# Patient Record
Sex: FEMALE | Race: WHITE | NOT HISPANIC OR LATINO | Employment: OTHER | URBAN - METROPOLITAN AREA
[De-identification: names, ages, dates, MRNs, and addresses within clinical notes are randomized per-mention and may not be internally consistent; named-entity substitution may affect disease eponyms.]

---

## 2017-07-05 ENCOUNTER — OFFICE VISIT (OUTPATIENT)
Dept: INTERNAL MEDICINE | Facility: CLINIC | Age: 82
End: 2017-07-05

## 2017-07-05 VITALS
WEIGHT: 139 LBS | HEIGHT: 63 IN | DIASTOLIC BLOOD PRESSURE: 90 MMHG | SYSTOLIC BLOOD PRESSURE: 134 MMHG | BODY MASS INDEX: 24.63 KG/M2
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
## 2017-07-05 DIAGNOSIS — E78.00 HYPERCHOLESTEROLEMIA: ICD-10-CM

## 2017-07-05 DIAGNOSIS — I10 ESSENTIAL HYPERTENSION: ICD-10-CM

## 2017-07-05 DIAGNOSIS — Z13.820 SCREENING FOR OSTEOPOROSIS: ICD-10-CM

## 2017-07-05 DIAGNOSIS — Z00.00 MEDICARE ANNUAL WELLNESS VISIT, SUBSEQUENT: Primary | ICD-10-CM

## 2017-07-05 LAB
ALBUMIN SERPL-MCNC: 4.3 G/DL (ref 3.5–5.2)
ALBUMIN/GLOB SERPL: 1.6 G/DL
ALP SERPL-CCNC: 67 U/L (ref 39–117)
ALT SERPL-CCNC: 16 U/L (ref 1–33)
AST SERPL-CCNC: 21 U/L (ref 1–32)
BILIRUB SERPL-MCNC: 0.8 MG/DL (ref 0.1–1.2)
BUN SERPL-MCNC: 25 MG/DL (ref 8–23)
BUN/CREAT SERPL: 24.8 (ref 7–25)
CALCIUM SERPL-MCNC: 9.9 MG/DL (ref 8.6–10.5)
CHLORIDE SERPL-SCNC: 102 MMOL/L (ref 98–107)
CHOLEST SERPL-MCNC: 181 MG/DL (ref 0–200)
CO2 SERPL-SCNC: 25.3 MMOL/L (ref 22–29)
CREAT SERPL-MCNC: 1.01 MG/DL (ref 0.57–1)
GLOBULIN SER CALC-MCNC: 2.7 GM/DL
GLUCOSE SERPL-MCNC: 99 MG/DL (ref 65–99)
HDLC SERPL-MCNC: 69 MG/DL (ref 40–60)
LDLC SERPL CALC-MCNC: 93 MG/DL (ref 0–100)
POTASSIUM SERPL-SCNC: 4.5 MMOL/L (ref 3.5–5.2)
PROT SERPL-MCNC: 7 G/DL (ref 6–8.5)
SODIUM SERPL-SCNC: 141 MMOL/L (ref 136–145)
TRIGL SERPL-MCNC: 95 MG/DL (ref 0–150)
VLDLC SERPL-MCNC: 19 MG/DL (ref 5–40)

## 2017-07-05 PROCEDURE — G0439 PPPS, SUBSEQ VISIT: HCPCS | Performed by: INTERNAL MEDICINE

## 2017-07-05 PROCEDURE — 36415 COLL VENOUS BLD VENIPUNCTURE: CPT | Performed by: INTERNAL MEDICINE

## 2017-07-05 PROCEDURE — 99213 OFFICE O/P EST LOW 20 MIN: CPT | Performed by: INTERNAL MEDICINE

## 2017-07-05 RX ORDER — HYDROCHLOROTHIAZIDE 25 MG/1
25 TABLET ORAL DAILY
Qty: 90 TABLET | Refills: 1 | Status: SHIPPED | OUTPATIENT
Start: 2017-07-05 | End: 2019-02-08 | Stop reason: SDUPTHER

## 2017-07-05 RX ORDER — DOXYCYCLINE HYCLATE 100 MG/1
100 CAPSULE ORAL 2 TIMES DAILY
Qty: 14 CAPSULE | Refills: 0 | Status: SHIPPED | OUTPATIENT
Start: 2017-07-05 | End: 2017-07-12

## 2017-07-05 NOTE — PROGRESS NOTES
Chief Complaint   Patient presents with   • Annual Exam     wellness exam   • Insect Bite     left forearm red swollen in the area, itches and stings   • Hypertension   • Hyperlipidemia        Subjective   Eliane Rao is a 81 y.o. female     HPI: Hypertension: This is a chronic problem.   No management changes were made at her last appointment.       She has not had problems with headaches, visual disturbance, dizziness.  Recent blood pressures have been controlled.    She has not had associated symptoms of chest pain, syncope, edema, orthopnea, SALAS, PND.     Current treatment: Diuretic, beta blocker.  Prudent diet and regular exercise have also been recommended. By report, there is good compliance and good tolerance of medication.     Hyperlipidemia:  This is a chronic problem.   No management changes were made at her last appointment.   Her most recent lipid panel was done on 12/8/2016.  Total cholesterol was 236, Triglycerides 104, HDL 55, .   Current treatment: Statin.   Prudent diet and regular exercise have also been recommended. By report, there is good compliance with treatment and good tolerance.    She has not experienced statin associated myalgias, dyspepsia.  She has not had liver enzyme elevation.     She was bitten by an insect on Sunday or Monday.  She is experiencing a stinging itch in the area.  Cortisone cream helps temporarily.              The following portions of the patient's history were reviewed and updated as appropriate: allergies, current medications, past social history, problem list, past surgical history    Review of Systems   Constitutional: Negative for activity change and appetite change.   HENT: Negative for nosebleeds.    Eyes: Negative for visual disturbance.   Respiratory: Negative for cough and shortness of breath.    Cardiovascular: Negative for chest pain, palpitations and leg swelling.   Neurological: Negative for headaches.   Psychiatric/Behavioral: Negative  "for sleep disturbance.           Objective     /90  Ht 63\" (160 cm)  Wt 139 lb (63 kg)  BMI 24.62 kg/m2     Physical Exam   Constitutional: She is oriented to person, place, and time. She appears well-developed and well-nourished. No distress.   Neck: Normal carotid pulses present. Carotid bruit is not present.   Cardiovascular: Normal rate, regular rhythm, S1 normal, S2 normal and intact distal pulses.  Exam reveals no gallop and no friction rub.    No murmur heard.  Pulses:       Carotid pulses are 2+ on the right side, and 2+ on the left side.  Pulmonary/Chest: Effort normal and breath sounds normal. No respiratory distress. She has no wheezes. She has no rhonchi. She has no rales. Chest wall is not dull to percussion.   Musculoskeletal: She exhibits no edema.   Neurological: She is alert and oriented to person, place, and time.   Skin: Skin is warm and dry.   Left forearm: Area of erythema present.   Nursing note and vitals reviewed.      Assessment/Plan   Problem List Items Addressed This Visit        Cardiovascular and Mediastinum    Hypercholesterolemia    Relevant Orders    Lipid Panel    Hypertension    Relevant Medications    hydrochlorothiazide (HYDRODIURIL) 25 MG tablet    Other Relevant Orders    Comprehensive Metabolic Panel    Lipid Panel    Comprehensive Metabolic Panel      Other Visit Diagnoses     Medicare annual wellness visit, subsequent        Screening for osteoporosis        Relevant Orders    DEXA Bone Density Axial    Insect bite, initial encounter        Relevant Medications    doxycycline (VIBRAMYCIN) 100 MG capsule            "

## 2017-07-05 NOTE — PROGRESS NOTES
QUICK REFERENCE INFORMATION:  The ABCs of the Annual Wellness Visit    Subsequent Medicare Wellness Visit    HEALTH RISK ASSESSMENT    1935    Recent Hospitalizations:  No hospitalization(s) within the last year..        Current Medical Providers:  Patient Care Team:  Ara Mcgrath MD as PCP - General  Ara Mcgrath MD as PCP - Family Medicine  Ara Mcgrath MD as PCP - Claims Attributed        Smoking Status:  History   Smoking Status   • Former Smoker   • Quit date: 1990   Smokeless Tobacco   • Not on file       Alcohol Consumption:  History   Alcohol Use   • Yes     Comment: Occasional       Depression Screen:   PHQ-9 Depression Screening 7/5/2017   Little interest or pleasure in doing things 0   Feeling down, depressed, or hopeless 1   Trouble falling or staying asleep, or sleeping too much 0   Feeling tired or having little energy 0   Poor appetite or overeating 0   Feeling bad about yourself - or that you are a failure or have let yourself or your family down 0   Trouble concentrating on things, such as reading the newspaper or watching television 0   Moving or speaking so slowly that other people could have noticed. Or the opposite - being so fidgety or restless that you have been moving around a lot more than usual 0   Thoughts that you would be better off dead, or of hurting yourself in some way 0   PHQ-9 Total Score 1       Health Habits and Functional and Cognitive Screening:  Functional & Cognitive Status 7/5/2017   Do you have difficulty preparing food and eating? No   Do you have difficulty bathing yourself? No   Do you have difficulty getting dressed? No   Do you have difficulty using the toilet? No   Do you have difficulty moving around from place to place? No   In the past year have you fallen or experienced a near fall? No   Do you need help using the phone?  No   Are you deaf or do you have serious difficulty hearing?  No   Do you need help with transportation? No   Do you need help shopping?  No   Do you need help preparing meals?  No   Do you need help with housework?  No   Do you need help with laundry? No   Do you need help taking your medications? No   Do you need help managing money? No   Do you have difficulty concentrating, remembering or making decisions? No       Health Habits  Current Diet: Well Balanced Diet  Dental Exam: Not up to date  Eye Exam: Up to date  Exercise (times per week): 1 times per week  Current Exercise Activities Include: Walking      Does the patient have evidence of cognitive impairment? No    Aspirin use counseling: Taking ASA appropriately as indicated      Recent Lab Results:  CMP:  Lab Results   Component Value Date    BUN 21 12/08/2016    CREATININE 1.11 (H) 12/08/2016    EGFRIFNONA 47 (L) 12/08/2016    BCR 18.9 12/08/2016     12/08/2016    K 4.5 12/08/2016    CO2 32.0 12/08/2016    CALCIUM 8.7 12/08/2016    ALBUMIN 3.40 12/08/2016    LABIL2 1.0 12/08/2016    BILITOT 0.6 12/08/2016    ALKPHOS 84 12/08/2016    AST 18 12/08/2016    ALT 31 12/08/2016     Lipid Panel:  Lab Results   Component Value Date    CHOL 236 (H) 12/08/2016    TRIG 104 12/08/2016    HDL 55 12/08/2016    VLDL 20.8 12/08/2016    LDLCALC 160 (H) 12/08/2016    LDLHDL 2.91 12/08/2016     HbA1c:       Visual Acuity:  No exam data present    Age-appropriate Screening Schedule:  Refer to the list below for future screening recommendations based on patient's age, sex and/or medical conditions. Orders for these recommended tests are listed in the plan section. The patient has been provided with a written plan.    Health Maintenance   Topic Date Due   • TDAP/TD VACCINES (1 - Tdap) 08[birth year]10/1954   • MAMMOGRAM  07/06/2016   • DXA SCAN  11/30/2016   • INFLUENZA VACCINE  08/01/2017   • LIPID PANEL  12/08/2017   • PNEUMOCOCCAL VACCINES (65+ LOW/MEDIUM RISK)  Completed   • ZOSTER VACCINE  Completed        Subjective   History of Present Illness    Eliane Rao is a 81 y.o. female who presents for an  "Subsequent Wellness Visit.    The following portions of the patient's history were reviewed and updated as appropriate: allergies, current medications, past family history, past medical history, past social history, past surgical history and problem list.    Outpatient Medications Prior to Visit   Medication Sig Dispense Refill   • aspirin 81 MG tablet Take 81 mg by mouth daily.     • budesonide-formoterol (SYMBICORT) 160-4.5 MCG/ACT inhaler Inhale 2 puffs 2 (two) times a day as needed.     • Calcium Carb-Cholecalciferol (CALTRATE 600+D) 600-800 MG-UNIT tablet Take  by mouth.     • hydrochlorothiazide (HYDRODIURIL) 25 MG tablet Take 25 mg by mouth daily.     • lovastatin (MEVACOR) 40 MG tablet take 1 tablet by mouth once daily 90 tablet 4   • metoprolol tartrate (LOPRESSOR) 100 MG tablet take 1 tablet by mouth twice a day 180 tablet 3   • montelukast (SINGULAIR) 10 MG tablet Take 10 mg by mouth every night.     • Probiotic Product (PROBIOTIC DAILY PO) Take 1 capsule by mouth daily.       No facility-administered medications prior to visit.        Patient Active Problem List   Diagnosis   • Hypercholesterolemia   • Hypertension   • Chronic renal insufficiency   • IBS (irritable bowel syndrome)   • Patent foramen ovale       Advance Care Planning:  has an advance directive - a copy HAS NOT been provided    Identification of Risk Factors:  Risk factors include: weight  and She is overweight.  Otherwise no risk factors..    Review of Systems    Compared to one year ago, the patient feels her physical health is the same.  Compared to one year ago, the patient feels her mental health is the same.    Objective     Physical Exam    Vitals:    07/05/17 1015   BP: 134/90   Weight: 139 lb (63 kg)   Height: 63\" (160 cm)       Body mass index is 24.62 kg/(m^2).  Discussed the patient's BMI with her. The BMI is in the acceptable range.    Assessment/Plan   Patient Self-Management and Personalized Health Advice  The patient has " been provided with information about: diet, exercise and weight management and preventive services including:   · Bone densitometry screening, Exercise counseling provided, Fall Risk assessment done, Glaucoma screening recommended, She sees her gynecologist for Pap smears and mammograms.  She will check to make sure she is up-to-date on those.  She will be scheduled for bone density test.  She had colonoscopy in 2013.  She will be due for her next one in 2018.  She is up-to-date on immunizations..    Visit Diagnoses:  No diagnosis found.    No orders of the defined types were placed in this encounter.      Outpatient Encounter Prescriptions as of 7/5/2017   Medication Sig Dispense Refill   • aspirin 81 MG tablet Take 81 mg by mouth daily.     • budesonide-formoterol (SYMBICORT) 160-4.5 MCG/ACT inhaler Inhale 2 puffs 2 (two) times a day as needed.     • Calcium Carb-Cholecalciferol (CALTRATE 600+D) 600-800 MG-UNIT tablet Take  by mouth.     • hydrochlorothiazide (HYDRODIURIL) 25 MG tablet Take 25 mg by mouth daily.     • lovastatin (MEVACOR) 40 MG tablet take 1 tablet by mouth once daily 90 tablet 4   • metoprolol tartrate (LOPRESSOR) 100 MG tablet take 1 tablet by mouth twice a day 180 tablet 3   • montelukast (SINGULAIR) 10 MG tablet Take 10 mg by mouth every night.     • Probiotic Product (PROBIOTIC DAILY PO) Take 1 capsule by mouth daily.       No facility-administered encounter medications on file as of 7/5/2017.        Reviewed use of high risk medication in the elderly: not applicable  Reviewed for potential of harmful drug interactions in the elderly: not applicable    Follow Up:  No Follow-up on file.     An After Visit Summary and PPPS with all of these plans were given to the patient.

## 2017-07-05 NOTE — PATIENT INSTRUCTIONS
Medicare Wellness  Personal Prevention Plan of Service     Date of Office Visit:  2017  Encounter Provider:  Ara Mcgrath MD  Place of Service:  [facility]  Patient Name: Eliane Rao  :  1935    As part of the Medicare Wellness portion of your visit today, we are providing you with this personalized preventive plan of services (PPPS). This plan is based upon recommendations of the [place] Preventive Services Task Force (USPSTF) and the Advisory Committee on Immunization Practices (ACIP).    This lists the preventive care services that should be considered, and provides dates of when you are due. Items listed as completed are up-to-date and do not require any further intervention.    Health Maintenance   Topic Date Due   • TDAP/TD VACCINES (1 - Tdap) 08/10/1955   • MEDICARE ANNUAL WELLNESS  2016   • MAMMOGRAM  2016   • DXA SCAN  2016   • INFLUENZA VACCINE  2017   • LIPID PANEL  2017   • PNEUMOCOCCAL VACCINES (65+ LOW/MEDIUM RISK)  Completed   • ZOSTER VACCINE  Completed       Orders Placed This Encounter   Procedures   • DEXA Bone Density Axial     Standing Status:   Future     Order Specific Question:   Reason for Exam:     Answer:   screening for osteoporosis   • Comprehensive Metabolic Panel     Standing Status:   Future     Number of Occurrences:   1   • Lipid Panel     Standing Status:   Future     Number of Occurrences:   1     Standing Expiration Date:   2018   • Comprehensive Metabolic Panel     Standing Status:   Future     Number of Occurrences:   1     Standing Expiration Date:   2018   • Lipid Panel     Standing Status:   Future     Number of Occurrences:   1     Standing Expiration Date:   2018       Return in about 6 months (around 2018) for Recheck, Fasting.

## 2017-08-16 ENCOUNTER — CLINICAL SUPPORT (OUTPATIENT)
Dept: INTERNAL MEDICINE | Facility: CLINIC | Age: 82
End: 2017-08-16

## 2017-08-16 DIAGNOSIS — Z13.820 SCREENING FOR OSTEOPOROSIS: ICD-10-CM

## 2017-08-16 PROCEDURE — 77080 DXA BONE DENSITY AXIAL: CPT | Performed by: INTERNAL MEDICINE

## 2017-08-21 ENCOUNTER — OFFICE VISIT (OUTPATIENT)
Dept: INTERNAL MEDICINE | Facility: CLINIC | Age: 82
End: 2017-08-21

## 2017-08-21 VITALS
BODY MASS INDEX: 24.45 KG/M2 | DIASTOLIC BLOOD PRESSURE: 82 MMHG | SYSTOLIC BLOOD PRESSURE: 134 MMHG | HEIGHT: 63 IN | WEIGHT: 138 LBS | TEMPERATURE: 98.2 F

## 2017-08-21 DIAGNOSIS — R82.90 ABNORMAL URINE FINDINGS: ICD-10-CM

## 2017-08-21 DIAGNOSIS — R10.30 LOWER ABDOMINAL PAIN: Primary | ICD-10-CM

## 2017-08-21 LAB
ALBUMIN SERPL-MCNC: 3.49 G/DL (ref 3.4–4.6)
ALBUMIN/GLOB SERPL: 0.8 G/DL
ALP SERPL-CCNC: 65 U/L (ref 46–116)
ALT SERPL W P-5'-P-CCNC: 19 U/L (ref 14–59)
ANION GAP SERPL CALCULATED.3IONS-SCNC: 13 MMOL/L
AST SERPL-CCNC: 20 U/L (ref 7–37)
BACTERIA UR QL AUTO: ABNORMAL /HPF
BASOPHILS # BLD AUTO: 0.02 10*3/MM3 (ref 0–0.2)
BASOPHILS NFR BLD AUTO: 0.2 % (ref 0–1.5)
BILIRUB SERPL-MCNC: 2 MG/DL (ref 0.2–1)
BILIRUB UR QL STRIP: NEGATIVE
BUN BLD-MCNC: 23 MG/DL (ref 6–22)
BUN/CREAT SERPL: 20 (ref 7–25)
CALCIUM SPEC-SCNC: 9.6 MG/DL (ref 8.6–10.5)
CHLORIDE SERPL-SCNC: 101 MMOL/L (ref 95–107)
CLARITY UR: CLEAR
CO2 SERPL-SCNC: 26 MMOL/L (ref 23–32)
COLOR UR: YELLOW
CREAT BLD-MCNC: 1.15 MG/DL (ref 0.55–1.02)
EOSINOPHIL # BLD AUTO: 0.26 10*3/MM3 (ref 0–0.7)
EOSINOPHIL # BLD AUTO: 2.7 % (ref 0.3–6.2)
ERYTHROCYTE [DISTWIDTH] IN BLOOD BY AUTOMATED COUNT: 13.5 % (ref 11.7–13)
GFR SERPL CREATININE-BSD FRML MDRD: 45 ML/MIN/1.73
GLOBULIN UR ELPH-MCNC: 4.1 GM/DL
GLUCOSE BLD-MCNC: 106 MG/DL (ref 70–100)
GLUCOSE UR STRIP-MCNC: NEGATIVE MG/DL
HCT VFR BLD AUTO: 42 % (ref 35.6–45.5)
HGB BLD-MCNC: 13.3 G/DL (ref 11.9–15.5)
HGB UR QL STRIP.AUTO: NEGATIVE
HYALINE CASTS UR QL AUTO: ABNORMAL /LPF
IMM GRANULOCYTES # BLD: 0.04 10*3/MM3 (ref 0–0.03)
IMM GRANULOCYTES NFR BLD: 0.4 % (ref 0–0.5)
KETONES UR QL STRIP: ABNORMAL
LEUKOCYTE ESTERASE UR QL STRIP.AUTO: ABNORMAL
LYMPHOCYTES # BLD AUTO: 1.62 10*3/MM3 (ref 0.9–4.8)
LYMPHOCYTES NFR BLD AUTO: 16.8 % (ref 19.6–45.3)
MCH RBC QN AUTO: 31.4 PG (ref 26.9–32)
MCHC RBC AUTO-ENTMCNC: 31.7 G/DL (ref 32.4–36.3)
MCV RBC AUTO: 99.3 FL (ref 80.5–98.2)
MONOCYTES # BLD AUTO: 0.89 10*3/MM3 (ref 0.2–1.2)
MONOCYTES NFR BLD AUTO: 9.2 % (ref 5–12)
NEUTROPHILS # BLD AUTO: 6.81 10*3/MM3 (ref 1.9–8.1)
NEUTROPHILS NFR BLD AUTO: 70.7 % (ref 42.7–76)
NITRITE UR QL STRIP: NEGATIVE
PH UR STRIP.AUTO: 6 [PH] (ref 5–8)
PLATELET # BLD AUTO: 236 10*3/MM3 (ref 140–500)
POTASSIUM BLD-SCNC: 3.9 MMOL/L (ref 3.3–5.3)
PROT SERPL-MCNC: 7.6 G/DL (ref 6.3–8.4)
PROT UR QL STRIP: ABNORMAL
RBC # BLD AUTO: 4.23 10*6/MM3 (ref 3.9–5.2)
RBC # UR: ABNORMAL /HPF
REF LAB TEST METHOD: ABNORMAL
SODIUM BLD-SCNC: 140 MMOL/L (ref 136–145)
SP GR UR STRIP: 1.01 (ref 1–1.03)
SQUAMOUS #/AREA URNS HPF: ABNORMAL /HPF
UROBILINOGEN UR QL STRIP: ABNORMAL
WBC # BLD AUTO: 9.64 10*3/MM3 (ref 4.5–10.7)
WBC UR QL AUTO: ABNORMAL /HPF

## 2017-08-21 PROCEDURE — 80053 COMPREHEN METABOLIC PANEL: CPT | Performed by: NURSE PRACTITIONER

## 2017-08-21 PROCEDURE — 99213 OFFICE O/P EST LOW 20 MIN: CPT | Performed by: NURSE PRACTITIONER

## 2017-08-21 PROCEDURE — 81001 URINALYSIS AUTO W/SCOPE: CPT | Performed by: NURSE PRACTITIONER

## 2017-08-21 NOTE — PROGRESS NOTES
Subjective   Eliane Rao is a 82 y.o. female.     HPI Comments: Her colonoscopy is up to date. She has a history of diverticulosis, no diverticulitis. No recent pushing, pulling or straining.     Abdominal Pain   This is a new problem. The current episode started in the past 7 days. The onset quality is sudden. The problem occurs intermittently. The problem has been gradually worsening. The pain is located in the suprapubic region. The pain is at a severity of 7/10. The pain is moderate. The quality of the pain is aching and dull. Associated symptoms include flatus. Pertinent negatives include no constipation, diarrhea, dysuria, fever, frequency, hematuria, nausea or vomiting. Associated symptoms comments: nocturia (chronic )   Last bowel movement on Saturday. . Nothing aggravates the pain. The pain is relieved by nothing. Treatments tried: pepto bismol  The treatment provided mild relief.        The following portions of the patient's history were reviewed and updated as appropriate: allergies, current medications and problem list.    Review of Systems   Constitutional: Positive for chills (?) and fatigue. Negative for activity change, appetite change and fever.   Respiratory: Negative for cough, shortness of breath and wheezing.    Cardiovascular: Negative for chest pain, palpitations and leg swelling.   Gastrointestinal: Positive for abdominal pain and flatus. Negative for blood in stool, constipation, diarrhea, nausea and vomiting.   Genitourinary: Positive for urgency. Negative for dysuria, frequency and hematuria.   Musculoskeletal: Negative for back pain.       Objective   Physical Exam   Constitutional: She is oriented to person, place, and time. She appears well-developed and well-nourished.   HENT:   Head: Normocephalic.   Nose: Nose normal.   Cardiovascular: Regular rhythm and normal heart sounds.  Exam reveals no S3 and no S4.    No murmur heard.  Pulmonary/Chest: Effort normal and breath sounds  normal. She has no decreased breath sounds. She has no wheezes. She has no rhonchi. She has no rales.   Abdominal: Bowel sounds are normal. There is tenderness (lower abdominal area ).   Musculoskeletal: She exhibits no edema.   Neurological: She is alert and oriented to person, place, and time. Gait normal.   Skin: Skin is warm and dry.   Psychiatric: She has a normal mood and affect.       Assessment/Plan   Eliane was seen today for abdominal pain.    Diagnoses and all orders for this visit:    Lower abdominal pain  Comments:  bland diet and advance as tolerated   Orders:  -     Urinalysis With / Microscopic If Indicated; Future  -     Urinalysis With / Microscopic If Indicated  -     Urinalysis, Microscopic Only; Future  -     Urinalysis, Microscopic Only  -     Comprehensive Metabolic Panel; Future  -     CBC & Differential; Future  -     Comprehensive Metabolic Panel  -     CBC & Differential  -     CBC Auto Differential    Abnormal urine findings  -     Urine Culture; Future  -     Cancel: Urine Culture  -     Urine Culture; Future  -     Cancel: Urine Culture  -     Urine Culture; Future  -     Urine Culture      Will check labs and obtain imaging if abnormal. Urine with trace bacteria. Will obtain urine culture. If worsening of symptoms and after hours patient to go to ER.

## 2017-08-21 NOTE — PATIENT INSTRUCTIONS
Abdominal Pain, Adult  Many things can cause abdominal pain. Usually, abdominal pain is not caused by a disease and will improve without treatment. It can often be observed and treated at home. Your health care provider will do a physical exam and possibly order blood tests and X-rays to help determine the seriousness of your pain. However, in many cases, more time must pass before a clear cause of the pain can be found. Before that point, your health care provider may not know if you need more testing or further treatment.  HOME CARE INSTRUCTIONS  Monitor your abdominal pain for any changes. The following actions may help to alleviate any discomfort you are experiencing:  · Only take over-the-counter or prescription medicines as directed by your health care provider.  · Do not take laxatives unless directed to do so by your health care provider.  · Try a clear liquid diet (broth, tea, or water) as directed by your health care provider. Slowly move to a bland diet as tolerated.  SEEK MEDICAL CARE IF:  · You have unexplained abdominal pain.  · You have abdominal pain associated with nausea or diarrhea.  · You have pain when you urinate or have a bowel movement.  · You experience abdominal pain that wakes you in the night.  · You have abdominal pain that is worsened or improved by eating food.  · You have abdominal pain that is worsened with eating fatty foods.  · You have a fever.  SEEK IMMEDIATE MEDICAL CARE IF:  · Your pain does not go away in the time recommended by your healthcare provider  · You keep throwing up (vomiting).  · Your pain is felt only in portions of the abdomen, such as the right side or the left lower portion of the abdomen.  · You pass bloody or black tarry stools.  MAKE SURE YOU:  · Understand these instructions.  · Will watch your condition.  · Will get help right away if you are not doing well or get worse.     This information is not intended to replace advice given to you by your health  care provider. Make sure you discuss any questions you have with your health care provider.     Document Released: 09/27/2006 Document Revised: 09/07/2016 Document Reviewed: 08/27/2014  [employer] Interactive Patient Education ©2017 [employer]

## 2017-08-23 ENCOUNTER — TELEPHONE (OUTPATIENT)
Dept: INTERNAL MEDICINE | Facility: CLINIC | Age: 82
End: 2017-08-23

## 2017-08-23 LAB
BACTERIA UR CULT: NORMAL
Lab: NO GROWTH

## 2017-08-23 NOTE — TELEPHONE ENCOUNTER
She reports she is feeling much better. She denies any fever/chills,nausea and vomiting. She is following a bland diet. She will follow up as scheduled.

## 2017-08-28 ENCOUNTER — OFFICE VISIT (OUTPATIENT)
Dept: INTERNAL MEDICINE | Facility: CLINIC | Age: 82
End: 2017-08-28

## 2017-08-28 VITALS
BODY MASS INDEX: 24.62 KG/M2 | SYSTOLIC BLOOD PRESSURE: 112 MMHG | DIASTOLIC BLOOD PRESSURE: 80 MMHG | HEART RATE: 46 BPM | OXYGEN SATURATION: 97 % | WEIGHT: 139 LBS

## 2017-08-28 DIAGNOSIS — R10.30 LOWER ABDOMINAL PAIN: Primary | ICD-10-CM

## 2017-08-28 PROCEDURE — 99212 OFFICE O/P EST SF 10 MIN: CPT | Performed by: NURSE PRACTITIONER

## 2017-08-28 NOTE — PATIENT INSTRUCTIONS
High-Fiber Diet  Fiber, also called dietary fiber, is a type of carbohydrate found in fruits, vegetables, whole grains, and beans. A high-fiber diet can have many health benefits. Your health care provider may recommend a high-fiber diet to help:  · Prevent constipation. Fiber can make your bowel movements more regular.  · Lower your cholesterol.  · Relieve hemorrhoids, uncomplicated diverticulosis, or irritable bowel syndrome.  · Prevent overeating as part of a weight-loss plan.  · Prevent heart disease, type 2 diabetes, and certain cancers.  WHAT IS MY PLAN?  The recommended daily intake of fiber includes:  · 38 grams for men under age 50.  · 30 grams for men over age 50.  · 25 grams for women under age 50.  · 21 grams for women over age 50.  You can get the recommended daily intake of dietary fiber by eating a variety of fruits, vegetables, grains, and beans. Your health care provider may also recommend a fiber supplement if it is not possible to get enough fiber through your diet.  WHAT DO I NEED TO KNOW ABOUT A HIGH-FIBER DIET?  · Fiber supplements have not been widely studied for their effectiveness, so it is better to get fiber through food sources.  · Always check the fiber content on the nutrition facts label of any prepackaged food. Look for foods that contain at least 5 grams of fiber per serving.  · Ask your dietitian if you have questions about specific foods that are related to your condition, especially if those foods are not listed in the following section.  · Increase your daily fiber consumption gradually. Increasing your intake of dietary fiber too quickly may cause bloating, cramping, or gas.  · Drink plenty of water. Water helps you to digest fiber.  WHAT FOODS CAN I EAT?  Grains  Whole-grain breads. Multigrain cereal. Oats and oatmeal. Brown rice. Barley. Bulgur wheat. Millet. Bran muffins. Popcorn. Rye wafer crackers.  Vegetables  Sweet potatoes. Spinach. Kale. Artichokes. Cabbage. Broccoli.  Green peas. Carrots. Squash.  Fruits  Berries. Pears. Apples. Oranges. Avocados. Prunes and raisins. Dried figs.  Meats and Other Protein Sources  Navy, kidney, garcía, and soy beans. Split peas. Lentils. Nuts and seeds.  Dairy  Fiber-fortified yogurt.  Beverages  Fiber-fortified soy milk. Fiber-fortified orange juice.  Other  Fiber bars.  The items listed above may not be a complete list of recommended foods or beverages. Contact your dietitian for more options.  WHAT FOODS ARE NOT RECOMMENDED?  Grains  White bread. Pasta made with refined flour. White rice.  Vegetables  Fried potatoes. Canned vegetables. Well-cooked vegetables.   Fruits  Fruit juice. Cooked, strained fruit.  Meats and Other Protein Sources  Fatty cuts of meat. Fried poultry or fried fish.  Dairy  Milk. Yogurt. Cream cheese. Sour cream.  Beverages  Soft drinks.  Other  Cakes and pastries. Butter and oils.  The items listed above may not be a complete list of foods and beverages to avoid. Contact your dietitian for more information.  WHAT ARE SOME TIPS FOR INCLUDING HIGH-FIBER FOODS IN MY DIET?  · Eat a wide variety of high-fiber foods.  · Make sure that half of all grains consumed each day are whole grains.  · Replace breads and cereals made from refined flour or white flour with whole-grain breads and cereals.  · Replace white rice with brown rice, bulgur wheat, or millet.  · Start the day with a breakfast that is high in fiber, such as a cereal that contains at least 5 grams of fiber per serving.  · Use beans in place of meat in soups, salads, or pasta.  · Eat high-fiber snacks, such as berries, raw vegetables, nuts, or popcorn.     This information is not intended to replace advice given to you by your health care provider. Make sure you discuss any questions you have with your health care provider.     Document Released: 12/18/2006 Document Revised: 04/10/2017 Document Reviewed: 06/02/2015  [employer] Interactive Patient Education ©2017 [employer]

## 2017-08-28 NOTE — PROGRESS NOTES
Subjective   Eliane Rao is a 82 y.o. female.     HPI Comments: Her colonoscopy is up to date. She has a history of diverticulosis, no diverticulitis. No recent pushing, pulling or straining.     Abdominal Pain   This is a new problem. The current episode started in the past 7 days. The onset quality is sudden. The problem occurs intermittently. The problem has been resolved. The pain is located in the suprapubic region. The pain is at a severity of 0/10. The patient is experiencing no pain. The quality of the pain is aching and dull. The abdominal pain does not radiate. Associated symptoms include flatus (slighlty better ). Pertinent negatives include no constipation, diarrhea, dysuria, fever, frequency, hematuria, melena, nausea or vomiting. Associated symptoms comments: nocturia (chronic )   Last bowel movement on Saturday. . Nothing aggravates the pain. The pain is relieved by nothing. Treatments tried: bland diet and few pepto bismol         The following portions of the patient's history were reviewed and updated as appropriate: allergies, current medications, past family history, past medical history, past social history, past surgical history and problem list.    Review of Systems   Constitutional: Positive for fatigue (doing better ). Negative for activity change, appetite change, chills (resolved ) and fever.   Respiratory: Negative for cough, shortness of breath and wheezing.    Cardiovascular: Negative for chest pain, palpitations and leg swelling.   Gastrointestinal: Positive for abdominal pain (doing much better ) and flatus (slighlty better ). Negative for blood in stool, constipation, diarrhea, melena, nausea and vomiting.   Genitourinary: Negative for dysuria, frequency, hematuria and urgency.   Musculoskeletal: Negative for back pain.       Objective   Physical Exam   Constitutional: She is oriented to person, place, and time. She appears well-developed and well-nourished.   HENT:   Head:  Normocephalic.   Nose: Nose normal.   Cardiovascular: Regular rhythm and normal heart sounds.  Exam reveals no S3 and no S4.    No murmur heard.  Pulmonary/Chest: Effort normal and breath sounds normal. She has no decreased breath sounds. She has no wheezes. She has no rhonchi. She has no rales.   Abdominal: Normal appearance and bowel sounds are normal. There is no tenderness.   Musculoskeletal: She exhibits no edema.   Neurological: She is alert and oriented to person, place, and time. Gait normal.   Skin: Skin is warm and dry.   Psychiatric: She has a normal mood and affect.       Assessment/Plan   Eliane was seen today for abdominal pain.    Diagnoses and all orders for this visit:    Lower abdominal pain  Comments:  doing much better; continue with bland diet     She will return if any worsening of symptoms. I reviewed recent lab work with patient.

## 2017-11-14 ENCOUNTER — OFFICE VISIT (OUTPATIENT)
Dept: INTERNAL MEDICINE | Facility: CLINIC | Age: 82
End: 2017-11-14

## 2017-11-14 VITALS
WEIGHT: 138 LBS | SYSTOLIC BLOOD PRESSURE: 134 MMHG | HEIGHT: 63 IN | DIASTOLIC BLOOD PRESSURE: 84 MMHG | BODY MASS INDEX: 24.45 KG/M2 | RESPIRATION RATE: 16 BRPM | TEMPERATURE: 98.7 F

## 2017-11-14 DIAGNOSIS — R19.7 DIARRHEA, UNSPECIFIED TYPE: Primary | ICD-10-CM

## 2017-11-14 DIAGNOSIS — R10.9 ABDOMINAL CRAMPING: ICD-10-CM

## 2017-11-14 LAB
ALBUMIN SERPL-MCNC: 4.1 G/DL (ref 3.5–5.2)
ALBUMIN/GLOB SERPL: 1.3 G/DL
ALP SERPL-CCNC: 64 U/L (ref 39–117)
ALT SERPL W P-5'-P-CCNC: 14 U/L (ref 1–33)
ANION GAP SERPL CALCULATED.3IONS-SCNC: 12.4 MMOL/L
AST SERPL-CCNC: 22 U/L (ref 1–32)
BACTERIA UR QL AUTO: ABNORMAL /HPF
BILIRUB SERPL-MCNC: 0.5 MG/DL (ref 0.1–1.2)
BILIRUB UR QL CFM: NEGATIVE
BILIRUB UR QL STRIP: ABNORMAL
BUN BLD-MCNC: 20 MG/DL (ref 8–23)
BUN/CREAT SERPL: 16.5 (ref 7–25)
CALCIUM SPEC-SCNC: 10 MG/DL (ref 8.6–10.5)
CHLORIDE SERPL-SCNC: 103 MMOL/L (ref 98–107)
CLARITY UR: CLEAR
CO2 SERPL-SCNC: 26.6 MMOL/L (ref 22–29)
COD CRY URNS QL: ABNORMAL /HPF
COLOR UR: YELLOW
CREAT BLD-MCNC: 1.21 MG/DL (ref 0.57–1)
ERYTHROCYTE [DISTWIDTH] IN BLOOD BY AUTOMATED COUNT: 12.9 % (ref 11.7–13)
GFR SERPL CREATININE-BSD FRML MDRD: 43 ML/MIN/1.73
GLOBULIN UR ELPH-MCNC: 3.2 GM/DL
GLUCOSE BLD-MCNC: 146 MG/DL (ref 65–99)
GLUCOSE UR STRIP-MCNC: NEGATIVE MG/DL
HCT VFR BLD AUTO: 40.4 % (ref 35.6–45.5)
HGB BLD-MCNC: 12.7 G/DL (ref 11.9–15.5)
HGB UR QL STRIP.AUTO: NEGATIVE
HYALINE CASTS UR QL AUTO: ABNORMAL /LPF
KETONES UR QL STRIP: ABNORMAL
LEUKOCYTE ESTERASE UR QL STRIP.AUTO: NEGATIVE
MCH RBC QN AUTO: 31.1 PG (ref 26.9–32)
MCHC RBC AUTO-ENTMCNC: 31.4 G/DL (ref 32.4–36.3)
MCV RBC AUTO: 99 FL (ref 80.5–98.2)
MUCOUS THREADS URNS QL MICRO: ABNORMAL /HPF
NITRITE UR QL STRIP: NEGATIVE
PH UR STRIP.AUTO: 5.5 [PH] (ref 5–8)
PLATELET # BLD AUTO: 312 10*3/MM3 (ref 140–500)
POTASSIUM BLD-SCNC: 3.7 MMOL/L (ref 3.5–5.2)
PROT SERPL-MCNC: 7.3 G/DL (ref 6–8.5)
PROT UR QL STRIP: ABNORMAL
RBC # BLD AUTO: 4.08 10*6/MM3 (ref 3.9–5.2)
RBC # UR: ABNORMAL /HPF
REF LAB TEST METHOD: ABNORMAL
SODIUM BLD-SCNC: 142 MMOL/L (ref 136–145)
SP GR UR STRIP: 1.02 (ref 1–1.03)
SQUAMOUS #/AREA URNS HPF: ABNORMAL /HPF
UROBILINOGEN UR QL STRIP: ABNORMAL
WBC # BLD AUTO: 6.51 10*3/MM3 (ref 4.5–10.7)
WBC UR QL AUTO: ABNORMAL /HPF

## 2017-11-14 PROCEDURE — 99213 OFFICE O/P EST LOW 20 MIN: CPT | Performed by: NURSE PRACTITIONER

## 2017-11-14 PROCEDURE — 81001 URINALYSIS AUTO W/SCOPE: CPT | Performed by: NURSE PRACTITIONER

## 2017-11-14 PROCEDURE — 80053 COMPREHEN METABOLIC PANEL: CPT | Performed by: NURSE PRACTITIONER

## 2017-11-14 NOTE — PROGRESS NOTES
Subjective   Eliane Rao is a 82 y.o. female.     HPI Comments: She reports oo recent travel. She has recently visited someone in the hospital (about 2.5 weeks ago-lung cancer). She denies any suspicious food intake. Her last colonoscopy 11/14/2013 . She has history of diverticulosis, never diverticulitis. She report no new medications or foods.     Diarrhea    This is a new problem. The current episode started 1 to 4 weeks ago. The problem occurs 2 to 4 times per day. The stool consistency is described as watery. The patient states that diarrhea awakens her from sleep. Associated symptoms include abdominal pain (lower abdominal cramping, intermittent, relieved with bowel movements ) and increased flatus. Pertinent negatives include no arthralgias, bloating, chills, coughing, fever, headaches, myalgias, sweats, URI, vomiting or weight loss. Exacerbated by: eating  Treatments tried: immodium  The treatment provided moderate relief.        The following portions of the patient's history were reviewed and updated as appropriate: allergies, current medications, past surgical history and problem list.    Review of Systems   Constitutional: Negative for activity change, appetite change, chills, fatigue, fever, unexpected weight change and weight loss.   Respiratory: Negative for cough, shortness of breath and wheezing.    Cardiovascular: Negative for chest pain, palpitations and leg swelling.   Gastrointestinal: Positive for abdominal distention (chronic ), abdominal pain (lower abdominal cramping, intermittent, relieved with bowel movements ), diarrhea and flatus. Negative for bloating, blood in stool, constipation, nausea and vomiting.        Fecal urgency    Genitourinary: Positive for dysuria. Negative for difficulty urinating, frequency, hematuria and urgency.   Musculoskeletal: Negative for arthralgias and myalgias.   Neurological: Negative for headaches.       Objective   Physical Exam   Constitutional: She  is oriented to person, place, and time. She appears well-developed and well-nourished.   HENT:   Head: Normocephalic.   Nose: Nose normal.   Cardiovascular: Regular rhythm and normal heart sounds.  Exam reveals no S3 and no S4.    No murmur heard.  Pulmonary/Chest: Effort normal and breath sounds normal. She has no decreased breath sounds. She has no wheezes. She has no rhonchi. She has no rales.   Abdominal: Normal appearance and bowel sounds are normal. There is no hepatosplenomegaly. There is tenderness.   Musculoskeletal: She exhibits no edema.   Neurological: She is alert and oriented to person, place, and time. Gait normal.   Skin: Skin is warm and dry.   Psychiatric: She has a normal mood and affect.       Assessment/Plan   Eliane was seen today for diarrhea.    Diagnoses and all orders for this visit:    Diarrhea, unspecified type  Comments:  bland diet; restart probiotic   Orders:  -     Clostridium Difficile Toxin - Stool, Per Rectum; Future  -     Fecal Leukocytes - Stool, Per Rectum; Future  -     Ova & Parasite Examination - Stool, Per Rectum; Future  -     Stool Culture - Stool, Per Rectum; Future    Abdominal cramping  -     Comprehensive Metabolic Panel; Future  -     CBC (No Diff); Future  -     Urinalysis With / Microscopic If Indicated - Urine, Clean Catch; Future  -     Comprehensive Metabolic Panel  -     CBC (No Diff)  -     Urinalysis With / Microscopic If Indicated - Urine, Clean Catch    Will check stools, may need to obtain imaging of abdomen and/or refer to Dr Goldberg.

## 2017-11-14 NOTE — PATIENT INSTRUCTIONS
Diarrhea, Adult  Diarrhea is frequent loose and watery bowel movements. Diarrhea can make you feel weak and cause you to become dehydrated. Dehydration can make you tired and thirsty, cause you to have a dry mouth, and decrease how often you urinate. Diarrhea typically lasts 2-3 days. However, it can last longer if it is a sign of something more serious. It is important to treat your diarrhea as told by your health care provider.  HOME CARE INSTRUCTIONS  Eating and Drinking  Follow these recommendations as told by your health care provider:  · Take an oral rehydration solution (ORS). This is a drink that is sold at pharmacies and retail stores.  · Drink clear fluids, such as water, ice chips, diluted fruit juice, and low-calorie sports drinks.  · Eat bland, easy-to-digest foods in small amounts as you are able. These foods include bananas, applesauce, rice, lean meats, toast, and crackers.  · Avoid drinking fluids that contain a lot of sugar or caffeine, such as energy drinks, sports drinks, and soda.  · Avoid alcohol.  · Avoid spicy or fatty foods.  General Instructions  · Drink enough fluid to keep your urine clear or pale yellow.  · Wash your hands often. If soap and water are not available, use hand .  · Make sure that all people in your household wash their hands well and often.  · Take over-the-counter and prescription medicines only as told by your health care provider.  · Rest at home while you recover.  · Watch your condition for any changes.  · Take a warm bath to relieve any burning or pain from frequent diarrhea episodes.  · Keep all follow-up visits as told by your health care provider. This is important.  SEEK MEDICAL CARE IF:  · You have a fever.  · Your diarrhea gets worse.  · You have new symptoms.  · You cannot keep fluids down.  · You feel light-headed or dizzy.  · You have a headache  · You have muscle cramps.  SEEK IMMEDIATE MEDICAL CARE IF:  · You have chest pain.  · You feel extremely  weak or you faint.  · You have bloody or black stools or stools that look like tar.  · You have severe pain, cramping, or bloating in your abdomen.  · You have trouble breathing or you are breathing very quickly.  · Your heart is beating very quickly.  · Your skin feels cold and clammy.  · You feel confused.  · You have signs of dehydration, such as:    Dark urine, very little urine, or no urine.    Cracked lips.    Dry mouth.    Sunken eyes.    Sleepiness.    Weakness.     This information is not intended to replace advice given to you by your health care provider. Make sure you discuss any questions you have with your health care provider.     Document Released: 12/08/2003 Document Revised: 04/10/2017 Document Reviewed: 08/23/2016  [employer] Patient Education ©2017 [employer]

## 2017-11-15 ENCOUNTER — LAB (OUTPATIENT)
Dept: INTERNAL MEDICINE | Facility: CLINIC | Age: 82
End: 2017-11-15

## 2017-11-15 DIAGNOSIS — R19.7 DIARRHEA, UNSPECIFIED TYPE: ICD-10-CM

## 2017-11-15 LAB — C DIFF TOX GENS STL QL NAA+PROBE: NEGATIVE

## 2017-11-15 PROCEDURE — 87899 AGENT NOS ASSAY W/OPTIC: CPT | Performed by: NURSE PRACTITIONER

## 2017-11-15 PROCEDURE — 87046 STOOL CULTR AEROBIC BACT EA: CPT | Performed by: NURSE PRACTITIONER

## 2017-11-15 PROCEDURE — 87493 C DIFF AMPLIFIED PROBE: CPT | Performed by: NURSE PRACTITIONER

## 2017-11-15 PROCEDURE — 87045 FECES CULTURE AEROBIC BACT: CPT | Performed by: NURSE PRACTITIONER

## 2017-11-17 LAB
BACTERIA SPEC AEROBE CULT: NORMAL
BACTERIA SPEC AEROBE CULT: NORMAL
Lab: NORMAL
O+P SPEC MICRO: NORMAL
OVA + PARASITE RESULT 1: NORMAL
WBC STL QL MICRO: NORMAL

## 2017-11-28 ENCOUNTER — OFFICE VISIT (OUTPATIENT)
Dept: INTERNAL MEDICINE | Facility: CLINIC | Age: 82
End: 2017-11-28

## 2017-11-28 VITALS
DIASTOLIC BLOOD PRESSURE: 78 MMHG | WEIGHT: 139.2 LBS | HEIGHT: 63 IN | BODY MASS INDEX: 24.66 KG/M2 | SYSTOLIC BLOOD PRESSURE: 120 MMHG

## 2017-11-28 DIAGNOSIS — R19.7 DIARRHEA, UNSPECIFIED TYPE: Primary | ICD-10-CM

## 2017-11-28 DIAGNOSIS — M25.552 LEFT HIP PAIN: ICD-10-CM

## 2017-11-28 PROCEDURE — 99213 OFFICE O/P EST LOW 20 MIN: CPT | Performed by: NURSE PRACTITIONER

## 2017-11-28 NOTE — PROGRESS NOTES
Subjective   Eliane Rao is a 82 y.o. female.     HPI Comments: She is here for follow up of diarrhea and abdominal pain. Her stool studies were normal and labs essentially at baseline. She reports so decline in diarrhea, stools are now more formed. In addition, abdominal pain has resolved.     Diarrhea    This is a recurrent problem. The current episode started 1 to 4 weeks ago. The problem occurs less than 2 times per day. The problem has been gradually improving. The patient states that diarrhea does not awaken her from sleep. Associated symptoms include increased flatus. Pertinent negatives include no abdominal pain, arthralgias, bloating, chills, coughing, fever, vomiting or weight loss. She has tried change of diet (probiotic ) for the symptoms. The treatment provided moderate relief.   Hip Pain    The incident occurred more than 1 week ago. There was no injury mechanism. The pain is present in the left hip. The quality of the pain is described as aching. The pain is at a severity of 6/10. The pain is moderate. The pain has been intermittent since onset. Pertinent negatives include no inability to bear weight, loss of motion, loss of sensation, muscle weakness, numbness or tingling. The symptoms are aggravated by movement (walking and going up stairs). She has tried NSAIDs for the symptoms. The treatment provided moderate relief.        The following portions of the patient's history were reviewed and updated as appropriate: allergies, current medications and problem list.    Review of Systems   Constitutional: Negative for activity change, appetite change, chills, fatigue, fever, unexpected weight change and weight loss.   Respiratory: Negative for cough, shortness of breath and wheezing.    Cardiovascular: Negative for chest pain, palpitations and leg swelling.   Gastrointestinal: Positive for diarrhea and flatus. Negative for abdominal pain, bloating, blood in stool, nausea and vomiting.    Musculoskeletal: Negative for arthralgias.   Neurological: Negative for tingling and numbness.       Objective   Physical Exam   Constitutional: She is oriented to person, place, and time. She appears well-developed and well-nourished.   HENT:   Head: Normocephalic.   Nose: Nose normal.   Cardiovascular: Regular rhythm and normal heart sounds.  Exam reveals no S3 and no S4.    No murmur heard.  Pulmonary/Chest: Effort normal and breath sounds normal. She has no decreased breath sounds. She has no wheezes. She has no rhonchi. She has no rales.   Musculoskeletal: She exhibits no edema.        Right hip: She exhibits normal range of motion and normal strength.        Left hip: She exhibits tenderness. She exhibits normal range of motion, normal strength and no swelling.   (-) SLR    Neurological: She is alert and oriented to person, place, and time. Gait normal.   Skin: Skin is warm and dry.   Psychiatric: She has a normal mood and affect.       Assessment/Plan   Eliane was seen today for diarrhea.    Diagnoses and all orders for this visit:    Diarrhea, unspecified type  Comments:  doing better     Left hip pain  Comments:  use tylenol; would hold on PT     She will continue with probiotic. If symptoms persist she will schedule appt with Dr Goldberg. I did review most recent lab work and also bone density performed earlier this year per patient request.   She would like to hold on workup of left hip.

## 2017-11-29 ENCOUNTER — TELEPHONE (OUTPATIENT)
Dept: INTERNAL MEDICINE | Facility: CLINIC | Age: 82
End: 2017-11-29

## 2017-11-29 NOTE — TELEPHONE ENCOUNTER
Discussed with her. She is having some left hip discomfort.  Advised her this was more likely to be OA.  She will monitor for now.

## 2017-11-29 NOTE — TELEPHONE ENCOUNTER
Patient was in yesterday 11/29/2017 to see Lauren for a 2 week follow up and is requesting you contact her to discuss her Dexa Scan results.  I gave her a copy of her results to take with her.  Please advise.    Patient #  [phone]

## 2018-01-05 ENCOUNTER — OFFICE VISIT (OUTPATIENT)
Dept: INTERNAL MEDICINE | Facility: CLINIC | Age: 83
End: 2018-01-05

## 2018-01-05 VITALS
HEART RATE: 58 BPM | HEIGHT: 63 IN | SYSTOLIC BLOOD PRESSURE: 140 MMHG | DIASTOLIC BLOOD PRESSURE: 90 MMHG | WEIGHT: 138 LBS | BODY MASS INDEX: 24.45 KG/M2 | OXYGEN SATURATION: 97 %

## 2018-01-05 DIAGNOSIS — J20.9 ACUTE BRONCHITIS, UNSPECIFIED ORGANISM: ICD-10-CM

## 2018-01-05 DIAGNOSIS — E78.5 HYPERLIPIDEMIA, UNSPECIFIED HYPERLIPIDEMIA TYPE: Primary | ICD-10-CM

## 2018-01-05 PROCEDURE — 99214 OFFICE O/P EST MOD 30 MIN: CPT | Performed by: INTERNAL MEDICINE

## 2018-01-05 RX ORDER — BENZONATATE 200 MG/1
200 CAPSULE ORAL 3 TIMES DAILY PRN
Qty: 30 CAPSULE | Refills: 0 | Status: SHIPPED | OUTPATIENT
Start: 2018-01-05 | End: 2018-09-13

## 2018-01-05 RX ORDER — LOVASTATIN 40 MG/1
40 TABLET ORAL NIGHTLY
Qty: 90 TABLET | Refills: 3 | Status: SHIPPED | OUTPATIENT
Start: 2018-01-05 | End: 2018-12-04

## 2018-01-05 RX ORDER — AZITHROMYCIN 250 MG/1
TABLET, FILM COATED ORAL
Qty: 6 TABLET | Refills: 0 | Status: SHIPPED | OUTPATIENT
Start: 2018-01-05 | End: 2018-09-13

## 2018-01-05 NOTE — PROGRESS NOTES
Subjective   Eliane Rao is a 82 y.o. female.     URI    This is a new problem. The current episode started in the past 7 days. Associated symptoms include coughing (Yellow sputum) and rhinorrhea.        The following portions of the patient's history were reviewed and updated as appropriate: allergies, current medications, past family history, past medical history, past social history, past surgical history and problem list.    Review of Systems   Constitutional: Positive for chills and fatigue (No energy at all). Negative for fever.   HENT: Positive for postnasal drip and rhinorrhea.    Respiratory: Positive for cough (Yellow sputum) and chest tightness (Chest hurts from coughing).    Gastrointestinal: Negative.    Genitourinary: Negative.        Objective   Physical Exam   Constitutional: She is oriented to person, place, and time. She appears well-developed.   HENT:   Head: Normocephalic.   Right Ear: External ear normal.   Left Ear: External ear normal.   Mouth/Throat: Oropharynx is clear and moist.   Eyes: EOM are normal.   Neck: Neck supple.   Cardiovascular: Normal rate, regular rhythm and normal heart sounds.    Pulmonary/Chest: Effort normal. She has wheezes ( On coughing). She has rales ( Scattered rhonchi).   Musculoskeletal: Normal range of motion.   Neurological: She is alert and oriented to person, place, and time.   Vitals reviewed.      Assessment/Plan   Eliane was seen today for uri.    Diagnoses and all orders for this visit:    Hyperlipidemia, unspecified hyperlipidemia type  -     lovastatin (MEVACOR) 40 MG tablet; Take 1 tablet by mouth Every Night.    Acute bronchitis, unspecified organism  -     azithromycin (ZITHROMAX) 250 MG tablet; Take 2 tablets the first day, then 1 tablet daily for 4 days.  -     benzonatate (TESSALON) 200 MG capsule; Take 1 capsule by mouth 3 (Three) Times a Day As Needed for Cough.

## 2018-01-22 ENCOUNTER — TELEPHONE (OUTPATIENT)
Dept: INTERNAL MEDICINE | Facility: CLINIC | Age: 83
End: 2018-01-22

## 2018-01-22 RX ORDER — SCOLOPAMINE TRANSDERMAL SYSTEM 1 MG/1
1 PATCH, EXTENDED RELEASE TRANSDERMAL
Qty: 4 PATCH | Refills: 0 | Status: SHIPPED | OUTPATIENT
Start: 2018-01-22 | End: 2018-09-13

## 2018-01-22 NOTE — TELEPHONE ENCOUNTER
----- Message from Mayte Rodriguez sent at 1/22/2018  3:42 PM EST -----  Contact: pt - Dr Mcgrath' pt - RE: new Rx  Pt calling and would like a Rx for seasickness to be called to pt's pharmacy. Pt would like the patch.  Could you please call Rx in. Please advise. Thanks      HEIDI GILL[address] [place] [address] RD. - [phone] [facility] [phone] FX    Pt #[phone]

## 2018-02-02 RX ORDER — METOPROLOL TARTRATE 100 MG/1
100 TABLET ORAL 2 TIMES DAILY
Qty: 180 TABLET | Refills: 1 | Status: SHIPPED | OUTPATIENT
Start: 2018-02-02 | End: 2018-08-11 | Stop reason: SDUPTHER

## 2018-03-05 ENCOUNTER — OFFICE VISIT (OUTPATIENT)
Dept: INTERNAL MEDICINE | Facility: CLINIC | Age: 83
End: 2018-03-05

## 2018-03-05 VITALS
DIASTOLIC BLOOD PRESSURE: 80 MMHG | BODY MASS INDEX: 25.4 KG/M2 | SYSTOLIC BLOOD PRESSURE: 140 MMHG | WEIGHT: 138 LBS | HEIGHT: 62 IN

## 2018-03-05 DIAGNOSIS — E78.00 HYPERCHOLESTEROLEMIA: ICD-10-CM

## 2018-03-05 DIAGNOSIS — I10 ESSENTIAL HYPERTENSION: Primary | ICD-10-CM

## 2018-03-05 LAB
ALBUMIN SERPL-MCNC: 4.3 G/DL (ref 3.5–5.2)
ALBUMIN/GLOB SERPL: 1.4 G/DL
ALP SERPL-CCNC: 62 U/L (ref 39–117)
ALT SERPL W P-5'-P-CCNC: 14 U/L (ref 1–33)
ANION GAP SERPL CALCULATED.3IONS-SCNC: 8.5 MMOL/L
AST SERPL-CCNC: 20 U/L (ref 1–32)
BILIRUB SERPL-MCNC: 1 MG/DL (ref 0.1–1.2)
BUN BLD-MCNC: 26 MG/DL (ref 8–23)
BUN/CREAT SERPL: 28.3 (ref 7–25)
CALCIUM SPEC-SCNC: 9.5 MG/DL (ref 8.6–10.5)
CHLORIDE SERPL-SCNC: 103 MMOL/L (ref 98–107)
CHOLEST SERPL-MCNC: 195 MG/DL (ref 0–200)
CO2 SERPL-SCNC: 30.5 MMOL/L (ref 22–29)
CREAT BLD-MCNC: 0.92 MG/DL (ref 0.57–1)
GFR SERPL CREATININE-BSD FRML MDRD: 59 ML/MIN/1.73
GLOBULIN UR ELPH-MCNC: 3 GM/DL
GLUCOSE BLD-MCNC: 79 MG/DL (ref 65–99)
HDLC SERPL-MCNC: 71 MG/DL (ref 40–60)
LDLC SERPL CALC-MCNC: 104 MG/DL (ref 0–100)
LDLC/HDLC SERPL: 1.46 {RATIO}
POTASSIUM BLD-SCNC: 4.3 MMOL/L (ref 3.5–5.2)
PROT SERPL-MCNC: 7.3 G/DL (ref 6–8.5)
SODIUM BLD-SCNC: 142 MMOL/L (ref 136–145)
TRIGL SERPL-MCNC: 101 MG/DL (ref 0–150)
TSH SERPL-ACNC: 1.87 MIU/ML (ref 0.27–4.2)
VLDLC SERPL-MCNC: 20.2 MG/DL (ref 5–40)

## 2018-03-05 PROCEDURE — 80061 LIPID PANEL: CPT | Performed by: INTERNAL MEDICINE

## 2018-03-05 PROCEDURE — 99213 OFFICE O/P EST LOW 20 MIN: CPT | Performed by: INTERNAL MEDICINE

## 2018-03-05 PROCEDURE — 80053 COMPREHEN METABOLIC PANEL: CPT | Performed by: INTERNAL MEDICINE

## 2018-03-05 NOTE — PROGRESS NOTES
"Chief Complaint   Patient presents with   • Hypertension   • Hyperlipidemia        Subjective   Eliane Rao is a 82 y.o. female     Hypertension   This is a chronic problem. The problem is controlled. Pertinent negatives include no chest pain, headaches, palpitations or shortness of breath. There are no associated agents to hypertension. Past treatments include beta blockers and diuretics (This is current treatment). The current treatment provides moderate improvement. There are no compliance problems.  There is no history of CAD/MI or CVA.   Hyperlipidemia   This is a chronic problem. The problem is controlled. Recent lipid tests were reviewed and are normal. Exacerbating diseases include obesity. She has no history of diabetes or hypothyroidism. Factors aggravating her hyperlipidemia include beta blockers and thiazides. Pertinent negatives include no chest pain, leg pain, myalgias or shortness of breath. Current antihyperlipidemic treatment includes statins and diet change. The current treatment provides significant improvement of lipids.   She has noticed some forgetfulness.  She has been forgetting names.    The following portions of the patient's history were reviewed and updated as appropriate: allergies, current medications, past social history, problem list, past surgical history    Review of Systems   Constitutional: Negative for activity change and appetite change.   HENT: Negative for nosebleeds.    Eyes: Negative for visual disturbance.   Respiratory: Negative for cough and shortness of breath.    Cardiovascular: Negative for chest pain, palpitations and leg swelling.   Musculoskeletal: Negative for myalgias.   Neurological: Negative for headaches.   Psychiatric/Behavioral: Negative for sleep disturbance.           Objective     /80  Ht 157.5 cm (62\")  Wt 62.6 kg (138 lb)  BMI 25.24 kg/m2     Physical Exam   Constitutional: She is oriented to person, place, and time. She appears " well-developed and well-nourished. No distress.   Neck: Normal carotid pulses present. Carotid bruit is not present.   Cardiovascular: Normal rate, regular rhythm, S1 normal, S2 normal and intact distal pulses.  Exam reveals no gallop and no friction rub.    No murmur heard.  Pulses:       Carotid pulses are 2+ on the right side, and 2+ on the left side.  Pulmonary/Chest: Effort normal and breath sounds normal. No respiratory distress. She has no wheezes. She has no rhonchi. She has no rales. Chest wall is not dull to percussion.   Musculoskeletal: She exhibits no edema.   Neurological: She is alert and oriented to person, place, and time.   Skin: Skin is warm and dry.   Nursing note and vitals reviewed.    Mini-Mental Status exam score 27.    Assessment/Plan   Problem List Items Addressed This Visit        Cardiovascular and Mediastinum    Hypercholesterolemia    Hypertension - Primary    Relevant Orders    Comprehensive Metabolic Panel    Lipid Panel    TSH Rfx On Abnormal To Free T4      Discussed for forgetfulness.  We'll check thyroid function tests.

## 2018-08-13 RX ORDER — METOPROLOL TARTRATE 100 MG/1
TABLET ORAL
Qty: 180 TABLET | Refills: 0 | Status: SHIPPED | OUTPATIENT
Start: 2018-08-13 | End: 2018-09-28 | Stop reason: SDUPTHER

## 2018-09-13 ENCOUNTER — OFFICE VISIT (OUTPATIENT)
Dept: INTERNAL MEDICINE | Facility: CLINIC | Age: 83
End: 2018-09-13

## 2018-09-13 ENCOUNTER — APPOINTMENT (OUTPATIENT)
Dept: WOMENS IMAGING | Facility: HOSPITAL | Age: 83
End: 2018-09-13

## 2018-09-13 VITALS
BODY MASS INDEX: 24.48 KG/M2 | WEIGHT: 133 LBS | HEIGHT: 62 IN | DIASTOLIC BLOOD PRESSURE: 76 MMHG | SYSTOLIC BLOOD PRESSURE: 134 MMHG

## 2018-09-13 DIAGNOSIS — I10 ESSENTIAL HYPERTENSION: Primary | ICD-10-CM

## 2018-09-13 DIAGNOSIS — M79.605 PAIN IN LEFT LEG: ICD-10-CM

## 2018-09-13 DIAGNOSIS — E78.00 HYPERCHOLESTEROLEMIA: ICD-10-CM

## 2018-09-13 LAB
ALBUMIN SERPL-MCNC: 4 G/DL (ref 3.5–5.2)
ALBUMIN/GLOB SERPL: 1.4 G/DL
ALP SERPL-CCNC: 58 U/L (ref 39–117)
ALT SERPL W P-5'-P-CCNC: 19 U/L (ref 1–33)
ANION GAP SERPL CALCULATED.3IONS-SCNC: 7.9 MMOL/L
AST SERPL-CCNC: 21 U/L (ref 1–32)
BILIRUB SERPL-MCNC: 1.3 MG/DL (ref 0.1–1.2)
BUN BLD-MCNC: 20 MG/DL (ref 8–23)
BUN/CREAT SERPL: 20.8 (ref 7–25)
CALCIUM SPEC-SCNC: 9.5 MG/DL (ref 8.6–10.5)
CHLORIDE SERPL-SCNC: 104 MMOL/L (ref 98–107)
CHOLEST SERPL-MCNC: 186 MG/DL (ref 0–200)
CO2 SERPL-SCNC: 28.1 MMOL/L (ref 22–29)
CREAT BLD-MCNC: 0.96 MG/DL (ref 0.57–1)
GFR SERPL CREATININE-BSD FRML MDRD: 56 ML/MIN/1.73
GLOBULIN UR ELPH-MCNC: 2.8 GM/DL
GLUCOSE BLD-MCNC: 86 MG/DL (ref 65–99)
HDLC SERPL-MCNC: 76 MG/DL (ref 40–60)
LDLC SERPL CALC-MCNC: 98 MG/DL (ref 0–100)
LDLC/HDLC SERPL: 1.29 {RATIO}
POTASSIUM BLD-SCNC: 4.3 MMOL/L (ref 3.5–5.2)
PROT SERPL-MCNC: 6.8 G/DL (ref 6–8.5)
SODIUM BLD-SCNC: 140 MMOL/L (ref 136–145)
TRIGL SERPL-MCNC: 59 MG/DL (ref 0–150)
VLDLC SERPL-MCNC: 11.8 MG/DL (ref 5–40)

## 2018-09-13 PROCEDURE — 36415 COLL VENOUS BLD VENIPUNCTURE: CPT | Performed by: INTERNAL MEDICINE

## 2018-09-13 PROCEDURE — 73590 X-RAY EXAM OF LOWER LEG: CPT | Performed by: INTERNAL MEDICINE

## 2018-09-13 PROCEDURE — 73590 X-RAY EXAM OF LOWER LEG: CPT | Performed by: RADIOLOGY

## 2018-09-13 PROCEDURE — 99213 OFFICE O/P EST LOW 20 MIN: CPT | Performed by: INTERNAL MEDICINE

## 2018-09-13 PROCEDURE — 80061 LIPID PANEL: CPT | Performed by: INTERNAL MEDICINE

## 2018-09-13 PROCEDURE — 80053 COMPREHEN METABOLIC PANEL: CPT | Performed by: INTERNAL MEDICINE

## 2018-09-13 NOTE — PROGRESS NOTES
Chief Complaint   Patient presents with   • Hypertension     6 month follow up   • Hyperlipidemia       Subjective   Eliane Rao is a 83 y.o. female.     Hypertension   This is a chronic problem. The problem is controlled. Pertinent negatives include no blurred vision, chest pain, headaches, orthopnea, palpitations, peripheral edema, PND or shortness of breath. Risk factors for coronary artery disease include dyslipidemia. Current antihypertension treatment includes diuretics, beta blockers and lifestyle changes. The current treatment provides moderate improvement. There are no compliance problems.  There is no history of CAD/MI or CVA.   Hyperlipidemia   This is a chronic problem. Recent lipid tests were reviewed and are variable. She has no history of diabetes. Factors aggravating her hyperlipidemia include beta blockers and thiazides. Pertinent negatives include no chest pain, leg pain, myalgias or shortness of breath. Current antihyperlipidemic treatment includes statins, diet change and exercise. The current treatment provides significant improvement of lipids. There are no compliance problems.       She has been noticing pain on the posterior aspect of her left lower leg.  She doesn't recall any specific precipitating factors.  It does not hurt when she walks.  It just hurts when she touches it.  No leg swelling.  No erythema.  No injury recalled.      The following portions of the patient's history were reviewed and updated as appropriate: allergies, current medications, past family history, past medical history, past social history, past surgical history and problem list.    Review of Systems   Constitutional: Negative for appetite change.   HENT: Negative for nosebleeds.    Eyes: Negative for blurred vision and double vision.   Respiratory: Negative for cough and shortness of breath.    Cardiovascular: Negative for chest pain, palpitations, orthopnea, leg swelling and PND.   Genitourinary: Positive  "for urinary incontinence (sometimes just a little dribbling on her way to the bathroom.).   Musculoskeletal: Negative for myalgias.   Neurological: Negative for dizziness and headache.         Current Outpatient Prescriptions:   •  aspirin 81 MG tablet, Take 81 mg by mouth daily., Disp: , Rfl:   •  Calcium Carb-Cholecalciferol (CALTRATE 600+D) 600-800 MG-UNIT tablet, Take  by mouth., Disp: , Rfl:   •  hydrochlorothiazide (HYDRODIURIL) 25 MG tablet, Take 1 tablet by mouth Daily., Disp: 90 tablet, Rfl: 1  •  lovastatin (MEVACOR) 40 MG tablet, Take 1 tablet by mouth Every Night., Disp: 90 tablet, Rfl: 3  •  metoprolol tartrate (LOPRESSOR) 100 MG tablet, TAKE ONE TABLET BY MOUTH TWICE A DAY, Disp: 180 tablet, Rfl: 0  •  montelukast (SINGULAIR) 10 MG tablet, Take 10 mg by mouth every night., Disp: , Rfl:         Objective     /76   Ht 157.5 cm (62\")   Wt 60.3 kg (133 lb)   BMI 24.33 kg/m²     Physical Exam   Constitutional: She is oriented to person, place, and time. She appears well-developed and well-nourished. No distress.   Neck: Normal carotid pulses present. Carotid bruit is not present.   Cardiovascular: Regular rhythm, S1 normal and S2 normal.  Exam reveals no gallop and no friction rub.    No murmur heard.  Pulses:       Carotid pulses are 2+ on the right side, and 2+ on the left side.  Pulmonary/Chest: Effort normal and breath sounds normal. She has no wheezes. She has no rhonchi. She has no rales. Chest wall is not dull to percussion.   Musculoskeletal: She exhibits no edema.   There is no tenderness of her left lower leg with flexion and extension of her ankle.  There is tenderness to palpation of the posterior musculature just proximal to the Achilles tendon.  The Achilles tendon is nontender to palpation.  No swelling.   Neurological: She is alert and oriented to person, place, and time.   Skin: Skin is warm and dry.   Nursing note and vitals reviewed.        Assessment/Plan   Eliane was seen today " for hypertension and hyperlipidemia.    Diagnoses and all orders for this visit:    Essential hypertension  -     Comprehensive Metabolic Panel; Future  -     Lipid Panel; Future  -     Comprehensive Metabolic Panel  -     Lipid Panel    Hypercholesterolemia    Pain in left leg  -     XR Tibia Fibula 2 View Left (In Office)      If leg x-ray is negative, advised her we could try physical therapy.

## 2018-09-28 RX ORDER — METOPROLOL TARTRATE 100 MG/1
TABLET ORAL
Qty: 180 TABLET | Refills: 1 | Status: SHIPPED | OUTPATIENT
Start: 2018-09-28 | End: 2019-06-04 | Stop reason: SDUPTHER

## 2018-12-04 ENCOUNTER — OFFICE VISIT (OUTPATIENT)
Dept: INTERNAL MEDICINE | Facility: CLINIC | Age: 83
End: 2018-12-04

## 2018-12-04 VITALS
HEIGHT: 62 IN | OXYGEN SATURATION: 97 % | SYSTOLIC BLOOD PRESSURE: 150 MMHG | BODY MASS INDEX: 24.66 KG/M2 | HEART RATE: 65 BPM | WEIGHT: 134 LBS | DIASTOLIC BLOOD PRESSURE: 82 MMHG

## 2018-12-04 DIAGNOSIS — M79.10 MYALGIA: Primary | ICD-10-CM

## 2018-12-04 PROCEDURE — 99213 OFFICE O/P EST LOW 20 MIN: CPT | Performed by: INTERNAL MEDICINE

## 2018-12-04 NOTE — PROGRESS NOTES
"Chief Complaint   Patient presents with   • Arm Pain   • Leg Pain       Subjective   Eliane Rao is a 83 y.o. female.     History of Present Illness     She started noticing arm and leg pain about 3 weeks ago.  She feels stiff in the mornings.  During the day, pain comes and goes. She is sore when she sits down and when she stands back up she feels sore. Outside of her arms feel sore.  Her legs feel sore kind of all over. No rashes, no fevers. 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     No problems with chewing swallowing.  No vision changes.    The following portions of the patient's history were reviewed and updated as appropriate: allergies, current medications, past family history, past medical history, past social history, past surgical history and problem list.    Review of Systems   HENT: Negative for trouble swallowing.    Eyes: Negative for blurred vision and double vision.   Musculoskeletal: Positive for arthralgias and myalgias. Negative for back pain and gait problem.   Neurological: Negative for headache.         Current Outpatient Medications:   •  aspirin 81 MG tablet, Take 81 mg by mouth daily., Disp: , Rfl:   •  Calcium Carb-Cholecalciferol (CALTRATE 600+D) 600-800 MG-UNIT tablet, Take  by mouth., Disp: , Rfl:   •  hydrochlorothiazide (HYDRODIURIL) 25 MG tablet, Take 1 tablet by mouth Daily., Disp: 90 tablet, Rfl: 1  •  Lactobacillus Rhamnosus, GG, (CULTURELLE PO), Take  by mouth Daily., Disp: , Rfl:   •  metoprolol tartrate (LOPRESSOR) 100 MG tablet, TAKE ONE TABLET BY MOUTH TWICE A DAY, Disp: 180 tablet, Rfl: 1  •  montelukast (SINGULAIR) 10 MG tablet, Take 10 mg by mouth every night., Disp: , Rfl:         Objective     /82 (BP Location: Right arm, Patient Position: Sitting, Cuff Size: Adult)   Pulse 65   Ht 157.5 cm (62\")   Wt 60.8 kg (134 lb)   SpO2 97%   BMI 24.51 kg/m²     Physical Exam   Constitutional: She appears well-developed and " well-nourished. No distress.   Cardiovascular: Normal rate, regular rhythm and normal heart sounds. Exam reveals no gallop and no friction rub.   No murmur heard.  Pulmonary/Chest: Effort normal and breath sounds normal. No respiratory distress.   Musculoskeletal: Normal range of motion. She exhibits no edema or tenderness.   Nursing note and vitals reviewed.        Assessment/Plan   Eliane was seen today for arm pain and leg pain.    Diagnoses and all orders for this visit:    Myalgia  Comments:  will stop lovastatin      Discussed myalgias.  At this time, we are going to stop lovastatin on the chance that it is related to that.  If her symptoms do not improve after about 2 weeks, evaluate for rheumatologic condition, possible PMR.

## 2018-12-20 ENCOUNTER — TELEPHONE (OUTPATIENT)
Dept: INTERNAL MEDICINE | Facility: CLINIC | Age: 83
End: 2018-12-20

## 2018-12-20 NOTE — TELEPHONE ENCOUNTER
Pt calling to inform you of symptoms when she stopped her lovastatin, still having muscle aches and pain all over . Pt stated can not see a whole a lot different since she stooped it.    Pt also complaining of diarrhea , been taking imodium OTC and not helping at all. Advised acute clinic. Will come if her sx not improving.

## 2018-12-21 NOTE — TELEPHONE ENCOUNTER
Called, left detailed voice mail to pt and to call us if her diarrhea did not improve and we can refer her to see GI.

## 2018-12-21 NOTE — TELEPHONE ENCOUNTER
She could also try pepto bismol for the diarrhea.  She could also see a gastroenterologist for further evaluation.  (stool cultures were negative)    For the muscle aches, I recommend some additional blood work.

## 2019-01-08 ENCOUNTER — OFFICE (OUTPATIENT)
Dept: URBAN - METROPOLITAN AREA CLINIC 66 | Facility: CLINIC | Age: 84
End: 2019-01-08

## 2019-01-08 VITALS
HEIGHT: 63 IN | SYSTOLIC BLOOD PRESSURE: 143 MMHG | HEART RATE: 56 BPM | WEIGHT: 132 LBS | DIASTOLIC BLOOD PRESSURE: 89 MMHG

## 2019-01-08 DIAGNOSIS — Z86.010 PERSONAL HISTORY OF COLONIC POLYPS: ICD-10-CM

## 2019-01-08 DIAGNOSIS — K52.9 NONINFECTIVE GASTROENTERITIS AND COLITIS, UNSPECIFIED: ICD-10-CM

## 2019-01-08 PROCEDURE — 99203 OFFICE O/P NEW LOW 30 MIN: CPT | Performed by: NURSE PRACTITIONER

## 2019-02-04 ENCOUNTER — OFFICE (OUTPATIENT)
Dept: URBAN - METROPOLITAN AREA PATHOLOGY 4 | Facility: PATHOLOGY | Age: 84
End: 2019-02-04

## 2019-02-04 ENCOUNTER — AMBULATORY SURGICAL CENTER (OUTPATIENT)
Dept: URBAN - METROPOLITAN AREA SURGERY 20 | Facility: SURGERY | Age: 84
End: 2019-02-04

## 2019-02-04 VITALS — HEIGHT: 63 IN

## 2019-02-04 DIAGNOSIS — Z86.010 PERSONAL HISTORY OF COLONIC POLYPS: ICD-10-CM

## 2019-02-04 DIAGNOSIS — K52.9 NONINFECTIVE GASTROENTERITIS AND COLITIS, UNSPECIFIED: ICD-10-CM

## 2019-02-04 DIAGNOSIS — K57.30 DIVERTICULOSIS OF LARGE INTESTINE WITHOUT PERFORATION OR ABS: ICD-10-CM

## 2019-02-04 LAB
GI HISTOLOGY: A. SELECT: (no result)
GI HISTOLOGY: PDF REPORT: (no result)

## 2019-02-04 PROCEDURE — 88305 TISSUE EXAM BY PATHOLOGIST: CPT | Performed by: INTERNAL MEDICINE

## 2019-02-04 PROCEDURE — 45380 COLONOSCOPY AND BIOPSY: CPT | Mod: PT | Performed by: INTERNAL MEDICINE

## 2019-02-08 ENCOUNTER — OFFICE VISIT (OUTPATIENT)
Dept: INTERNAL MEDICINE | Facility: CLINIC | Age: 84
End: 2019-02-08

## 2019-02-08 VITALS
DIASTOLIC BLOOD PRESSURE: 90 MMHG | SYSTOLIC BLOOD PRESSURE: 146 MMHG | HEIGHT: 61 IN | WEIGHT: 128 LBS | BODY MASS INDEX: 24.17 KG/M2

## 2019-02-08 DIAGNOSIS — Z12.31 VISIT FOR SCREENING MAMMOGRAM: ICD-10-CM

## 2019-02-08 DIAGNOSIS — E78.00 HYPERCHOLESTEROLEMIA: ICD-10-CM

## 2019-02-08 DIAGNOSIS — I10 ESSENTIAL HYPERTENSION: ICD-10-CM

## 2019-02-08 DIAGNOSIS — M19.90 ARTHRITIS: ICD-10-CM

## 2019-02-08 DIAGNOSIS — Z00.00 MEDICARE ANNUAL WELLNESS VISIT, SUBSEQUENT: Primary | ICD-10-CM

## 2019-02-08 LAB
ALBUMIN SERPL-MCNC: 4.1 G/DL (ref 3.5–5.2)
ALBUMIN/GLOB SERPL: 1.4 G/DL
ALP SERPL-CCNC: 64 U/L (ref 39–117)
ALT SERPL W P-5'-P-CCNC: 12 U/L (ref 1–33)
ANION GAP SERPL CALCULATED.3IONS-SCNC: 11.5 MMOL/L
AST SERPL-CCNC: 13 U/L (ref 1–32)
BILIRUB SERPL-MCNC: 1 MG/DL (ref 0.1–1.2)
BUN BLD-MCNC: 20 MG/DL (ref 8–23)
BUN/CREAT SERPL: 22.2 (ref 7–25)
CALCIUM SPEC-SCNC: 9.7 MG/DL (ref 8.6–10.5)
CHLORIDE SERPL-SCNC: 104 MMOL/L (ref 98–107)
CHOLEST SERPL-MCNC: 235 MG/DL (ref 0–200)
CO2 SERPL-SCNC: 27.5 MMOL/L (ref 22–29)
CREAT BLD-MCNC: 0.9 MG/DL (ref 0.57–1)
DEPRECATED RDW RBC AUTO: 46.4 FL (ref 37–54)
ERYTHROCYTE [DISTWIDTH] IN BLOOD BY AUTOMATED COUNT: 13.8 % (ref 11.5–15)
ERYTHROCYTE [SEDIMENTATION RATE] IN BLOOD: 13 MM/HR (ref 0–30)
GFR SERPL CREATININE-BSD FRML MDRD: 60 ML/MIN/1.73
GLOBULIN UR ELPH-MCNC: 2.9 GM/DL
GLUCOSE BLD-MCNC: 90 MG/DL (ref 65–99)
HCT VFR BLD AUTO: 40.9 % (ref 34.1–44.9)
HDLC SERPL-MCNC: 60 MG/DL (ref 40–60)
HGB BLD-MCNC: 13.2 G/DL (ref 11.2–15.7)
LDLC SERPL CALC-MCNC: 152 MG/DL (ref 0–100)
LDLC/HDLC SERPL: 2.54 {RATIO}
MCH RBC QN AUTO: 30.5 PG (ref 26–34)
MCHC RBC AUTO-ENTMCNC: 32.3 G/DL (ref 31–37)
MCV RBC AUTO: 94.5 FL (ref 80–100)
PLATELET # BLD AUTO: 223 10*3/MM3 (ref 150–450)
PMV BLD AUTO: 11.8 FL (ref 6–12)
POTASSIUM BLD-SCNC: 4.1 MMOL/L (ref 3.5–5.2)
PROT SERPL-MCNC: 7 G/DL (ref 6–8.5)
RBC # BLD AUTO: 4.33 10*6/MM3 (ref 3.93–5.22)
SODIUM BLD-SCNC: 143 MMOL/L (ref 136–145)
TRIGL SERPL-MCNC: 113 MG/DL (ref 0–150)
VLDLC SERPL-MCNC: 22.6 MG/DL (ref 5–40)
WBC NRBC COR # BLD: 5.56 10*3/MM3 (ref 5–10)

## 2019-02-08 PROCEDURE — 99213 OFFICE O/P EST LOW 20 MIN: CPT | Performed by: INTERNAL MEDICINE

## 2019-02-08 PROCEDURE — 80053 COMPREHEN METABOLIC PANEL: CPT | Performed by: INTERNAL MEDICINE

## 2019-02-08 PROCEDURE — 85027 COMPLETE CBC AUTOMATED: CPT | Performed by: INTERNAL MEDICINE

## 2019-02-08 PROCEDURE — G0439 PPPS, SUBSEQ VISIT: HCPCS | Performed by: INTERNAL MEDICINE

## 2019-02-08 PROCEDURE — 80061 LIPID PANEL: CPT | Performed by: INTERNAL MEDICINE

## 2019-02-08 PROCEDURE — 85652 RBC SED RATE AUTOMATED: CPT | Performed by: INTERNAL MEDICINE

## 2019-02-08 PROCEDURE — 36415 COLL VENOUS BLD VENIPUNCTURE: CPT | Performed by: INTERNAL MEDICINE

## 2019-02-08 RX ORDER — HYDROCHLOROTHIAZIDE 25 MG/1
25 TABLET ORAL DAILY
Qty: 90 TABLET | Refills: 1 | Status: SHIPPED | OUTPATIENT
Start: 2019-02-08 | End: 2020-06-08

## 2019-02-08 NOTE — PROGRESS NOTES
Chief Complaint   Patient presents with   • Medicare Wellness-subsequent   • Hypertension   • Hyperlipidemia       Subjective   Eliane Rao is a 83 y.o. female.     Hypertension   This is a chronic problem. The problem is controlled. Pertinent negatives include no blurred vision, chest pain, orthopnea, palpitations, peripheral edema, PND or shortness of breath. There are no associated agents to hypertension. Current antihypertension treatment includes diuretics, beta blockers and lifestyle changes. The current treatment provides moderate improvement. There are no compliance problems.    Hyperlipidemia   This is a chronic problem. The problem is controlled. Associated symptoms include myalgias. Pertinent negatives include no chest pain or shortness of breath. Current antihyperlipidemic treatment includes diet change (Lovastatin has been discontinued due to myalgias.  She has not noticed any improvement since stopping it.). Improvement on treatment: Significant improvement while on lovastatin.  However she is no longer taking it due to myalgias.    Despite stopping lovastatin, she reports continued once with her legs aching.  She also reports problems with arthritis.  Her finger joints feel stiff at times.    The following portions of the patient's history were reviewed and updated as appropriate: allergies, current medications, past family history, past medical history, past social history, past surgical history and problem list.    Review of Systems   Constitutional: Negative for appetite change.   HENT: Negative for nosebleeds.    Eyes: Negative for blurred vision and double vision.   Respiratory: Negative for cough and shortness of breath.    Cardiovascular: Negative for chest pain, palpitations, orthopnea, leg swelling and PND.   Musculoskeletal: Positive for myalgias.   Neurological: Negative for headache.         Current Outpatient Medications:   •  aspirin 81 MG tablet, Take 81 mg by mouth daily., Disp:  ", Rfl:   •  Calcium Carb-Cholecalciferol (CALTRATE 600+D) 600-800 MG-UNIT tablet, Take  by mouth., Disp: , Rfl:   •  hydrochlorothiazide (HYDRODIURIL) 25 MG tablet, Take 1 tablet by mouth Daily., Disp: 90 tablet, Rfl: 1  •  Lactobacillus Rhamnosus, GG, (CULTURELLE PO), Take  by mouth Daily., Disp: , Rfl:   •  metoprolol tartrate (LOPRESSOR) 100 MG tablet, TAKE ONE TABLET BY MOUTH TWICE A DAY, Disp: 180 tablet, Rfl: 1  •  montelukast (SINGULAIR) 10 MG tablet, Take 10 mg by mouth every night., Disp: , Rfl:         Objective     /90   Ht 154.9 cm (61\") Comment: measured today  Wt 58.1 kg (128 lb)   BMI 24.19 kg/m²     Physical Exam   Constitutional: She is oriented to person, place, and time. She appears well-developed and well-nourished. No distress.   Neck: Normal carotid pulses present. Carotid bruit is not present.   Cardiovascular: Regular rhythm, S1 normal and S2 normal. Exam reveals no gallop and no friction rub.   No murmur heard.  Pulses:       Carotid pulses are 2+ on the right side, and 2+ on the left side.  Pulmonary/Chest: Effort normal and breath sounds normal. She has no wheezes. She has no rhonchi. She has no rales. Chest wall is not dull to percussion.   Musculoskeletal: She exhibits no edema.   Neurological: She is alert and oriented to person, place, and time.   Skin: Skin is warm and dry.   Nursing note and vitals reviewed.        Assessment/Plan   Eliane was seen today for medicare wellness-subsequent, hypertension and hyperlipidemia.    Diagnoses and all orders for this visit:    Medicare annual wellness visit, subsequent    Visit for screening mammogram  -     Mammo Screening Bilateral With CAD; Future    Hypercholesterolemia    Essential hypertension  -     Comprehensive Metabolic Panel; Future  -     Lipid Panel; Future  -     CBC (No Diff); Future  -     Comprehensive Metabolic Panel  -     Lipid Panel  -     CBC (No Diff)    Arthritis  -     Sedimentation Rate; Future  -     " Sedimentation Rate    Other orders  -     hydrochlorothiazide (HYDRODIURIL) 25 MG tablet; Take 1 tablet by mouth Daily.

## 2019-02-08 NOTE — PROGRESS NOTES
QUICK REFERENCE INFORMATION:  The ABCs of the Annual Wellness Visit    Subsequent Medicare Wellness Visit    HEALTH RISK ASSESSMENT    1935    Recent Hospitalizations:  No hospitalization(s) within the last year..        Current Medical Providers:  Patient Care Team:  Ara Mcgrath MD as PCP - General  Ara Mcgrath MD as PCP - Family Medicine  Lauren Tobin APRN as PCP - Claims Attributed  Eduardo Goldberg MD as Consulting Physician (Gastroenterology)        Smoking Status:  Social History     Tobacco Use   Smoking Status Former Smoker   • Packs/day: 0.15   • Years: 10.00   • Pack years: 1.50   • Types: Cigarettes   • Last attempt to quit:    • Years since quittin.1   Smokeless Tobacco Never Used       Alcohol Consumption:  Social History     Substance and Sexual Activity   Alcohol Use Yes    Comment: Occasional       Depression Screen:   PHQ-2/PHQ-9 Depression Screening 2019   Little interest or pleasure in doing things 0   Feeling down, depressed, or hopeless 0   Trouble falling or staying asleep, or sleeping too much 0   Feeling tired or having little energy 0   Poor appetite or overeating 0   Feeling bad about yourself - or that you are a failure or have let yourself or your family down 0   Trouble concentrating on things, such as reading the newspaper or watching television 0   Moving or speaking so slowly that other people could have noticed. Or the opposite - being so fidgety or restless that you have been moving around a lot more than usual 0   Thoughts that you would be better off dead, or of hurting yourself in some way 0   Total Score 0       Health Habits and Functional and Cognitive Screening:  Functional & Cognitive Status 2019   Do you have difficulty preparing food and eating? No   Do you have difficulty bathing yourself, getting dressed or grooming yourself? No   Do you have difficulty using the toilet? No   Do you have difficulty moving around from place to place? No    Do you have trouble with steps or getting out of a bed or a chair? Yes   In the past year have you fallen or experienced a near fall? No   Current Diet Well Balanced Diet   Dental Exam Up to date   Eye Exam Up to date   Exercise (times per week) 2 times per week   Current Exercise Activities Include -   Do you need help using the phone?  No   Are you deaf or do you have serious difficulty hearing?  No   Do you need help with transportation? No   Do you need help shopping? No   Do you need help preparing meals?  No   Do you need help with housework?  No   Do you need help with laundry? No   Do you need help taking your medications? No   Do you need help managing money? No   Do you ever drive or ride in a car without wearing a seat belt? No   Have you felt unusual stress, anger or loneliness in the last month? No   Who do you live with? Alone   If you need help, do you have trouble finding someone available to you? No   Have you been bothered in the last four weeks by sexual problems? No   Do you have difficulty concentrating, remembering or making decisions? No           Does the patient have evidence of cognitive impairment? No    Aspirin use counseling: Taking ASA appropriately as indicated  She takes ASA about 3 times a week    Recent Lab Results:  CMP:  Lab Results   Component Value Date    GLU 99 07/05/2017    BUN 20 09/13/2018    CREATININE 0.96 09/13/2018    EGFRIFNONA 56 (L) 09/13/2018    EGFRIFAFRI 64 07/05/2017    BCR 20.8 09/13/2018     09/13/2018    K 4.3 09/13/2018    CO2 28.1 09/13/2018    CALCIUM 9.5 09/13/2018    PROTENTOTREF 7.0 07/05/2017    ALBUMIN 4.00 09/13/2018    LABGLOBREF 2.7 07/05/2017    LABIL2 1.6 07/05/2017    BILITOT 1.3 (H) 09/13/2018    ALKPHOS 58 09/13/2018    AST 21 09/13/2018    ALT 19 09/13/2018     Lipid Panel:  Lab Results   Component Value Date    CHOL 186 09/13/2018    TRIG 59 09/13/2018    HDL 76 (H) 09/13/2018    VLDL 11.8 09/13/2018    LDLHDL 1.29 09/13/2018      HbA1c:       Visual Acuity:  No exam data present    Age-appropriate Screening Schedule:  Refer to the list below for future screening recommendations based on patient's age, sex and/or medical conditions. Orders for these recommended tests are listed in the plan section. The patient has been provided with a written plan.    Health Maintenance   Topic Date Due   • TDAP/TD VACCINES (1 - Tdap) 08/10/1955   • ZOSTER VACCINE (2 of 3) 08/04/2014   • MAMMOGRAM  07/06/2016   • DXA SCAN  08/16/2019   • LIPID PANEL  09/13/2019   • COLONOSCOPY  02/04/2024   • INFLUENZA VACCINE  Completed   • PNEUMOCOCCAL VACCINES (65+ LOW/MEDIUM RISK)  Completed        Subjective   History of Present Illness    Eliane Rao is a 83 y.o. female who presents for an Subsequent Wellness Visit.    The following portions of the patient's history were reviewed and updated as appropriate: allergies, current medications, past family history, past medical history, past social history, past surgical history and problem list.    Outpatient Medications Prior to Visit   Medication Sig Dispense Refill   • aspirin 81 MG tablet Take 81 mg by mouth daily.     • Calcium Carb-Cholecalciferol (CALTRATE 600+D) 600-800 MG-UNIT tablet Take  by mouth.     • hydrochlorothiazide (HYDRODIURIL) 25 MG tablet Take 1 tablet by mouth Daily. 90 tablet 1   • Lactobacillus Rhamnosus, GG, (CULTURELLE PO) Take  by mouth Daily.     • metoprolol tartrate (LOPRESSOR) 100 MG tablet TAKE ONE TABLET BY MOUTH TWICE A  tablet 1   • montelukast (SINGULAIR) 10 MG tablet Take 10 mg by mouth every night.       No facility-administered medications prior to visit.        Patient Active Problem List   Diagnosis   • Hypercholesterolemia   • Hypertension   • Chronic renal insufficiency   • IBS (irritable bowel syndrome)       Advance Care Planning:  has NO advance directive - information provided to the patient today    Identification of Risk Factors:  Risk factors include: no  "risk factors.    Review of Systems    Compared to one year ago, the patient feels her physical health is the same.  Compared to one year ago, the patient feels her mental health is the same.    Objective     Physical Exam    Vitals:    02/08/19 0841   BP: 146/90   Weight: 58.1 kg (128 lb)   Height: 154.9 cm (61\")  Comment: measured today       Patient's Body mass index is 24.19 kg/m². BMI is within normal parameters. No follow-up required..      Assessment/Plan   Patient Self-Management and Personalized Health Advice  The patient has been provided with information about: diet, exercise and weight management and preventive services including:   · Advance directive, Screening mammography, referral placed.    Visit Diagnoses:  No diagnosis found.    No orders of the defined types were placed in this encounter.      Outpatient Encounter Medications as of 2/8/2019   Medication Sig Dispense Refill   • aspirin 81 MG tablet Take 81 mg by mouth daily.     • Calcium Carb-Cholecalciferol (CALTRATE 600+D) 600-800 MG-UNIT tablet Take  by mouth.     • hydrochlorothiazide (HYDRODIURIL) 25 MG tablet Take 1 tablet by mouth Daily. 90 tablet 1   • Lactobacillus Rhamnosus, GG, (CULTURELLE PO) Take  by mouth Daily.     • metoprolol tartrate (LOPRESSOR) 100 MG tablet TAKE ONE TABLET BY MOUTH TWICE A  tablet 1   • montelukast (SINGULAIR) 10 MG tablet Take 10 mg by mouth every night.       No facility-administered encounter medications on file as of 2/8/2019.        Reviewed use of high risk medication in the elderly: not applicable  Reviewed for potential of harmful drug interactions in the elderly: not applicable    Follow Up:  No Follow-up on file.     An After Visit Summary and PPPS with all of these plans were given to the patient.         "

## 2019-02-08 NOTE — PATIENT INSTRUCTIONS
Advance Directive  Advance directives are legal documents that let you make choices ahead of time about your health care and medical treatment in case you become unable to communicate for yourself. Advance directives are a way for you to communicate your wishes to family, friends, and health care providers. This can help convey your decisions about end-of-life care if you become unable to communicate.  Discussing and writing advance directives should happen over time rather than all at once. Advance directives can be changed depending on your situation and what you want, even after you have signed the advance directives.  If you do not have an advance directive, some states assign family decision makers to act on your behalf based on how closely you are related to them. Each state has its own laws regarding advance directives. You may want to check with your health care provider, , or state representative about the laws in your state. There are different types of advance directives, such as:  · Medical power of .  · Living will.  · Do not resuscitate (DNR) or do not attempt resuscitation (DNAR) order.    Health care proxy and medical power of   A health care proxy, also called a health care agent, is a person who is appointed to make medical decisions for you in cases in which you are unable to make the decisions yourself. Generally, people choose someone they know well and trust to represent their preferences. Make sure to ask this person for an agreement to act as your proxy. A proxy may have to exercise judgment in the event of a medical decision for which your wishes are not known.  A medical power of  is a legal document that names your health care proxy. Depending on the laws in your state, after the document is written, it may also need to be:  · Signed.  · Notarized.  · Dated.  · Copied.  · Witnessed.  · Incorporated into your medical record.    You may also want to appoint  someone to manage your financial affairs in a situation in which you are unable to do so. This is called a durable power of  for finances. It is a separate legal document from the durable power of  for health care. You may choose the same person or someone different from your health care proxy to act as your agent in financial matters.  If you do not appoint a proxy, or if there is a concern that the proxy is not acting in your best interests, a court-appointed guardian may be designated to act on your behalf.  Living will  A living will is a set of instructions documenting your wishes about medical care when you cannot express them yourself. Health care providers should keep a copy of your living will in your medical record. You may want to give a copy to family members or friends. To alert caregivers in case of an emergency, you can place a card in your wallet to let them know that you have a living will and where they can find it. A living will is used if you become:  · Terminally ill.  · Incapacitated.  · Unable to communicate or make decisions.    Items to consider in your living will include:  · The use or non-use of life-sustaining equipment, such as dialysis machines and breathing machines (ventilators).  · A DNR or DNAR order, which is the instruction not to use cardiopulmonary resuscitation (CPR) if breathing or heartbeat stops.  · The use or non-use of tube feeding.  · Withholding of food and fluids.  · Comfort (palliative) care when the goal becomes comfort rather than a cure.  · Organ and tissue donation.    A living will does not give instructions for distributing your money and property if you should pass away. It is recommended that you seek the advice of a  when writing a will. Decisions about taxes, beneficiaries, and asset distribution will be legally binding. This process can relieve your family and friends of any concerns surrounding disputes or questions that may come up  about the distribution of your assets.  DNR or DNAR  A DNR or DNAR order is a request not to have CPR in the event that your heart stops beating or you stop breathing. If a DNR or DNAR order has not been made and shared, a health care provider will try to help any patient whose heart has stopped or who has stopped breathing. If you plan to have surgery, talk with your health care provider about how your DNR or DNAR order will be followed if problems occur.  Summary  · Advance directives are the legal documents that allow you to make choices ahead of time about your health care and medical treatment in case you become unable to communicate for yourself.  · The process of discussing and writing advance directives should happen over time. You can change the advance directives, even after you have signed them.  · Advance directives include DNR or DNAR orders, living landis, and designating an agent as your medical power of .  This information is not intended to replace advice given to you by your health care provider. Make sure you discuss any questions you have with your health care provider.  Document Released: 2009 Document Revised: 2017 Document Reviewed: 2017  [employer] Interactive Patient Education © 2017 [employer]    Medicare Wellness  Personal Prevention Plan of Service     Date of Office Visit:  2019  Encounter Provider:  Ara Mcgrath MD  Place of Service:  [facility]  Patient Name: Eliane Rao  :  1935    As part of the Medicare Wellness portion of your visit today, we are providing you with this personalized preventive plan of services (PPPS). This plan is based upon recommendations of the [place] Preventive Services Task Force (USPSTF) and the Advisory Committee on Immunization Practices (ACIP).    This lists the preventive care services that should be considered, and provides dates of when you are due. Items listed  as completed are up-to-date and do not require any further intervention.    Health Maintenance   Topic Date Due   • TDAP/TD VACCINES (1 - Tdap) 08/10/1955   • ZOSTER VACCINE (2 of 3) 08/04/2014   • MAMMOGRAM  07/06/2016   • MEDICARE ANNUAL WELLNESS  07/05/2018   • DXA SCAN  08/16/2019   • LIPID PANEL  09/13/2019   • COLONOSCOPY  02/04/2024   • INFLUENZA VACCINE  Completed   • PNEUMOCOCCAL VACCINES (65+ LOW/MEDIUM RISK)  Completed     Check with your pharmacist about getting a Tdap (tetanus, diptheria and pertussis) vaccine and the new shingles (zoster) vaccine.  You had the old shingle vaccine in 2014.    Orders Placed This Encounter   Procedures   • Mammo Screening Bilateral With CAD     Standing Status:   Future     Standing Expiration Date:   2/8/2020     Order Specific Question:   Reason for Exam:     Answer:   screening   • Comprehensive Metabolic Panel     Standing Status:   Future     Number of Occurrences:   1   • Lipid Panel     Standing Status:   Future     Number of Occurrences:   1     Standing Expiration Date:   2/8/2020   • CBC (No Diff)     Standing Status:   Future     Number of Occurrences:   1     Standing Expiration Date:   2/8/2020   • Sedimentation Rate     Standing Status:   Future     Number of Occurrences:   1     Standing Expiration Date:   2/8/2020       Return in about 4 months (around 6/8/2019) for Follow up, Fasting, 30 (THIRTY) MIN.

## 2019-02-18 ENCOUNTER — OFFICE (OUTPATIENT)
Dept: URBAN - METROPOLITAN AREA CLINIC 66 | Facility: CLINIC | Age: 84
End: 2019-02-18

## 2019-02-18 VITALS
HEIGHT: 63 IN | DIASTOLIC BLOOD PRESSURE: 82 MMHG | HEART RATE: 72 BPM | SYSTOLIC BLOOD PRESSURE: 126 MMHG | WEIGHT: 128 LBS

## 2019-02-18 DIAGNOSIS — Z86.010 PERSONAL HISTORY OF COLONIC POLYPS: ICD-10-CM

## 2019-02-18 DIAGNOSIS — K57.30 DIVERTICULOSIS OF LARGE INTESTINE WITHOUT PERFORATION OR ABS: ICD-10-CM

## 2019-02-18 DIAGNOSIS — K52.832 LYMPHOCYTIC COLITIS: ICD-10-CM

## 2019-02-18 PROCEDURE — 99213 OFFICE O/P EST LOW 20 MIN: CPT | Performed by: NURSE PRACTITIONER

## 2019-04-03 ENCOUNTER — TELEPHONE (OUTPATIENT)
Dept: INTERNAL MEDICINE | Facility: CLINIC | Age: 84
End: 2019-04-03

## 2019-04-03 RX ORDER — LOVASTATIN 40 MG/1
40 TABLET ORAL NIGHTLY
Qty: 90 TABLET | Refills: 0 | Status: SHIPPED | OUTPATIENT
Start: 2019-04-03 | End: 2019-07-09 | Stop reason: SDUPTHER

## 2019-04-03 NOTE — TELEPHONE ENCOUNTER
"----- Message from Kay Tellez sent at 4/3/2019  8:49 AM EDT -----  Contact: Pt   Pt is calling to get her medication filled,   Lovastatin- last prescribed 1/5/18 2/8/19  lab work comments.-  \"Cholesterol levels are high.  Since stopping the lovastatin did not make any difference in how your muscles feel, I recommend you go back on it.\"    [facility] [address] - [phone]  - [phone] FX    Pt#[phone]   "

## 2019-06-04 RX ORDER — METOPROLOL TARTRATE 100 MG/1
TABLET ORAL
Qty: 180 TABLET | Refills: 0 | Status: SHIPPED | OUTPATIENT
Start: 2019-06-04 | End: 2019-09-10 | Stop reason: SDUPTHER

## 2019-07-09 DIAGNOSIS — E78.00 HYPERCHOLESTEROLEMIA: Primary | ICD-10-CM

## 2019-07-09 RX ORDER — LOVASTATIN 40 MG/1
TABLET ORAL
Qty: 90 TABLET | Refills: 0 | Status: SHIPPED | OUTPATIENT
Start: 2019-07-09 | End: 2019-10-14 | Stop reason: SDUPTHER

## 2019-08-02 ENCOUNTER — OFFICE VISIT (OUTPATIENT)
Dept: INTERNAL MEDICINE | Facility: CLINIC | Age: 84
End: 2019-08-02

## 2019-08-02 ENCOUNTER — APPOINTMENT (OUTPATIENT)
Dept: WOMENS IMAGING | Facility: HOSPITAL | Age: 84
End: 2019-08-02

## 2019-08-02 VITALS
SYSTOLIC BLOOD PRESSURE: 134 MMHG | HEIGHT: 61 IN | DIASTOLIC BLOOD PRESSURE: 74 MMHG | WEIGHT: 135 LBS | BODY MASS INDEX: 25.49 KG/M2

## 2019-08-02 DIAGNOSIS — E78.00 HYPERCHOLESTEROLEMIA: ICD-10-CM

## 2019-08-02 DIAGNOSIS — I10 ESSENTIAL HYPERTENSION: Primary | ICD-10-CM

## 2019-08-02 DIAGNOSIS — Z12.31 VISIT FOR SCREENING MAMMOGRAM: ICD-10-CM

## 2019-08-02 LAB
CHOLEST SERPL-MCNC: 156 MG/DL (ref 0–200)
HDLC SERPL-MCNC: 63 MG/DL (ref 40–60)
LDLC SERPL CALC-MCNC: 80 MG/DL (ref 0–100)
LDLC/HDLC SERPL: 1.27 {RATIO}
TRIGL SERPL-MCNC: 66 MG/DL (ref 0–150)
VLDLC SERPL-MCNC: 13.2 MG/DL (ref 5–40)

## 2019-08-02 PROCEDURE — 36415 COLL VENOUS BLD VENIPUNCTURE: CPT | Performed by: INTERNAL MEDICINE

## 2019-08-02 PROCEDURE — 77067 SCR MAMMO BI INCL CAD: CPT | Performed by: RADIOLOGY

## 2019-08-02 PROCEDURE — 80061 LIPID PANEL: CPT | Performed by: INTERNAL MEDICINE

## 2019-08-02 PROCEDURE — 77067 SCR MAMMO BI INCL CAD: CPT | Performed by: INTERNAL MEDICINE

## 2019-08-02 PROCEDURE — 99213 OFFICE O/P EST LOW 20 MIN: CPT | Performed by: INTERNAL MEDICINE

## 2019-08-02 NOTE — PROGRESS NOTES
Chief Complaint   Patient presents with   • Hyperlipidemia     4 month follow    • Hypertension       Subjective   Eliane Rao is a 83 y.o. female.     She comes in for follow-up of hyperlipidemia and hypertension.  These are chronic problems.  She continues on her medications as listed.  No symptoms of fatigue, dizziness, chest pressure, rapid or irregular heartbeats, PND, orthopnea, dyspnea on exertion.  At her last appointment, she was not taking lovastatin regularly.  Her cholesterol levels were elevated at that time.  She has since resumed lovastatin.  She is also following a prudent diet.  She admits she could do a little bit better with exercise.  No new complaints or concerns since her last appointment.         The following portions of the patient's history were reviewed and updated as appropriate: allergies, current medications, past family history, past medical history, past social history, past surgical history and problem list.    Review of Systems   Constitutional: Negative for appetite change.   HENT: Negative for nosebleeds.    Eyes: Negative for blurred vision and double vision.   Respiratory: Negative for cough and shortness of breath.    Cardiovascular: Negative for chest pain, palpitations and leg swelling.         Current Outpatient Medications:   •  aspirin 81 MG tablet, Take 81 mg by mouth daily., Disp: , Rfl:   •  Calcium Carb-Cholecalciferol (CALTRATE 600+D) 600-800 MG-UNIT tablet, Take  by mouth., Disp: , Rfl:   •  hydrochlorothiazide (HYDRODIURIL) 25 MG tablet, Take 1 tablet by mouth Daily., Disp: 90 tablet, Rfl: 1  •  Lactobacillus Rhamnosus, GG, (CULTURELLE PO), Take  by mouth Daily., Disp: , Rfl:   •  lovastatin (MEVACOR) 40 MG tablet, TAKE ONE TABLET BY MOUTH ONCE NIGHTLY, Disp: 90 tablet, Rfl: 0  •  metoprolol tartrate (LOPRESSOR) 100 MG tablet, TAKE ONE TABLET BY MOUTH TWICE A DAY, Disp: 180 tablet, Rfl: 0  •  montelukast (SINGULAIR) 10 MG tablet, Take 10 mg by mouth every  "night., Disp: , Rfl:         Objective     /74   Ht 154.9 cm (61\")   Wt 61.2 kg (135 lb)   BMI 25.51 kg/m²     Physical Exam   Constitutional: She is oriented to person, place, and time. She appears well-developed and well-nourished. No distress.   Neck: Normal carotid pulses present. Carotid bruit is not present.   Cardiovascular: Regular rhythm, S1 normal and S2 normal. Exam reveals no gallop and no friction rub.   No murmur heard.  Pulses:       Carotid pulses are 2+ on the right side, and 2+ on the left side.  Pulmonary/Chest: Effort normal and breath sounds normal. She has no wheezes. She has no rhonchi. She has no rales. Chest wall is not dull to percussion.   Musculoskeletal: She exhibits no edema.   Neurological: She is alert and oriented to person, place, and time.   Skin: Skin is warm and dry.   Psychiatric: She has a normal mood and affect.   Nursing note and vitals reviewed.    Lab Results   Component Value Date    CHOL 235 (H) 02/08/2019    CHLPL 181 07/05/2017    TRIG 113 02/08/2019    HDL 60 02/08/2019     (H) 02/08/2019      Lab Results   Component Value Date    GLUCOSE 90 02/08/2019    BUN 20 02/08/2019    CREATININE 0.90 02/08/2019    EGFRIFNONA 60 (L) 02/08/2019    EGFRIFAFRI 64 07/05/2017    BCR 22.2 02/08/2019    K 4.1 02/08/2019    CO2 27.5 02/08/2019    CALCIUM 9.7 02/08/2019    PROTENTOTREF 7.0 07/05/2017    ALBUMIN 4.10 02/08/2019    LABIL2 1.6 07/05/2017    AST 13 02/08/2019    ALT 12 02/08/2019         Assessment/Plan   Eliane was seen today for hyperlipidemia and hypertension.    Diagnoses and all orders for this visit:    Essential hypertension  -     Comprehensive Metabolic Panel; Future  -     Lipid Panel; Future    Hypercholesterolemia    Encouraged her to continue medications as listed, prudent diet, exercise regularly.  Labs pending.           "

## 2019-09-10 RX ORDER — METOPROLOL TARTRATE 100 MG/1
TABLET ORAL
Qty: 180 TABLET | Refills: 1 | Status: SHIPPED | OUTPATIENT
Start: 2019-09-10 | End: 2020-03-18

## 2019-10-14 DIAGNOSIS — E78.00 HYPERCHOLESTEROLEMIA: ICD-10-CM

## 2019-10-14 RX ORDER — LOVASTATIN 40 MG/1
TABLET ORAL
Qty: 90 TABLET | Refills: 0 | Status: SHIPPED | OUTPATIENT
Start: 2019-10-14 | End: 2020-01-15

## 2020-01-06 ENCOUNTER — OFFICE VISIT (OUTPATIENT)
Dept: INTERNAL MEDICINE | Facility: CLINIC | Age: 85
End: 2020-01-06

## 2020-01-06 ENCOUNTER — CLINICAL SUPPORT (OUTPATIENT)
Dept: INTERNAL MEDICINE | Facility: CLINIC | Age: 85
End: 2020-01-06

## 2020-01-06 VITALS
BODY MASS INDEX: 25.3 KG/M2 | OXYGEN SATURATION: 97 % | HEIGHT: 61 IN | SYSTOLIC BLOOD PRESSURE: 132 MMHG | DIASTOLIC BLOOD PRESSURE: 80 MMHG | HEART RATE: 51 BPM | WEIGHT: 134 LBS

## 2020-01-06 DIAGNOSIS — R82.90 ABNORMAL URINE FINDINGS: ICD-10-CM

## 2020-01-06 DIAGNOSIS — I10 ESSENTIAL HYPERTENSION: Primary | ICD-10-CM

## 2020-01-06 DIAGNOSIS — Z78.0 POST-MENOPAUSAL: ICD-10-CM

## 2020-01-06 DIAGNOSIS — E78.00 HYPERCHOLESTEROLEMIA: ICD-10-CM

## 2020-01-06 DIAGNOSIS — Z78.0 POST-MENOPAUSAL: Primary | ICD-10-CM

## 2020-01-06 LAB
ALBUMIN SERPL-MCNC: 4.3 G/DL (ref 3.5–5.2)
ALBUMIN/GLOB SERPL: 1.8 G/DL
ALP SERPL-CCNC: 65 U/L (ref 39–117)
ALT SERPL-CCNC: 13 U/L (ref 1–33)
AST SERPL-CCNC: 20 U/L (ref 1–32)
BILIRUB SERPL-MCNC: 1 MG/DL (ref 0.2–1.2)
BUN SERPL-MCNC: 22 MG/DL (ref 8–23)
BUN/CREAT SERPL: 23.7 (ref 7–25)
CALCIUM SERPL-MCNC: 9.4 MG/DL (ref 8.6–10.5)
CHLORIDE SERPL-SCNC: 104 MMOL/L (ref 98–107)
CHOLEST SERPL-MCNC: 184 MG/DL (ref 0–200)
CO2 SERPL-SCNC: 26.1 MMOL/L (ref 22–29)
CREAT SERPL-MCNC: 0.93 MG/DL (ref 0.57–1)
ERYTHROCYTE [DISTWIDTH] IN BLOOD BY AUTOMATED COUNT: 12.3 % (ref 12.3–15.4)
GLOBULIN SER CALC-MCNC: 2.4 GM/DL
GLUCOSE SERPL-MCNC: 86 MG/DL (ref 65–99)
HCT VFR BLD AUTO: 40.7 % (ref 34–46.6)
HDLC SERPL-MCNC: 75 MG/DL (ref 40–60)
HGB BLD-MCNC: 13.2 G/DL (ref 12–15.9)
LDLC SERPL CALC-MCNC: 93 MG/DL (ref 0–100)
MCH RBC QN AUTO: 30.6 PG (ref 26.6–33)
MCHC RBC AUTO-ENTMCNC: 32.4 G/DL (ref 31.5–35.7)
MCV RBC AUTO: 94.2 FL (ref 79–97)
PLATELET # BLD AUTO: 237 10*3/MM3 (ref 140–450)
POTASSIUM SERPL-SCNC: 4.6 MMOL/L (ref 3.5–5.2)
PROT SERPL-MCNC: 6.7 G/DL (ref 6–8.5)
RBC # BLD AUTO: 4.32 10*6/MM3 (ref 3.77–5.28)
SODIUM SERPL-SCNC: 143 MMOL/L (ref 136–145)
TRIGL SERPL-MCNC: 82 MG/DL (ref 0–150)
VLDLC SERPL-MCNC: 16.4 MG/DL
WBC # BLD AUTO: 5.16 10*3/MM3 (ref 3.4–10.8)

## 2020-01-06 PROCEDURE — 99213 OFFICE O/P EST LOW 20 MIN: CPT | Performed by: INTERNAL MEDICINE

## 2020-01-06 PROCEDURE — 77080 DXA BONE DENSITY AXIAL: CPT | Performed by: INTERNAL MEDICINE

## 2020-01-06 NOTE — PROGRESS NOTES
Chief Complaint   Patient presents with   • Hyperlipidemia     4 month follow up   • Hypertension       Subjective   Eliane Rao is a 84 y.o. female.     Hyperlipidemia   This is a chronic problem. The problem is controlled. Recent lipid tests were reviewed and are normal. She has no history of diabetes or obesity. Factors aggravating her hyperlipidemia include thiazides and beta blockers. Pertinent negatives include no chest pain or shortness of breath. Current antihyperlipidemic treatment includes statins, diet change and exercise. The current treatment provides significant improvement of lipids. There are no compliance problems.    Hypertension   This is a chronic problem. The problem is controlled. Pertinent negatives include no chest pain, headaches, orthopnea, palpitations, peripheral edema, PND or shortness of breath. Current antihypertension treatment includes beta blockers, diuretics and lifestyle changes. The current treatment provides moderate improvement. There are no compliance problems.  There is no history of CAD/MI or CVA.        The following portions of the patient's history were reviewed and updated as appropriate: allergies, current medications, past family history, past medical history, past social history, past surgical history and problem list.    Review of Systems   Respiratory: Negative for shortness of breath.    Cardiovascular: Negative for chest pain, palpitations, orthopnea and PND.         Current Outpatient Medications:   •  Calcium Carb-Cholecalciferol (CALTRATE 600+D) 600-800 MG-UNIT tablet, Take  by mouth., Disp: , Rfl:   •  hydrochlorothiazide (HYDRODIURIL) 25 MG tablet, Take 1 tablet by mouth Daily., Disp: 90 tablet, Rfl: 1  •  Lactobacillus Rhamnosus, GG, (CULTURELLE PO), Take  by mouth Daily., Disp: , Rfl:   •  lovastatin (MEVACOR) 40 MG tablet, TAKE ONE TABLET MOUTH ONCE NIGHTLY, Disp: 90 tablet, Rfl: 0  •  metoprolol tartrate (LOPRESSOR) 100 MG tablet, TAKE ONE TABLET BY  "MOUTH TWICE A DAY, Disp: 180 tablet, Rfl: 1  •  montelukast (SINGULAIR) 10 MG tablet, Take 10 mg by mouth every night., Disp: , Rfl:         Objective     /80   Pulse 51   Ht 154.9 cm (61\")   Wt 60.8 kg (134 lb)   SpO2 97%   BMI 25.32 kg/m²     Physical Exam   Constitutional: She is oriented to person, place, and time. She appears well-developed and well-nourished. No distress.   Neck: Normal carotid pulses present. Carotid bruit is not present.   Cardiovascular: Regular rhythm, S1 normal and S2 normal. Exam reveals no gallop and no friction rub.   No murmur heard.  Pulses:       Carotid pulses are 2+ on the right side, and 2+ on the left side.  Pulmonary/Chest: Effort normal and breath sounds normal. She has no wheezes. She has no rhonchi. She has no rales. Chest wall is not dull to percussion.   Musculoskeletal: She exhibits no edema.   Neurological: She is alert and oriented to person, place, and time.   Skin: Skin is warm and dry.   Nursing note and vitals reviewed.        Assessment/Plan   Eliane was seen today for hyperlipidemia and hypertension.    Diagnoses and all orders for this visit:    Essential hypertension  -     Comprehensive Metabolic Panel; Future  -     Urinalysis With Microscopic If Indicated (No Culture) - Urine, Clean Catch; Future  -     Lipid Panel; Future  -     CBC (No Diff); Future    Hypercholesterolemia               "

## 2020-01-07 LAB
APPEARANCE UR: ABNORMAL
BILIRUB UR QL STRIP: NEGATIVE
CASTS URNS MICRO: ABNORMAL
COLOR UR: YELLOW
CONV BACTERIA IN URINE MICRO: ABNORMAL /HPF
EPI CELLS #/AREA URNS HPF: ABNORMAL /HPF
GLUCOSE UR QL: NEGATIVE
HGB UR QL STRIP: NEGATIVE
KETONES UR QL STRIP: NEGATIVE
LEUKOCYTE ESTERASE UR QL STRIP: ABNORMAL
NITRITE UR QL STRIP: POSITIVE
PH UR STRIP.AUTO: 6.5 [PH] (ref 5–8)
PROTEIN: ABNORMAL
RBC #/AREA URNS HPF: ABNORMAL /HPF
SP GR UR: 1.02 (ref 1–1.03)
UROBILINOGEN UR STRIP-MCNC: ABNORMAL MG/DL
WBC #/AREA URNS HPF: ABNORMAL /HPF

## 2020-01-10 ENCOUNTER — TELEPHONE (OUTPATIENT)
Dept: INTERNAL MEDICINE | Facility: CLINIC | Age: 85
End: 2020-01-10

## 2020-01-10 LAB
BACTERIA UR CULT: ABNORMAL
Lab: ABNORMAL
SUSCEPTIBILITY TESTING: ABNORMAL

## 2020-01-10 RX ORDER — ALENDRONATE SODIUM 70 MG/1
70 TABLET ORAL
Qty: 12 TABLET | Refills: 3 | Status: SHIPPED | OUTPATIENT
Start: 2020-01-10 | End: 2020-06-08

## 2020-01-10 RX ORDER — NITROFURANTOIN 25; 75 MG/1; MG/1
100 CAPSULE ORAL 2 TIMES DAILY
Qty: 14 CAPSULE | Refills: 0 | Status: SHIPPED | OUTPATIENT
Start: 2020-01-10 | End: 2020-01-17

## 2020-01-10 NOTE — TELEPHONE ENCOUNTER
PT IS CALLING WOULD LIKE TO DISCUSS SOME QUESTIONS ABOUT RESULTS FOR THE BONE DENSITY TEST AND UA TEST     PLEASE ADVISE AND GIVE CALL AROUND 3:30PM TODAY

## 2020-01-15 DIAGNOSIS — E78.00 HYPERCHOLESTEROLEMIA: ICD-10-CM

## 2020-01-15 RX ORDER — LOVASTATIN 40 MG/1
TABLET ORAL
Qty: 90 TABLET | Refills: 1 | Status: SHIPPED | OUTPATIENT
Start: 2020-01-15 | End: 2020-07-21

## 2020-03-18 RX ORDER — METOPROLOL TARTRATE 100 MG/1
TABLET ORAL
Qty: 180 TABLET | Refills: 1 | Status: SHIPPED | OUTPATIENT
Start: 2020-03-18 | End: 2020-09-21

## 2020-03-27 ENCOUNTER — TELEPHONE (OUTPATIENT)
Dept: INTERNAL MEDICINE | Facility: CLINIC | Age: 85
End: 2020-03-27

## 2020-03-27 NOTE — TELEPHONE ENCOUNTER
PT IS REQUESTING A CALL BACK. SHE IS HAVING A BAD DIARRHEA FOR ALMOST A WEEK AND THINKS IT MAYBE IBS, AS SHE HAS BEEN TREATED FOR THIS BEFORE.

## 2020-03-27 NOTE — TELEPHONE ENCOUNTER
Discussed.  For the last 4-5 days, she has had 3-4 episodes of loose, watery bowel movement, dark brown, no fever. No nocturnal episodes, but does have one urgently first thing in the am. She has tried peptobismol. She also takes a probiotic.  She has had a little discomfort right before the bowel movements but otherwise no abdominal pain.  No change in her appetite.  Advised her she could try over-the-counter Imodium.  Continue with Pepto-Bismol and the probiotic.  If no help, we could try hyoscyamine or dicyclomine.

## 2020-05-21 ENCOUNTER — TELEPHONE (OUTPATIENT)
Dept: INTERNAL MEDICINE | Facility: CLINIC | Age: 85
End: 2020-05-21

## 2020-05-21 NOTE — TELEPHONE ENCOUNTER
PATIENT CALLED IN AND STATED SHE WAS BITE BY A ANIMAL WHILE WORKING IN HER GARD[place] AND THE REDNESS HAS TRAVELED . SWELLING IN FACE AND BITTEN NEAR HER HAIR LINE AND SWOLLEN  ON EYELID .  PLEASE CALL PATIENT AND ADVISE -[phone].

## 2020-05-21 NOTE — TELEPHONE ENCOUNTER
I have spoken to the Pt she stated that she thinks the swelling is traveling, Pt states she has been using cold compresses. Pt states she doesn't want to go to the ER, but the redness has gone down some, she just wants to know if she should be seen here or go back to the [facility], Please advise?    Pt has also been taking Benadryl PRN

## 2020-06-08 ENCOUNTER — OFFICE VISIT (OUTPATIENT)
Dept: INTERNAL MEDICINE | Facility: CLINIC | Age: 85
End: 2020-06-08

## 2020-06-08 DIAGNOSIS — I10 ESSENTIAL HYPERTENSION: Primary | ICD-10-CM

## 2020-06-08 PROCEDURE — 99441 PR PHYS/QHP TELEPHONE EVALUATION 5-10 MIN: CPT | Performed by: INTERNAL MEDICINE

## 2020-06-08 RX ORDER — AMLODIPINE BESYLATE 5 MG/1
5 TABLET ORAL DAILY
Qty: 90 TABLET | Refills: 1 | Status: SHIPPED | OUTPATIENT
Start: 2020-06-08 | End: 2020-09-30 | Stop reason: SDUPTHER

## 2020-06-08 NOTE — PROGRESS NOTES
Chief Complaint   Patient presents with   • Hypertension       Subjective   Eliane Rao is a 84 y.o. female.     You have chosen to receive care through a telephone visit. Do you consent to use a telephone visit for your medical care today? Yes     She was recently seen at an urgent care center for an insect bite.  Her blood pressure at that time was high.  Systolic was in the 180s.  Since then, she has been checking her blood pressure at home.  Today her blood pressure was 160/80.  Yesterday 154/78.  She takes hydrochlorothiazide 3-4 times a week instead of daily.  This is because it causes urinary frequency.  She continues to take metoprolol twice a day.    Hypertension   This is a chronic problem. The problem has been gradually worsening since onset. Associated symptoms include headaches ( Occasional). Pertinent negatives include no blurred vision, chest pain, orthopnea, palpitations, peripheral edema, PND or shortness of breath. Current antihypertension treatment includes beta blockers and diuretics. The current treatment provides moderate improvement. Compliance problems include medication side effects (She has not been taking hydrochlorothiazide daily.).         The following portions of the patient's history were reviewed and updated as appropriate: allergies, current medications, past family history, past medical history, past social history, past surgical history and problem list.    Review of Systems   Constitutional: Negative for appetite change.   HENT: Negative for nosebleeds.    Eyes: Negative for blurred vision and double vision.   Respiratory: Negative for cough and shortness of breath.    Cardiovascular: Negative for chest pain, palpitations, orthopnea, leg swelling and PND.   Neurological: Positive for headache ( a little).         Current Outpatient Medications:   •  amLODIPine (NORVASC) 5 MG tablet, Take 1 tablet by mouth Daily., Disp: 90 tablet, Rfl: 1  •  Calcium Carb-Cholecalciferol  (CALTRATE 600+D) 600-800 MG-UNIT tablet, Take  by mouth., Disp: , Rfl:   •  Lactobacillus Rhamnosus, GG, (CULTURELLE PO), Take  by mouth Daily., Disp: , Rfl:   •  lovastatin (MEVACOR) 40 MG tablet, TAKE ONE TABLET BY MOUTH ONCE NIGHTLY, Disp: 90 tablet, Rfl: 1  •  metoprolol tartrate (LOPRESSOR) 100 MG tablet, TAKE ONE TABLET BY MOUTH TWICE A DAY, Disp: 180 tablet, Rfl: 1  •  montelukast (SINGULAIR) 10 MG tablet, Take 10 mg by mouth every night., Disp: , Rfl:         Objective     There were no vitals taken for this visit.    Physical Exam   Pulmonary/Chest: No respiratory distress.   Neurological: She is alert.   Psychiatric: Her speech is normal.         Assessment/Plan   Eliane was seen today for hypertension.    Diagnoses and all orders for this visit:    Essential hypertension    Other orders  -     amLODIPine (NORVASC) 5 MG tablet; Take 1 tablet by mouth Daily.      Hypertension not optimally controlled.  Discussed.  She has not been taking hydrochlorothiazide but 3-4 times a week due to side effect of frequent urination.  We will stop that, start amlodipine in addition to metoprolol.  Telephone visit duration 10 minutes.  In addition to blood pressure, we discussed Shingrix vaccine.  She will check with her pharmacy to see if they are administering the Shingrix vaccine at this time.  Advised her that they may not be due to the current COVID pandemic.  Potential side effects to Shingrix include fever, myalgias, flulike symptoms.

## 2020-07-15 ENCOUNTER — OFFICE VISIT (OUTPATIENT)
Dept: INTERNAL MEDICINE | Facility: CLINIC | Age: 85
End: 2020-07-15

## 2020-07-15 VITALS
HEIGHT: 63 IN | WEIGHT: 132.6 LBS | DIASTOLIC BLOOD PRESSURE: 80 MMHG | SYSTOLIC BLOOD PRESSURE: 124 MMHG | HEART RATE: 55 BPM | BODY MASS INDEX: 23.5 KG/M2 | OXYGEN SATURATION: 97 %

## 2020-07-15 DIAGNOSIS — E78.00 HYPERCHOLESTEROLEMIA: ICD-10-CM

## 2020-07-15 DIAGNOSIS — I10 ESSENTIAL HYPERTENSION: ICD-10-CM

## 2020-07-15 DIAGNOSIS — Z00.00 MEDICARE ANNUAL WELLNESS VISIT, SUBSEQUENT: Primary | ICD-10-CM

## 2020-07-15 PROCEDURE — G0439 PPPS, SUBSEQ VISIT: HCPCS | Performed by: INTERNAL MEDICINE

## 2020-07-15 PROCEDURE — 99213 OFFICE O/P EST LOW 20 MIN: CPT | Performed by: INTERNAL MEDICINE

## 2020-07-15 NOTE — PATIENT INSTRUCTIONS
Medicare Wellness  Personal Prevention Plan of Service     Date of Office Visit:  07/15/2020  Encounter Provider:  Ara Mcgrath MD  Place of Service:  [facility] INTERNAL MEDICINE  Patient Name: Eliane Rao  :  1935    As part of the Medicare Wellness portion of your visit today, we are providing you with this personalized preventive plan of services (PPPS). This plan is based upon recommendations of the [place] Preventive Services Task Force (USPSTF) and the Advisory Committee on Immunization Practices (ACIP).    This lists the preventive care services that should be considered, and provides dates of when you are due. Items listed as completed are up-to-date and do not require any further intervention.    Health Maintenance   Topic Date Due   • TDAP/TD VACCINES (1 - Tdap) 08/10/1946   • ZOSTER VACCINE (2 of 3) 2014   • MEDICARE ANNUAL WELLNESS  2020   • INFLUENZA VACCINE  2020   • LIPID PANEL  2021   • MAMMOGRAM  2021   • DXA SCAN  2022   • COLONOSCOPY  2024   • Pneumococcal Vaccine Once at 65 Years Old  Completed       Orders Placed This Encounter   Procedures   • Urinalysis With Culture If Indicated - Urine, Clean Catch   • Lipid Panel With LDL / HDL Ratio   • Comprehensive Metabolic Panel       Return in about 4 months (around 11/15/2020) for Follow up, Fasting.    Check with your pharmacy about a tetanus shot (Tdap)

## 2020-07-15 NOTE — PROGRESS NOTES
The ABCs of the Annual Wellness Visit  Subsequent Medicare Wellness Visit    Chief Complaint   Patient presents with   • Medicare Wellness-subsequent   • Hypertension   • Hyperlipidemia       Subjective   History of Present Illness:  Eliane Rao is a 84 y.o. female who presents for a Subsequent Medicare Wellness Visit.    HEALTH RISK ASSESSMENT    Recent Hospitalizations:  No hospitalization(s) within the last year.    Current Medical Providers:  Patient Care Team:  Ara Mcgrath MD as PCP - General  Ara Mcgrath MD as PCP - Family Medicine  Ara Mcgrath MD as PCP - Claims Attributed  Eduardo Goldberg MD as Consulting Physician (Gastroenterology)    Smoking Status:  Social History     Tobacco Use   Smoking Status Former Smoker   • Packs/day: 0.15   • Years: 10.00   • Pack years: 1.50   • Types: Cigarettes   • Last attempt to quit:    • Years since quittin.5   Smokeless Tobacco Never Used       Alcohol Consumption:  Social History     Substance and Sexual Activity   Alcohol Use Yes    Comment: Occasional       Depression Screen:   PHQ-2/PHQ-9 Depression Screening 7/15/2020   Little interest or pleasure in doing things 0   Feeling down, depressed, or hopeless 0   Trouble falling or staying asleep, or sleeping too much 0   Feeling tired or having little energy 0   Poor appetite or overeating 0   Feeling bad about yourself - or that you are a failure or have let yourself or your family down 0   Trouble concentrating on things, such as reading the newspaper or watching television 0   Moving or speaking so slowly that other people could have noticed. Or the opposite - being so fidgety or restless that you have been moving around a lot more than usual 0   Thoughts that you would be better off dead, or of hurting yourself in some way 0   Total Score 0   If you checked off any problems, how difficult have these problems made it for you to do your work, take care of things at home, or get along with other  people? Not difficult at all       Fall Risk Screen:  RADHA Fall Risk Assessment was completed, and patient is at LOW risk for falls.Assessment completed on:7/15/2020    Health Habits and Functional and Cognitive Screening:  Functional & Cognitive Status 7/15/2020   Do you have difficulty preparing food and eating? No   Do you have difficulty bathing yourself, getting dressed or grooming yourself? No   Do you have difficulty using the toilet? No   Do you have difficulty moving around from place to place? No   Do you have trouble with steps or getting out of a bed or a chair? No   Current Diet Well Balanced Diet   Dental Exam Up to date   Eye Exam Up to date   Exercise (times per week) 3 times per week   Current Exercise Activities Include Walking   Do you need help using the phone?  No   Are you deaf or do you have serious difficulty hearing?  No   Do you need help with transportation? No   Do you need help shopping? No   Do you need help preparing meals?  No   Do you need help with housework?  No   Do you need help with laundry? No   Do you need help taking your medications? No   Do you need help managing money? No   Do you ever drive or ride in a car without wearing a seat belt? No   Have you felt unusual stress, anger or loneliness in the last month? No   Who do you live with? Alone   If you need help, do you have trouble finding someone available to you? No   Have you been bothered in the last four weeks by sexual problems? No   Do you have difficulty concentrating, remembering or making decisions? No   She is scheduled for dental and eye exams this fall.      Does the patient have evidence of cognitive impairment? No    Asprin use counseling:Does not need ASA (and currently is not on it)    Age-appropriate Screening Schedule:  Refer to the list below for future screening recommendations based on patient's age, sex and/or medical conditions. Orders for these recommended tests are listed in the plan section. The  patient has been provided with a written plan.    Health Maintenance   Topic Date Due   • TDAP/TD VACCINES (1 - Tdap) 08/10/1946   • ZOSTER VACCINE (2 of 3) 08/04/2014   • INFLUENZA VACCINE  08/01/2020   • LIPID PANEL  01/06/2021   • MAMMOGRAM  08/02/2021   • DXA SCAN  01/06/2022   • COLONOSCOPY  02/04/2024          The following portions of the patient's history were reviewed and updated as appropriate: allergies, current medications, past family history, past medical history, past social history, past surgical history and problem list.    Outpatient Medications Prior to Visit   Medication Sig Dispense Refill   • amLODIPine (NORVASC) 5 MG tablet Take 1 tablet by mouth Daily. 90 tablet 1   • Calcium Carb-Cholecalciferol (CALTRATE 600+D) 600-800 MG-UNIT tablet Take  by mouth.     • Lactobacillus Rhamnosus, GG, (CULTURELLE PO) Take  by mouth Daily.     • lovastatin (MEVACOR) 40 MG tablet TAKE ONE TABLET BY MOUTH ONCE NIGHTLY 90 tablet 1   • metoprolol tartrate (LOPRESSOR) 100 MG tablet TAKE ONE TABLET BY MOUTH TWICE A  tablet 1   • montelukast (SINGULAIR) 10 MG tablet Take 10 mg by mouth every night.       No facility-administered medications prior to visit.        Patient Active Problem List   Diagnosis   • Hypercholesterolemia   • Hypertension   • Chronic renal insufficiency   • IBS (irritable bowel syndrome)       Advanced Care Planning:  ACP discussion was held with the patient during this visit. She does not have advanced directive - has paperwork at home and just needs to complete it.    Review of Systems    Compared to one year ago, the patient feels her physical health is the same.  Compared to one year ago, the patient feels her mental health is the same.    Reviewed chart for potential of high risk medication in the elderly: yes  Reviewed chart for potential of harmful drug interactions in the elderly:yes    Objective         Vitals:    07/15/20 0857   BP: 124/80   BP Location: Left arm   Patient  "Position: Sitting   Cuff Size: Adult   Pulse: 55   SpO2: 97%   Weight: 60.1 kg (132 lb 9.6 oz)   Height: 160 cm (63\")       Body mass index is 23.49 kg/m².  Discussed the patient's BMI with her. The BMI is in the acceptable range.    Physical Exam          Assessment/Plan   Medicare Risks and Personalized Health Plan  CMS Preventative Services Quick Reference  Advance Directive Discussion  Immunizations Discussed/Encouraged (specific immunizations; adacel Tdap and Shingrix )    The above risks/problems have been discussed with the patient.  Pertinent information has been shared with the patient in the After Visit Summary.  Follow up plans and orders are seen below in the Assessment/Plan Section.    Diagnoses and all orders for this visit:    1. Medicare annual wellness visit, subsequent (Primary)    2. Essential hypertension  -     Urinalysis With Culture If Indicated - Urine, Clean Catch  -     Lipid Panel With LDL / HDL Ratio  -     Comprehensive Metabolic Panel    3. Hypercholesterolemia      Follow Up:  No follow-ups on file.     An After Visit Summary and PPPS were given to the patient.             "

## 2020-07-15 NOTE — PROGRESS NOTES
"Chief Complaint   Patient presents with   • Medicare Wellness-subsequent   • Hypertension   • Hyperlipidemia       Subjective   Eliane Rao is a 84 y.o. female.     She occasionally has \"blurred vision\".  Doesn't last long,  Just something that \"jumps in field of view\"    Hypertension   This is a chronic problem. The current episode started more than 1 year ago. The problem has been waxing and waning since onset. The problem is resistant. Associated symptoms include blurred vision. Pertinent negatives include no anxiety, chest pain, headaches, malaise/fatigue, neck pain, orthopnea, palpitations, peripheral edema, PND, shortness of breath or sweats. Agents associated with hypertension include decongestants. Risk factors for coronary artery disease include dyslipidemia and post-menopausal state. Current antihypertension treatment includes calcium channel blockers. There are no compliance problems.    Hyperlipidemia   This is a chronic problem. Recent lipid tests were reviewed and are normal. She has no history of diabetes or obesity. Pertinent negatives include no chest pain or shortness of breath. Current antihyperlipidemic treatment includes statins.        The following portions of the patient's history were reviewed and updated as appropriate: allergies, current medications, past family history, past medical history, past social history, past surgical history and problem list.    Review of Systems   Constitutional: Negative for appetite change and malaise/fatigue.   HENT: Negative for nosebleeds.    Eyes: Positive for blurred vision. Negative for double vision.   Respiratory: Negative for cough and shortness of breath.    Cardiovascular: Negative for chest pain, palpitations, orthopnea, leg swelling and PND.   Musculoskeletal: Negative for neck pain.         Current Outpatient Medications:   •  amLODIPine (NORVASC) 5 MG tablet, Take 1 tablet by mouth Daily., Disp: 90 tablet, Rfl: 1  •  Calcium " "Carb-Cholecalciferol (CALTRATE 600+D) 600-800 MG-UNIT tablet, Take  by mouth., Disp: , Rfl:   •  Lactobacillus Rhamnosus, GG, (CULTURELLE PO), Take  by mouth Daily., Disp: , Rfl:   •  lovastatin (MEVACOR) 40 MG tablet, TAKE ONE TABLET BY MOUTH ONCE NIGHTLY, Disp: 90 tablet, Rfl: 1  •  metoprolol tartrate (LOPRESSOR) 100 MG tablet, TAKE ONE TABLET BY MOUTH TWICE A DAY, Disp: 180 tablet, Rfl: 1  •  montelukast (SINGULAIR) 10 MG tablet, Take 10 mg by mouth every night., Disp: , Rfl:         Objective     /80 (BP Location: Left arm, Patient Position: Sitting, Cuff Size: Adult)   Pulse 55   Ht 160 cm (63\")   Wt 60.1 kg (132 lb 9.6 oz)   SpO2 97%   BMI 23.49 kg/m²     Physical Exam   Constitutional: She is oriented to person, place, and time. She appears well-developed and well-nourished. No distress.   Neck: Normal carotid pulses present. Carotid bruit is not present.   Cardiovascular: Regular rhythm, S1 normal and S2 normal. Exam reveals no gallop and no friction rub.   No murmur heard.  Pulses:       Carotid pulses are 2+ on the right side, and 2+ on the left side.  Pulmonary/Chest: Effort normal and breath sounds normal. She has no wheezes. She has no rhonchi. She has no rales. Chest wall is not dull to percussion.   Musculoskeletal: She exhibits no edema.   Neurological: She is alert and oriented to person, place, and time.   Skin: Skin is warm and dry.   Nursing note and vitals reviewed.        Assessment/Plan   Eliane was seen today for medicare wellness-subsequent, hypertension and hyperlipidemia.    Diagnoses and all orders for this visit:    Medicare annual wellness visit, subsequent    Essential hypertension  -     Urinalysis With Culture If Indicated - Urine, Clean Catch  -     Lipid Panel With LDL / HDL Ratio  -     Comprehensive Metabolic Panel    Hypercholesterolemia               Answers for HPI/ROS submitted by the patient on 7/8/2020   Hypertension  What is the primary reason for your visit?: " High Blood Pressure

## 2020-07-18 LAB
ALBUMIN SERPL-MCNC: 4.4 G/DL (ref 3.5–5.2)
ALBUMIN/GLOB SERPL: 2 G/DL
ALP SERPL-CCNC: 62 U/L (ref 39–117)
ALT SERPL-CCNC: 12 U/L (ref 1–33)
APPEARANCE UR: CLEAR
AST SERPL-CCNC: 18 U/L (ref 1–32)
BACTERIA #/AREA URNS HPF: ABNORMAL /HPF
BACTERIA UR CULT: ABNORMAL
BACTERIA UR CULT: ABNORMAL
BILIRUB SERPL-MCNC: 0.9 MG/DL (ref 0–1.2)
BILIRUB UR QL STRIP: NEGATIVE
BUN SERPL-MCNC: 21 MG/DL (ref 8–23)
BUN/CREAT SERPL: 21.2 (ref 7–25)
CALCIUM SERPL-MCNC: 9.3 MG/DL (ref 8.6–10.5)
CHLORIDE SERPL-SCNC: 104 MMOL/L (ref 98–107)
CHOLEST SERPL-MCNC: 177 MG/DL (ref 0–200)
CO2 SERPL-SCNC: 29.5 MMOL/L (ref 22–29)
COLOR UR: YELLOW
CREAT SERPL-MCNC: 0.99 MG/DL (ref 0.57–1)
EPI CELLS #/AREA URNS HPF: ABNORMAL /HPF (ref 0–10)
GLOBULIN SER CALC-MCNC: 2.2 GM/DL
GLUCOSE SERPL-MCNC: 95 MG/DL (ref 65–99)
GLUCOSE UR QL: NEGATIVE
HDLC SERPL-MCNC: 71 MG/DL (ref 40–60)
HGB UR QL STRIP: ABNORMAL
KETONES UR QL STRIP: NEGATIVE
LDLC SERPL CALC-MCNC: 90 MG/DL (ref 0–100)
LDLC/HDLC SERPL: 1.26 {RATIO}
LEUKOCYTE ESTERASE UR QL STRIP: ABNORMAL
MICRO URNS: ABNORMAL
MUCOUS THREADS URNS QL MICRO: PRESENT /HPF
NITRITE UR QL STRIP: NEGATIVE
OTHER ANTIBIOTIC SUSC ISLT: ABNORMAL
PH UR STRIP: 6.5 [PH] (ref 5–7.5)
POTASSIUM SERPL-SCNC: 4.5 MMOL/L (ref 3.5–5.2)
PROT SERPL-MCNC: 6.6 G/DL (ref 6–8.5)
PROT UR QL STRIP: NEGATIVE
RBC #/AREA URNS HPF: ABNORMAL /HPF (ref 0–2)
SODIUM SERPL-SCNC: 141 MMOL/L (ref 136–145)
SP GR UR: 1.01 (ref 1–1.03)
TRIGL SERPL-MCNC: 81 MG/DL (ref 0–150)
URINALYSIS REFLEX: ABNORMAL
UROBILINOGEN UR STRIP-MCNC: 0.2 MG/DL (ref 0.2–1)
VLDLC SERPL CALC-MCNC: 16.2 MG/DL
WBC #/AREA URNS HPF: >30 /HPF (ref 0–5)

## 2020-07-20 RX ORDER — NITROFURANTOIN 25; 75 MG/1; MG/1
100 CAPSULE ORAL 2 TIMES DAILY
Qty: 14 CAPSULE | Refills: 0 | Status: SHIPPED | OUTPATIENT
Start: 2020-07-20 | End: 2020-07-27

## 2020-07-21 DIAGNOSIS — E78.00 HYPERCHOLESTEROLEMIA: ICD-10-CM

## 2020-07-21 RX ORDER — LOVASTATIN 40 MG/1
TABLET ORAL
Qty: 90 TABLET | Refills: 1 | Status: SHIPPED | OUTPATIENT
Start: 2020-07-21 | End: 2021-01-25

## 2020-09-21 RX ORDER — METOPROLOL TARTRATE 100 MG/1
TABLET ORAL
Qty: 180 TABLET | Refills: 1 | Status: SHIPPED | OUTPATIENT
Start: 2020-09-21 | End: 2021-03-22

## 2020-09-23 ENCOUNTER — TELEPHONE (OUTPATIENT)
Dept: INTERNAL MEDICINE | Facility: CLINIC | Age: 85
End: 2020-09-23

## 2020-09-23 NOTE — TELEPHONE ENCOUNTER
PT STATES THAT HER MYCHART SAYS THAT SHE HAS AN APPOINTMENT TODAY AT 7:15AM.  PT STATES THAT SHE NEVER MADE THAT APPOINTMENT AND WANTS TO BE CALLED ABOUT THIS.      PT CALL BACK  [phone]

## 2020-09-24 NOTE — TELEPHONE ENCOUNTER
Spoke with patient notified she does not have any appointment missed for that time frame or future appointments early in the morning. She is going to discard message at it seems to be system error, reviewed chart with patient

## 2020-09-30 ENCOUNTER — OFFICE VISIT (OUTPATIENT)
Dept: CARDIOLOGY | Facility: CLINIC | Age: 85
End: 2020-09-30

## 2020-09-30 VITALS
WEIGHT: 134 LBS | BODY MASS INDEX: 23.74 KG/M2 | HEART RATE: 56 BPM | DIASTOLIC BLOOD PRESSURE: 98 MMHG | HEIGHT: 63 IN | SYSTOLIC BLOOD PRESSURE: 150 MMHG

## 2020-09-30 DIAGNOSIS — Q21.12 PFO (PATENT FORAMEN OVALE): Primary | ICD-10-CM

## 2020-09-30 DIAGNOSIS — E78.00 HYPERCHOLESTEROLEMIA: ICD-10-CM

## 2020-09-30 DIAGNOSIS — I10 ESSENTIAL HYPERTENSION: ICD-10-CM

## 2020-09-30 PROCEDURE — 93000 ELECTROCARDIOGRAM COMPLETE: CPT | Performed by: INTERNAL MEDICINE

## 2020-09-30 PROCEDURE — 99204 OFFICE O/P NEW MOD 45 MIN: CPT | Performed by: INTERNAL MEDICINE

## 2020-09-30 RX ORDER — AMLODIPINE BESYLATE 5 MG/1
5 TABLET ORAL 2 TIMES DAILY
Qty: 180 TABLET | Refills: 3 | OUTPATIENT
Start: 2020-09-30 | End: 2020-10-29

## 2020-09-30 RX ORDER — ASPIRIN 81 MG/1
81 TABLET ORAL 3 TIMES WEEKLY
COMMUNITY
End: 2022-06-01

## 2020-09-30 NOTE — PROGRESS NOTES
[facility] New Patient Office Note     Encounter Date:20  Patient:Eliane Rao  :1935  MRN:[id number]    Referring Provider:  Ara Mcgrath MD    Consulted for: PFO    Chief Complaint:   Chief Complaint   Patient presents with   • Establish Care   • PFO       History of Presenting Illness:      Ms. Rao is a 85 y.o. woman with past medical history notable for patent foramen ovale, hypertension, mixed hyperlipidemia who presents to our office to reestablish with a cardiologist.  Patient was seen approximately 4 years ago by Dr. Schwarz and is transitioning care over to myself.  Patient general has been doing quite well.  She does have a history of patent foramen ovale which was found incidentally during a stress test.  She has had no symptoms of TIA/stroke in the past.  In general her echocardiogram demonstrates normal structurally normal heart.  Fortunately there has not been any need for any repeat cardiac testing over the past couple years.    In general patient continues to do quite well clinically.  She again denies any history or new symptoms of TIA or stroke.  She does mention a brief episode of chest discomfort which occurs maybe once or twice a month located over the xiphoid process.  This discomfort will last for seconds may be at most 1 minute and resolve on its own.  The discomfort is random.  There are no inciting or alleviating factors.  There are no associated symptoms such as diaphoresis or nausea.  There are no radiation of the pain.  The pain is mild in severity.    Otherwise patient has been on a relatively stable blood pressure regimen.  She does have some labile component to the blood pressure as sometimes it will be in the 180s over 100s but sometimes on repeat checks it will improve.  She denies any issues with taking her medications or recent changes in her medical regimen.  She denies missing any recent dosages.    With regards to his mixed hyperlipidemia she is  malodorous statin therapy for a number of years without any significant side effects.      Review of Systems:  Review of Systems   Constitution: Negative.   HENT: Negative.    Eyes: Negative.    Cardiovascular: Negative.    Respiratory: Positive for snoring and wheezing.    Endocrine: Negative.    Hematologic/Lymphatic: Negative.    Skin: Negative.    Musculoskeletal: Positive for joint pain and joint swelling.   Gastrointestinal: Negative.    Genitourinary: Positive for frequency.   Neurological: Negative.    Psychiatric/Behavioral: Negative.    Allergic/Immunologic: Negative.        Prior to Admission medications    Medication Sig Start Date End Date Taking? Authorizing Provider   amLODIPine (NORVASC) 5 MG tablet Take 1 tablet by mouth Daily. 6/8/20  Yes Ara Mcgrath MD   aspirin 81 MG EC tablet Take 81 mg by mouth Daily.   Yes Oneil Bradshaw MD   Calcium Carb-Cholecalciferol (CALTRATE 600+D) 600-800 MG-UNIT tablet Take  by mouth. 3/6/13  Yes Oneil Bradshaw MD   Lactobacillus Rhamnosus, GG, (CULTURELLE PO) Take  by mouth Daily.   Yes Oneil Bradshaw MD   lovastatin (MEVACOR) 40 MG tablet TAKE ONE TABLET BY MOUTH ONCE NIGHTLY 7/21/20  Yes Ara Mcgrath MD   metoprolol tartrate (LOPRESSOR) 100 MG tablet TAKE ONE TABLET BY MOUTH TWICE A DAY 9/21/20  Yes Ara Mcgrath MD   montelukast (SINGULAIR) 10 MG tablet Take 10 mg by mouth every night.   Yes Oneil Bradshaw MD       Allergies   Allergen Reactions   • Sulfa Antibiotics        Past Medical History:   Diagnosis Date   • Asthma    • Chest pain    • Chronic renal insufficiency    • Diastolic dysfunction     grade 1   • Dyspnea    • Dysuria    • Hiatal hernia    • History of cataract    • Hyperlipidemia    • Hypertension    • IBS (irritable bowel syndrome)    • Insomnia    • Mitral regurgitation    • Multiple polyps of sigmoid colon    • Osteoporosis    • Patent foramen ovale    • Simple goiter    • Tricuspid regurgitation    • UTI  "(urinary tract infection)        Past Surgical History:   Procedure Laterality Date   • CATARACT EXTRACTION, BILATERAL     • COLONOSCOPY  2013       Social History     Socioeconomic History   • Marital status:      Spouse name: Not on file   • Number of children: Not on file   • Years of education: Not on file   • Highest education level: Not on file   Tobacco Use   • Smoking status: Former Smoker     Packs/day: 0.15     Years: 10.00     Pack years: 1.50     Types: Cigarettes     Quit date:      Years since quittin.7   • Smokeless tobacco: Never Used   Substance and Sexual Activity   • Alcohol use: Yes     Comment: Occasional/caffeine use   • Drug use: No       Family History   Problem Relation Age of Onset   • Cancer Mother         lung   • Diabetes Other    • Cancer Maternal Aunt         Bladder        The following portions of the patient's history were reviewed and updated as appropriate: allergies, current medications, past family history, past medical history, past social history, past surgical history and problem list.       Objective:       Vitals:    20 1404   BP: 150/98   BP Location: Left arm   Patient Position: Sitting   Pulse: 56   Weight: 60.8 kg (134 lb)   Height: 160 cm (63\")       Physical Exam:  Constitutional: Well appearing, well developed, no acute distress   HENT: Oropharynx clear and membrane moist  Eyes: Normal conjunctiva, no sclera icterus.  Neck: Supple, no carotid bruit bilaterally.  Cardiovascular: Regular rate and rhythm, No Murmur, No bilateral lower extremity edema.  Pulmonary: Normal respiratory effort, normal lung sounds, no wheezing.  Abdominal: Soft, nontender, no hepatosplenomegaly, liver is non-pulsatile.  Neurological: Alert and orient x 3.   Skin: Warm, dry, no ecchymosis, no rash.  Psych: Appropriate mood and affect. Normal judgment and insight.      Lab Results   Component Value Date    GLUCOSE 90 2019    BUN 21 07/15/2020    CREATININE " 0.99 07/15/2020    EGFRIFNONA 54 (L) 07/15/2020    EGFRIFAFRI 65 07/15/2020    BCR 21.2 07/15/2020    K 4.5 07/15/2020    CO2 29.5 (H) 07/15/2020    CALCIUM 9.3 07/15/2020    PROTENTOTREF 6.6 07/15/2020    ALBUMIN 4.40 07/15/2020    LABIL2 2.0 07/15/2020    AST 18 07/15/2020    ALT 12 07/15/2020       Lab Results   Component Value Date    WBC 5.16 01/06/2020    HGB 13.2 01/06/2020    HCT 40.7 01/06/2020    MCV 94.2 01/06/2020     01/06/2020       No results found for: CKTOTAL, CKMB, CKMBINDEX, TROPONINI, TROPONINT    Lab Results   Component Value Date    CHOL 156 08/02/2019    CHOL 235 (H) 02/08/2019    CHOL 186 09/13/2018    CHLPL 177 07/15/2020    CHLPL 184 01/06/2020    CHLPL 181 07/05/2017     Lab Results   Component Value Date    TRIG 81 07/15/2020    TRIG 82 01/06/2020    TRIG 66 08/02/2019     Lab Results   Component Value Date    HDL 71 (H) 07/15/2020    HDL 75 (H) 01/06/2020    HDL 63 (H) 08/02/2019     Lab Results   Component Value Date    LDL 90 07/15/2020    LDL 93 01/06/2020    LDL 80 08/02/2019       Lab Results   Component Value Date    TSH 1.87 03/05/2018         ECG 12 Lead    Date/Time: 9/30/2020 2:40 PM  Performed by: Fuad Juarez MD  Authorized by: Fuad Juarez MD   Comparison: compared with previous ECG from 8/15/2016  Similar to previous ECG  Rhythm: sinus rhythm    Clinical impression: normal ECG              Assessment:          Diagnosis Plan   1. PFO (patent foramen ovale)     2. Essential hypertension     3. Hypercholesterolemia            Plan:       Ms. Rao is a 85 y.o. woman with past medical history notable for patent foramen ovale, hypertension, mixed hyperlipidemia who presents to our office to reestablish with a cardiologist.  Overall patient is clinically doing quite well.  No repeat cardiac testing is needed at this time as her chest pain seems atypical for any coronary disease and she is already had prior work-up for her patent foramen ovale.  I would like  to try and optimize her blood pressure regimen and have asked her to take her amlodipine 5 mg twice a day instead of once a day see if this should improve her control.  She will check her blood pressure at home and let us know of progress.  She already has follow-up with her primary care physician next month and this can be checked with her as well.  Otherwise no changes are needed and will see her in 1 year    Hypertension:  · Will increase amlodipine from 5 mg daily to 5 mg twice a day which she will take with 100 mg of metoprolol heart rate.    Patent foramen ovale:  · No need for any further cardiac work-up or repeat imaging at this time given her age and lack of symptoms      Mixed hyperlipidemia:  · Continue statin      Follow-up:  1 year    Thank you for allowing me to participate in the care of Eliane Rao. Feel free to contact me directly with any further questions or concerns.    Fuad Juarez MD  [facility]  09/30/20  14:41 EDT

## 2020-11-17 ENCOUNTER — OFFICE VISIT (OUTPATIENT)
Dept: INTERNAL MEDICINE | Facility: CLINIC | Age: 85
End: 2020-11-17

## 2020-11-17 VITALS
WEIGHT: 133 LBS | DIASTOLIC BLOOD PRESSURE: 70 MMHG | OXYGEN SATURATION: 94 % | SYSTOLIC BLOOD PRESSURE: 122 MMHG | HEART RATE: 50 BPM | HEIGHT: 63 IN | BODY MASS INDEX: 23.57 KG/M2 | TEMPERATURE: 98.4 F

## 2020-11-17 DIAGNOSIS — E78.00 HYPERCHOLESTEROLEMIA: ICD-10-CM

## 2020-11-17 DIAGNOSIS — R82.90 ABNORMAL URINE FINDINGS: ICD-10-CM

## 2020-11-17 DIAGNOSIS — I10 ESSENTIAL HYPERTENSION: Primary | ICD-10-CM

## 2020-11-17 LAB
ALBUMIN SERPL-MCNC: 4.2 G/DL (ref 3.5–5.2)
ALBUMIN/GLOB SERPL: 1.6 G/DL
ALP SERPL-CCNC: 74 U/L (ref 39–117)
ALT SERPL-CCNC: 15 U/L (ref 1–33)
AST SERPL-CCNC: 17 U/L (ref 1–32)
BACTERIA UR QL AUTO: ABNORMAL /HPF
BILIRUB SERPL-MCNC: 0.8 MG/DL (ref 0–1.2)
BILIRUB UR QL STRIP: NEGATIVE
BUN SERPL-MCNC: 18 MG/DL (ref 8–23)
BUN/CREAT SERPL: 20.2 (ref 7–25)
CALCIUM SERPL-MCNC: 9.3 MG/DL (ref 8.6–10.5)
CHLORIDE SERPL-SCNC: 102 MMOL/L (ref 98–107)
CHOLEST SERPL-MCNC: 164 MG/DL (ref 0–200)
CHOLEST/HDLC SERPL: 2.13 {RATIO}
CLARITY UR: ABNORMAL
CO2 SERPL-SCNC: 29.9 MMOL/L (ref 22–29)
COLOR UR: YELLOW
CREAT SERPL-MCNC: 0.89 MG/DL (ref 0.57–1)
GLOBULIN SER CALC-MCNC: 2.6 GM/DL
GLUCOSE SERPL-MCNC: 95 MG/DL (ref 65–99)
GLUCOSE UR STRIP-MCNC: NEGATIVE MG/DL
HDLC SERPL-MCNC: 77 MG/DL (ref 40–60)
HGB UR QL STRIP.AUTO: ABNORMAL
HYALINE CASTS UR QL AUTO: ABNORMAL /LPF
KETONES UR QL STRIP: NEGATIVE
LDLC SERPL CALC-MCNC: 73 MG/DL (ref 0–100)
LEUKOCYTE ESTERASE UR QL STRIP.AUTO: ABNORMAL
MUCOUS THREADS URNS QL MICRO: ABNORMAL /HPF
NITRITE UR QL STRIP: POSITIVE
PH UR STRIP.AUTO: 7 [PH] (ref 5–8)
POTASSIUM SERPL-SCNC: 4.5 MMOL/L (ref 3.5–5.2)
PROT SERPL-MCNC: 6.8 G/DL (ref 6–8.5)
PROT UR QL STRIP: NEGATIVE
RBC # UR: ABNORMAL /HPF
REF LAB TEST METHOD: ABNORMAL
SODIUM SERPL-SCNC: 139 MMOL/L (ref 136–145)
SP GR UR STRIP: 1.02 (ref 1–1.03)
SQUAMOUS #/AREA URNS HPF: ABNORMAL /HPF
TRIGL SERPL-MCNC: 76 MG/DL (ref 0–150)
UROBILINOGEN UR QL STRIP: ABNORMAL
VLDLC SERPL CALC-MCNC: 14 MG/DL (ref 5–40)
WBC UR QL AUTO: ABNORMAL /HPF

## 2020-11-17 PROCEDURE — 99213 OFFICE O/P EST LOW 20 MIN: CPT | Performed by: INTERNAL MEDICINE

## 2020-11-17 PROCEDURE — 81001 URINALYSIS AUTO W/SCOPE: CPT | Performed by: INTERNAL MEDICINE

## 2020-11-17 RX ORDER — AMLODIPINE BESYLATE 5 MG/1
5 TABLET ORAL 2 TIMES DAILY
COMMUNITY
End: 2021-06-22

## 2020-11-17 RX ORDER — NITROFURANTOIN 25; 75 MG/1; MG/1
100 CAPSULE ORAL 2 TIMES DAILY
Qty: 14 CAPSULE | Refills: 0 | Status: SHIPPED | OUTPATIENT
Start: 2020-11-17 | End: 2020-11-24

## 2020-11-17 NOTE — PROGRESS NOTES
Chief Complaint   Patient presents with   • Hyperlipidemia   • Hypertension       Subjective   Eliane Rao is a 85 y.o. female.     Hyperlipidemia  This is a chronic problem. The problem is controlled. Recent lipid tests were reviewed and are normal. She has no history of diabetes or obesity. Associated symptoms include shortness of breath (a little with activity). Pertinent negatives include no chest pain. Current antihyperlipidemic treatment includes statins. The current treatment provides significant improvement of lipids.   Hypertension  This is a chronic problem. The current episode started more than 1 year ago. The problem has been waxing and waning since onset. The problem is controlled. Associated symptoms include peripheral edema and shortness of breath (a little with activity). Pertinent negatives include no anxiety, blurred vision, chest pain, headaches, malaise/fatigue, orthopnea, palpitations, PND or sweats. Agents associated with hypertension include NSAIDs. Risk factors for coronary artery disease include dyslipidemia. Current antihypertension treatment includes calcium channel blockers and beta blockers. Compliance problems include exercise.       Amlodipine was recently increased from 5 mgm once a day to 5 mgm twice a day by her cardiologist.       The following portions of the patient's history were reviewed and updated as appropriate: allergies, current medications, past family history, past medical history, past social history, past surgical history and problem list.    Review of Systems   Constitutional: Negative for appetite change, fever and malaise/fatigue.   HENT: Negative for nosebleeds.    Eyes: Negative for blurred vision and double vision.   Respiratory: Positive for shortness of breath (a little with activity). Negative for cough.    Cardiovascular: Negative for chest pain, palpitations, orthopnea and PND.   Neurological: Negative for dizziness and headache.         Current  "Outpatient Medications:   •  amLODIPine (NORVASC) 5 MG tablet, Take 5 mg by mouth 2 (two) times a day., Disp: , Rfl:   •  aspirin 81 MG EC tablet, Take 81 mg by mouth Daily., Disp: , Rfl:   •  Calcium Carb-Cholecalciferol (CALTRATE 600+D) 600-800 MG-UNIT tablet, Take  by mouth., Disp: , Rfl:   •  Lactobacillus Rhamnosus, GG, (CULTURELLE PO), Take  by mouth Daily., Disp: , Rfl:   •  lovastatin (MEVACOR) 40 MG tablet, TAKE ONE TABLET BY MOUTH ONCE NIGHTLY, Disp: 90 tablet, Rfl: 1  •  metoprolol tartrate (LOPRESSOR) 100 MG tablet, TAKE ONE TABLET BY MOUTH TWICE A DAY, Disp: 180 tablet, Rfl: 1  •  montelukast (SINGULAIR) 10 MG tablet, Take 10 mg by mouth every night., Disp: , Rfl:         Objective     /70 (BP Location: Left arm, Patient Position: Sitting, Cuff Size: Adult)   Pulse 50   Temp 98.4 °F (36.9 °C)   Ht 160 cm (63\")   Wt 60.3 kg (133 lb)   SpO2 94%   BMI 23.56 kg/m²     Physical Exam  Vitals signs and nursing note reviewed.   Constitutional:       General: She is not in acute distress.     Appearance: She is well-developed.   Neck:      Vascular: Normal carotid pulses. No carotid bruit.   Cardiovascular:      Rate and Rhythm: Regular rhythm.      Pulses:           Carotid pulses are 2+ on the right side and 2+ on the left side.     Heart sounds: S1 normal and S2 normal. No murmur. No friction rub. No gallop.    Pulmonary:      Effort: Pulmonary effort is normal.      Breath sounds: Normal breath sounds. No wheezing, rhonchi or rales.   Chest:      Chest wall: There is no dullness to percussion.   Skin:     General: Skin is warm and dry.   Neurological:      Mental Status: She is alert and oriented to person, place, and time.       Lab Results   Component Value Date    CHOL 156 08/02/2019    CHLPL 177 07/15/2020    TRIG 81 07/15/2020    HDL 71 (H) 07/15/2020    LDL 90 07/15/2020          Assessment/Plan   Diagnoses and all orders for this visit:    1. Essential hypertension (Primary)  -     " Comprehensive Metabolic Panel  -     Lipid Panel With / Chol / HDL Ratio  -     Urinalysis With Culture If Indicated - Urine, Clean Catch    2. Hypercholesterolemia    She will continue current medications.  She has noticed a little swelling in her feet recently.  Advised her this may be due to increased dose of amlodipine.

## 2020-11-19 LAB
BACTERIA UR CULT: ABNORMAL
BACTERIA UR CULT: ABNORMAL
OTHER ANTIBIOTIC SUSC ISLT: ABNORMAL

## 2021-01-25 DIAGNOSIS — E78.00 HYPERCHOLESTEROLEMIA: ICD-10-CM

## 2021-01-25 RX ORDER — LOVASTATIN 40 MG/1
TABLET ORAL
Qty: 90 TABLET | Refills: 1 | Status: SHIPPED | OUTPATIENT
Start: 2021-01-25 | End: 2021-07-30

## 2021-01-26 ENCOUNTER — DOCUMENTATION (OUTPATIENT)
Dept: INTERNAL MEDICINE | Facility: CLINIC | Age: 86
End: 2021-01-26

## 2021-01-26 NOTE — PROGRESS NOTES
COVID +  She tested positive for COVID on 1/24.  Her symptoms began on 1/22.  Discussed Bamlanivimab treatment with her. She states her symptoms are improving slowly.  Advised her if we did the Bamlanivimab treatment, it needed to be done within 10 days of symptom onset. Advised her Bamlanivimab has received EAU for treatment of COVID and it is investigational.  She will let me know on 1/28 how she is doing.  If still improving, likely would not benefit from Bamlanivimab

## 2021-01-27 ENCOUNTER — TELEPHONE (OUTPATIENT)
Dept: INTERNAL MEDICINE | Facility: CLINIC | Age: 86
End: 2021-01-27

## 2021-01-27 NOTE — TELEPHONE ENCOUNTER
Discussed with her .  Her symptoms remain mild. She is scheduled for COVID vaccine in early Feb.  We discussed the potential for reduced response to the vaccine if she has the antibody infusion. As this is possible, she will hold off on the infusion.

## 2021-01-27 NOTE — TELEPHONE ENCOUNTER
"My phone call is listed under a \"documentation note\" from yesterday.  If she wants to go ahead and  Have the Bamlanivimab infusion, I can fill out the order sheet.  "

## 2021-01-27 NOTE — TELEPHONE ENCOUNTER
Patient returning a call to Dr Mcgrath regarding an infusion for the covid. I was unable to warm transfer call to the office, no answer.    Best call back # [phone]

## 2021-01-28 ENCOUNTER — TELEPHONE (OUTPATIENT)
Dept: INTERNAL MEDICINE | Facility: CLINIC | Age: 86
End: 2021-01-28

## 2021-03-02 DIAGNOSIS — Z23 IMMUNIZATION DUE: ICD-10-CM

## 2021-03-19 ENCOUNTER — OFFICE VISIT (OUTPATIENT)
Dept: INTERNAL MEDICINE | Facility: CLINIC | Age: 86
End: 2021-03-19

## 2021-03-19 ENCOUNTER — TELEPHONE (OUTPATIENT)
Dept: INTERNAL MEDICINE | Facility: CLINIC | Age: 86
End: 2021-03-19

## 2021-03-19 VITALS
HEART RATE: 59 BPM | TEMPERATURE: 98.2 F | HEIGHT: 63 IN | WEIGHT: 132 LBS | DIASTOLIC BLOOD PRESSURE: 60 MMHG | SYSTOLIC BLOOD PRESSURE: 102 MMHG | BODY MASS INDEX: 23.39 KG/M2 | OXYGEN SATURATION: 97 %

## 2021-03-19 DIAGNOSIS — I10 ESSENTIAL HYPERTENSION: Primary | ICD-10-CM

## 2021-03-19 DIAGNOSIS — E78.00 HYPERCHOLESTEROLEMIA: ICD-10-CM

## 2021-03-19 DIAGNOSIS — I10 ESSENTIAL HYPERTENSION: Primary | Chronic | ICD-10-CM

## 2021-03-19 DIAGNOSIS — E78.00 HYPERCHOLESTEROLEMIA: Chronic | ICD-10-CM

## 2021-03-19 LAB
ALBUMIN SERPL-MCNC: 4.1 G/DL (ref 3.5–5.2)
ALBUMIN/GLOB SERPL: 1.8 G/DL
ALP SERPL-CCNC: 69 U/L (ref 39–117)
ALT SERPL-CCNC: 13 U/L (ref 1–33)
AST SERPL-CCNC: 18 U/L (ref 1–32)
BILIRUB SERPL-MCNC: 1 MG/DL (ref 0–1.2)
BUN SERPL-MCNC: 21 MG/DL (ref 8–23)
BUN/CREAT SERPL: 21.6 (ref 7–25)
CALCIUM SERPL-MCNC: 9.3 MG/DL (ref 8.6–10.5)
CHLORIDE SERPL-SCNC: 103 MMOL/L (ref 98–107)
CHOLEST SERPL-MCNC: 165 MG/DL (ref 0–200)
CHOLEST/HDLC SERPL: 2.32 {RATIO}
CO2 SERPL-SCNC: 28 MMOL/L (ref 22–29)
CREAT SERPL-MCNC: 0.97 MG/DL (ref 0.57–1)
ERYTHROCYTE [DISTWIDTH] IN BLOOD BY AUTOMATED COUNT: 12.8 % (ref 12.3–15.4)
GLOBULIN SER CALC-MCNC: 2.3 GM/DL
GLUCOSE SERPL-MCNC: 95 MG/DL (ref 65–99)
HCT VFR BLD AUTO: 39.6 % (ref 34–46.6)
HDLC SERPL-MCNC: 71 MG/DL (ref 40–60)
HGB BLD-MCNC: 13 G/DL (ref 12–15.9)
LDLC SERPL CALC-MCNC: 80 MG/DL (ref 0–100)
MCH RBC QN AUTO: 30.7 PG (ref 26.6–33)
MCHC RBC AUTO-ENTMCNC: 32.8 G/DL (ref 31.5–35.7)
MCV RBC AUTO: 93.6 FL (ref 79–97)
PLATELET # BLD AUTO: 238 10*3/MM3 (ref 140–450)
POTASSIUM SERPL-SCNC: 4.5 MMOL/L (ref 3.5–5.2)
PROT SERPL-MCNC: 6.4 G/DL (ref 6–8.5)
RBC # BLD AUTO: 4.23 10*6/MM3 (ref 3.77–5.28)
SODIUM SERPL-SCNC: 140 MMOL/L (ref 136–145)
TRIGL SERPL-MCNC: 75 MG/DL (ref 0–150)
VLDLC SERPL CALC-MCNC: 14 MG/DL (ref 5–40)
WBC # BLD AUTO: 5.29 10*3/MM3 (ref 3.4–10.8)

## 2021-03-19 PROCEDURE — 99214 OFFICE O/P EST MOD 30 MIN: CPT | Performed by: INTERNAL MEDICINE

## 2021-03-19 NOTE — TELEPHONE ENCOUNTER
Patient to come in August for AWV, only PM appointments available. Patient would like to have labs prior to visit in the AM. Please put in lab orders. Thank you.

## 2021-03-19 NOTE — PROGRESS NOTES
"Chief Complaint   Patient presents with   • Hypertension   • Hyperlipidemia        Subjective   Eliane Rao is a 85 y.o. female     HPI:     Hypertension: This is a chronic problem.  Current treatment includes amlodipine, metoprolol.  Prudent diet and regular exercise have also been recommended.  By report, there is good compliance with treatment and good tolerance of treatment.    Hyperlipidemia:    This is a chronic problem.  Her most recent lipid panel showed:  Lab Results   Component Value Date    CHOL 156 08/02/2019    CHLPL 164 11/17/2020    TRIG 76 11/17/2020    HDL 77 (H) 11/17/2020    LDL 73 11/17/2020     Current treatment: lovastatin.  Prudent diet and regular exercise have also been recommended.  No management changes were made at her last appointment.            The following portions of the patient's history were reviewed and updated as appropriate: allergies, current medications, past social history, problem list, past surgical history    Review of Systems   Constitutional: Negative for activity change and appetite change.   HENT: Negative for nosebleeds.    Eyes: Negative for visual disturbance.   Respiratory: Negative for cough and shortness of breath.    Cardiovascular: Negative for chest pain, palpitations and leg swelling.   Neurological: Negative for headaches.   Psychiatric/Behavioral: Negative for sleep disturbance.           Objective     /60   Pulse 59   Temp 98.2 °F (36.8 °C)   Ht 160 cm (63\")   Wt 59.9 kg (132 lb)   SpO2 97%   BMI 23.38 kg/m²      Physical Exam  Vitals and nursing note reviewed.   Constitutional:       General: She is not in acute distress.     Appearance: She is well-developed.   Neck:      Vascular: Normal carotid pulses. No carotid bruit.   Cardiovascular:      Rate and Rhythm: Regular rhythm.      Pulses:           Carotid pulses are 2+ on the right side and 2+ on the left side.     Heart sounds: S1 normal and S2 normal. No murmur heard.   No friction " rub. No gallop.    Pulmonary:      Effort: Pulmonary effort is normal.      Breath sounds: Normal breath sounds. No wheezing, rhonchi or rales.   Skin:     General: Skin is warm and dry.   Neurological:      Mental Status: She is alert and oriented to person, place, and time.         Assessment/Plan   Problems Addressed this Visit        Cardiac and Vasculature    Hypercholesterolemia (Chronic)    Relevant Orders    Lipid Panel With / Chol / HDL Ratio    Hypertension - Primary (Chronic)    Relevant Orders    Comprehensive Metabolic Panel    CBC (No Diff)      Diagnoses       Codes Comments    Essential hypertension    -  Primary ICD-10-CM: I10  ICD-9-CM: 401.9     Hypercholesterolemia     ICD-10-CM: E78.00  ICD-9-CM: 272.0         Her blood pressure is well controlled.  She has been taking amlodipine twice a day. Advised her she could reduce to once a day. Encouraged prudent diet and regular exercise.

## 2021-03-22 RX ORDER — METOPROLOL TARTRATE 100 MG/1
TABLET ORAL
Qty: 180 TABLET | Refills: 1 | Status: SHIPPED | OUTPATIENT
Start: 2021-03-22 | End: 2021-09-23

## 2021-07-30 DIAGNOSIS — E78.00 HYPERCHOLESTEROLEMIA: ICD-10-CM

## 2021-07-30 RX ORDER — LOVASTATIN 40 MG/1
TABLET ORAL
Qty: 90 TABLET | Refills: 1 | Status: SHIPPED | OUTPATIENT
Start: 2021-07-30 | End: 2022-02-07 | Stop reason: SDUPTHER

## 2021-08-16 ENCOUNTER — OFFICE VISIT (OUTPATIENT)
Dept: INTERNAL MEDICINE | Facility: CLINIC | Age: 86
End: 2021-08-16

## 2021-08-16 VITALS
BODY MASS INDEX: 23.92 KG/M2 | HEIGHT: 63 IN | TEMPERATURE: 98.6 F | WEIGHT: 135 LBS | DIASTOLIC BLOOD PRESSURE: 80 MMHG | SYSTOLIC BLOOD PRESSURE: 152 MMHG | HEART RATE: 59 BPM | OXYGEN SATURATION: 98 %

## 2021-08-16 DIAGNOSIS — Z00.00 MEDICARE ANNUAL WELLNESS VISIT, SUBSEQUENT: Primary | ICD-10-CM

## 2021-08-16 DIAGNOSIS — I10 ESSENTIAL HYPERTENSION: ICD-10-CM

## 2021-08-16 LAB
BACTERIA UR QL AUTO: ABNORMAL /HPF
BILIRUB UR QL STRIP: NEGATIVE
CLARITY UR: CLEAR
COLOR UR: YELLOW
GLUCOSE UR STRIP-MCNC: NEGATIVE MG/DL
HGB UR QL STRIP.AUTO: NEGATIVE
HYALINE CASTS UR QL AUTO: ABNORMAL /LPF
KETONES UR QL STRIP: NEGATIVE
LEUKOCYTE ESTERASE UR QL STRIP.AUTO: ABNORMAL
MUCOUS THREADS URNS QL MICRO: ABNORMAL /HPF
NITRITE UR QL STRIP: NEGATIVE
PH UR STRIP.AUTO: 6.5 [PH] (ref 5–8)
PROT UR QL STRIP: NEGATIVE
RBC # UR: ABNORMAL /HPF
REF LAB TEST METHOD: ABNORMAL
SP GR UR STRIP: 1.01 (ref 1–1.03)
SQUAMOUS #/AREA URNS HPF: ABNORMAL /HPF
UROBILINOGEN UR QL STRIP: ABNORMAL
WBC UR QL AUTO: ABNORMAL /HPF

## 2021-08-16 PROCEDURE — G0439 PPPS, SUBSEQ VISIT: HCPCS | Performed by: INTERNAL MEDICINE

## 2021-08-16 PROCEDURE — 81001 URINALYSIS AUTO W/SCOPE: CPT | Performed by: INTERNAL MEDICINE

## 2021-08-16 NOTE — PATIENT INSTRUCTIONS
Medicare Wellness  Personal Prevention Plan of Service     Date of Office Visit:  2021  Encounter Provider:  Ara Mcgrath MD  Place of Service:  [facility]  Patient Name: Eliane Rao  :  1935    As part of the Medicare Wellness portion of your visit today, we are providing you with this personalized preventive plan of services (PPPS). This plan is based upon recommendations of the [place] Preventive Services Task Force (USPSTF) and the Advisory Committee on Immunization Practices (ACIP).    This lists the preventive care services that should be considered, and provides dates of when you are due. Items listed as completed are up-to-date and do not require any further intervention.    Health Maintenance   Topic Date Due   • TDAP/TD VACCINES (1 - Tdap) Never done   • ANNUAL WELLNESS VISIT  07/15/2021   • MAMMOGRAM  2021   • INFLUENZA VACCINE  10/01/2021   • DXA SCAN  2022   • LIPID PANEL  2022   • COLORECTAL CANCER SCREENING  2024   • COVID-19 Vaccine  Completed   • Pneumococcal Vaccine 65+  Completed   • ZOSTER VACCINE  Completed       Orders Placed This Encounter   Procedures   • Urinalysis With Microscopic If Indicated (No Culture) - Urine, Clean Catch     Order Specific Question:   Release to patient     Answer:   Immediate       Return in about 6 months (around 2022) for Follow up, Fasting, mammogram.    Check with your pharmacy about a tetanus shot (Tdap)    Please send us a copy of your living will/advanced directive

## 2021-08-16 NOTE — PROGRESS NOTES
The ABCs of the Annual Wellness Visit  Subsequent Medicare Wellness Visit    Chief Complaint   Patient presents with   • Medicare Wellness-subsequent       Subjective   History of Present Illness:  Eliane Rao is a 86 y.o. female who presents for a Subsequent Medicare Wellness Visit.    HEALTH RISK ASSESSMENT    Recent Hospitalizations:  No hospitalization(s) within the last year.    Current Medical Providers:  Patient Care Team:  Ara Mcgrath MD as PCP - General  Ara Mcgrath MD as PCP - Family Medicine  [facility] (Gastroenterology)    Smoking Status:  Social History     Tobacco Use   Smoking Status Former Smoker   • Packs/day: 0.15   • Years: 10.00   • Pack years: 1.50   • Types: Cigarettes   • Quit date:    • Years since quittin.6   Smokeless Tobacco Never Used       Alcohol Consumption:  Social History     Substance and Sexual Activity   Alcohol Use Yes    Comment: Occasional/caffeine use       Depression Screen:   PHQ-2/PHQ-9 Depression Screening 2021   Little interest or pleasure in doing things 0   Feeling down, depressed, or hopeless 0   Trouble falling or staying asleep, or sleeping too much 0   Feeling tired or having little energy 0   Poor appetite or overeating 0   Feeling bad about yourself - or that you are a failure or have let yourself or your family down 0   Trouble concentrating on things, such as reading the newspaper or watching television 0   Moving or speaking so slowly that other people could have noticed. Or the opposite - being so fidgety or restless that you have been moving around a lot more than usual 0   Thoughts that you would be better off dead, or of hurting yourself in some way 0   Total Score 0   If you checked off any problems, how difficult have these problems made it for you to do your work, take care of things at home, or get along with other people? -       Fall Risk Screen:  STEADI Fall Risk Assessment was completed,  and patient is at LOW risk for falls.Assessment completed on:8/16/2021    Health Habits and Functional and Cognitive Screening:  Functional & Cognitive Status 8/16/2021   Do you have difficulty preparing food and eating? No   Do you have difficulty bathing yourself, getting dressed or grooming yourself? No   Do you have difficulty using the toilet? No   Do you have difficulty moving around from place to place? No   Do you have trouble with steps or getting out of a bed or a chair? No   Current Diet Well Balanced Diet   Dental Exam Up to date   Eye Exam Up to date   Exercise (times per week) 3 times per week   Current Exercises Include Walking   Current Exercise Activities Include -   Do you need help using the phone?  No   Are you deaf or do you have serious difficulty hearing?  No   Do you need help with transportation? No   Do you need help shopping? No   Do you need help preparing meals?  No   Do you need help with housework?  No   Do you need help with laundry? No   Do you need help taking your medications? No   Do you need help managing money? No   Do you ever drive or ride in a car without wearing a seat belt? No   Have you felt unusual stress, anger or loneliness in the last month? No   Who do you live with? Alone   If you need help, do you have trouble finding someone available to you? No   Have you been bothered in the last four weeks by sexual problems? No   Do you have difficulty concentrating, remembering or making decisions? No         Does the patient have evidence of cognitive impairment? No    Asprin use counseling:Taking ASA appropriately as indicated    Age-appropriate Screening Schedule:  Refer to the list below for future screening recommendations based on patient's age, sex and/or medical conditions. Orders for these recommended tests are listed in the plan section. The patient has been provided with a written plan.    Health Maintenance   Topic Date Due   • TDAP/TD VACCINES (1 - Tdap) Never  "done   • MAMMOGRAM  08/02/2021   • INFLUENZA VACCINE  10/01/2021   • DXA SCAN  01/06/2022   • LIPID PANEL  08/09/2022   • ZOSTER VACCINE  Completed          The following portions of the patient's history were reviewed and updated as appropriate: allergies, current medications, past family history, past medical history, past social history, past surgical history and problem list.    Outpatient Medications Prior to Visit   Medication Sig Dispense Refill   • AMLODIPINE BENZOATE PO Take 5 mg by mouth Daily.     • aspirin 81 MG EC tablet Take 81 mg by mouth 3 (Three) Times a Week.     • Calcium Carb-Cholecalciferol (CALTRATE 600+D) 600-800 MG-UNIT tablet Take  by mouth.     • Lactobacillus Rhamnosus, GG, (CULTURELLE PO) Take  by mouth Daily.     • lovastatin (MEVACOR) 40 MG tablet TAKE ONE TABLET BY MOUTH ONCE NIGHTLY 90 tablet 1   • metoprolol tartrate (LOPRESSOR) 100 MG tablet TAKE ONE TABLET BY MOUTH TWICE A  tablet 1   • montelukast (SINGULAIR) 10 MG tablet Take 10 mg by mouth every night.       No facility-administered medications prior to visit.       Patient Active Problem List   Diagnosis   • Hypercholesterolemia   • Hypertension   • Chronic renal insufficiency   • IBS (irritable bowel syndrome)   • PFO (patent foramen ovale)       Advanced Care Planning:  ACP discussion was held with the patient during this visit. Patient has an advance directive (not in EMR), copy requested.    Review of Systems    Compared to one year ago, the patient feels her physical health is the same.  Compared to one year ago, the patient feels her mental health is the same.    Reviewed chart for potential of high risk medication in the elderly: yes  Reviewed chart for potential of harmful drug interactions in the elderly:yes    Objective         Vitals:    08/16/21 1300   BP: 152/80   Pulse: 59   Temp: 98.6 °F (37 °C)   SpO2: 98%   Weight: 61.2 kg (135 lb)   Height: 160 cm (63\")   PainSc: 0-No pain       Body mass index is 23.91 " kg/m².  Discussed the patient's BMI with her. The BMI is in the acceptable range.    Physical Exam    Lab Results   Component Value Date     (H) 08/09/2021    CHLPL 167 08/09/2021    TRIG 81 08/09/2021    HDL 63 (H) 08/09/2021    LDL 89 08/09/2021    VLDL 15 08/09/2021        Assessment/Plan   Medicare Risks and Personalized Health Plan  CMS Preventative Services Quick Reference  Advance Directive Discussion  Breast Cancer/Mammogram Screening  Immunizations Discussed/Encouraged (specific immunizations; Tdap )    The above risks/problems have been discussed with the patient.  Pertinent information has been shared with the patient in the After Visit Summary.  Follow up plans and orders are seen below in the Assessment/Plan Section.    Diagnoses and all orders for this visit:    1. Medicare annual wellness visit, subsequent (Primary)    2. Essential hypertension  -     Urinalysis With Microscopic If Indicated (No Culture) - Urine, Clean Catch      Follow Up:  Return in about 6 months (around 2/16/2022) for Follow up, Fasting, mammogram.     An After Visit Summary and PPPS were given to the patient.

## 2021-08-23 ENCOUNTER — TELEPHONE (OUTPATIENT)
Dept: INTERNAL MEDICINE | Facility: CLINIC | Age: 86
End: 2021-08-23

## 2021-08-23 NOTE — TELEPHONE ENCOUNTER
Caller: Eliane Rao    Relationship: Self    Best call back number: [phone]     What is the best time to reach you: BEFORE 3 P.M TODAY OR BEGINNING TOMORROW MORNING    Who are you requesting to speak with (clinical staff, provider,  specific staff member): PROVIDER OR MEDICAL ASSISTANT     What was the call regarding: THE PATIENT HAS SOME QUESTIONS REGARDING THE COVID-19 BOOSTER VACCINATION.    Do you require a callback: YES, PLEASE

## 2021-09-23 RX ORDER — METOPROLOL TARTRATE 100 MG/1
TABLET ORAL
Qty: 180 TABLET | Refills: 1 | Status: SHIPPED | OUTPATIENT
Start: 2021-09-23 | End: 2022-03-29 | Stop reason: SDUPTHER

## 2021-11-01 ENCOUNTER — OFFICE VISIT (OUTPATIENT)
Dept: CARDIOLOGY | Facility: CLINIC | Age: 86
End: 2021-11-01

## 2021-11-01 VITALS
SYSTOLIC BLOOD PRESSURE: 154 MMHG | WEIGHT: 135.6 LBS | BODY MASS INDEX: 24.03 KG/M2 | HEIGHT: 63 IN | DIASTOLIC BLOOD PRESSURE: 98 MMHG | HEART RATE: 53 BPM

## 2021-11-01 DIAGNOSIS — I10 PRIMARY HYPERTENSION: Primary | Chronic | ICD-10-CM

## 2021-11-01 DIAGNOSIS — Q21.12 PFO (PATENT FORAMEN OVALE): ICD-10-CM

## 2021-11-01 DIAGNOSIS — R06.09 DOE (DYSPNEA ON EXERTION): ICD-10-CM

## 2021-11-01 DIAGNOSIS — E78.00 HYPERCHOLESTEROLEMIA: Chronic | ICD-10-CM

## 2021-11-01 PROCEDURE — 99214 OFFICE O/P EST MOD 30 MIN: CPT | Performed by: INTERNAL MEDICINE

## 2021-11-01 PROCEDURE — 93000 ELECTROCARDIOGRAM COMPLETE: CPT | Performed by: INTERNAL MEDICINE

## 2021-11-01 RX ORDER — LOSARTAN POTASSIUM 25 MG/1
25 TABLET ORAL DAILY
Qty: 90 TABLET | Refills: 3 | Status: SHIPPED | OUTPATIENT
Start: 2021-11-01 | End: 2022-08-04

## 2021-11-01 RX ORDER — AMLODIPINE BESYLATE 5 MG/1
5 TABLET ORAL DAILY
COMMUNITY
Start: 2021-09-23 | End: 2022-04-04

## 2021-11-01 NOTE — PROGRESS NOTES
[facility] Follow Up Office Note     Encounter Date:21  Patient:Eliane Rao  :1935  MRN:[id number]      Chief Complaint:   Chief Complaint   Patient presents with   • PFO     1 year f/u       History of Presenting Illness:      Ms. Rao is a 86 y.o. woman with past medical history notable for patent foramen ovale, hypertension, mixed hyperlipidemia who presents to our office for scheduled follow up.  Overall since her last appointment patient is doing reasonably well.  We did try and uptitrate her amlodipine at her last visit to try and optimize her blood pressure control but unfortunately this led to lower extremity edema and this was deescalated back to 5 mg daily.  Since then her leg swelling has improved but she has noticed shortness of breath.  She does have dyspnea on exertion.  She no longer has leg edema.  Blood pressures tend to run a little bit on the high side.      Review of Systems:  Review of Systems   Constitutional: Negative.   HENT: Negative.    Eyes: Negative.    Cardiovascular: Positive for dyspnea on exertion.   Respiratory: Positive for shortness of breath.    Endocrine: Negative.    Hematologic/Lymphatic: Negative.    Skin: Negative.    Musculoskeletal: Positive for joint pain and joint swelling.   Gastrointestinal: Negative.    Genitourinary: Positive for frequency.   Neurological: Negative.    Psychiatric/Behavioral: Negative.    Allergic/Immunologic: Negative.      Current Outpatient Medications on File Prior to Visit   Medication Sig Dispense Refill   • amLODIPine (NORVASC) 5 MG tablet Take 5 mg by mouth Daily.     • aspirin 81 MG EC tablet Take 81 mg by mouth 3 (Three) Times a Week.     • Calcium Carb-Cholecalciferol (CALTRATE 600+D) 600-800 MG-UNIT tablet Take  by mouth.     • Lactobacillus Rhamnosus, GG, (CULTURELLE PO) Take  by mouth Daily.     • lovastatin (MEVACOR) 40 MG tablet TAKE ONE TABLET BY MOUTH ONCE NIGHTLY 90 tablet 1   • metoprolol tartrate  (LOPRESSOR) 100 MG tablet TAKE ONE TABLET BY MOUTH TWICE A  tablet 1   • montelukast (SINGULAIR) 10 MG tablet Take 10 mg by mouth every night.     • [DISCONTINUED] AMLODIPINE BENZOATE PO Take 5 mg by mouth Daily.       No current facility-administered medications on file prior to visit.        Allergies   Allergen Reactions   • Sulfa Antibiotics Nausea Only and GI Intolerance       Past Medical History:   Diagnosis Date   • Asthma    • Chest pain    • Chronic renal insufficiency    • Diastolic dysfunction     grade 1   • Dyspnea    • Dysuria    • Hiatal hernia    • History of cataract    • Hyperlipidemia    • Hypertension    • IBS (irritable bowel syndrome)    • Insomnia    • Mitral regurgitation    • Multiple polyps of sigmoid colon    • Osteoporosis    • Patent foramen ovale    • Simple goiter    • Tricuspid regurgitation    • UTI (urinary tract infection)        Past Surgical History:   Procedure Laterality Date   • CATARACT EXTRACTION, BILATERAL     • COLONOSCOPY  2013       Social History     Socioeconomic History   • Marital status:    Tobacco Use   • Smoking status: Former Smoker     Packs/day: 0.15     Years: 10.00     Pack years: 1.50     Types: Cigarettes     Quit date:      Years since quittin.8   • Smokeless tobacco: Never Used   Vaping Use   • Vaping Use: Never used   Substance and Sexual Activity   • Alcohol use: Yes     Comment: Occasional/caffeine use   • Drug use: No   • Sexual activity: Defer       Family History   Problem Relation Age of Onset   • Cancer Mother         lung   • Diabetes Other    • Cancer Maternal Aunt         Bladder        The following portions of the patient's history were reviewed and updated as appropriate: allergies, current medications, past family history, past medical history, past social history, past surgical history and problem list.       Objective:       Vitals:    21 1239   BP: 154/98   BP Location: Left arm   Patient Position:  "Sitting   Pulse: 53   Weight: 61.5 kg (135 lb 9.6 oz)   Height: 160 cm (63\")       Physical Exam:  Constitutional: Well appearing, well developed, no acute distress   HENT: Oropharynx clear and membrane moist  Eyes: Normal conjunctiva, no sclera icterus.  Neck: Supple, no carotid bruit bilaterally.  Cardiovascular: Regular rate and rhythm, No Murmur, Trace bilateral lower extremity edema.  Pulmonary: Normal respiratory effort, normal lung sounds, no wheezing.  Abdominal: Soft, nontender, no hepatosplenomegaly, liver is non-pulsatile.  Neurological: Alert and orient x 3.   Skin: Warm, dry, no ecchymosis, no rash.  Psych: Appropriate mood and affect. Normal judgment and insight.      Lab Results   Component Value Date    GLUCOSE 102 (H) 08/09/2021    BUN 20 08/09/2021    CREATININE 0.98 08/09/2021    EGFRIFNONA 54 (L) 08/09/2021    EGFRIFAFRI 66 08/09/2021    BCR 20.4 08/09/2021    K 4.5 08/09/2021    CO2 24.8 08/09/2021    CALCIUM 9.5 08/09/2021    PROTENTOTREF 6.3 08/09/2021    ALBUMIN 4.30 08/09/2021    LABIL2 2.2 08/09/2021    AST 22 08/09/2021    ALT 14 08/09/2021       Lab Results   Component Value Date    WBC 5.29 03/19/2021    HGB 13.0 03/19/2021    HCT 39.6 03/19/2021    MCV 93.6 03/19/2021     03/19/2021       No results found for: CKTOTAL, CKMB, CKMBINDEX, TROPONINI, TROPONINT    Lab Results   Component Value Date    CHOL 156 08/02/2019    CHOL 235 (H) 02/08/2019    CHOL 186 09/13/2018    CHLPL 167 08/09/2021    CHLPL 165 03/19/2021    CHLPL 164 11/17/2020     Lab Results   Component Value Date    TRIG 81 08/09/2021    TRIG 75 03/19/2021    TRIG 76 11/17/2020     Lab Results   Component Value Date    HDL 63 (H) 08/09/2021    HDL 71 (H) 03/19/2021    HDL 77 (H) 11/17/2020     Lab Results   Component Value Date    LDL 89 08/09/2021    LDL 80 03/19/2021    LDL 73 11/17/2020       Lab Results   Component Value Date    TSH 1.87 03/05/2018         ECG 12 Lead    Date/Time: 11/1/2021 12:57 PM  Performed by: " Fuad Juarez MD  Authorized by: Fuad Juarez MD   Comparison: compared with previous ECG from 9/30/2020  Similar to previous ECG  Rhythm: sinus rhythm  Ectopy: atrial premature contractions              Assessment:          Diagnosis Plan   1. Primary hypertension  ECG 12 Lead   2. PFO (patent foramen ovale)  ECG 12 Lead   3. Hypercholesterolemia     4. SALAS (dyspnea on exertion)  Adult Transthoracic Echo Complete W/ Cont if Necessary Per Protocol          Plan:       Ms. Rao is a 86 y.o. woman with past medical history notable for patent foramen ovale, hypertension, mixed hyperlipidemia who presents to our office for scheduled follow up.  Overall patient doing reasonably well.  I would like to try and get little bit better blood pressure control.  Unfortunately amlodipine causes edema at higher dosing and we did discuss considering a diuretic but she already has nighttime frequency and would like to try a low-dose of losartan instead.  We will see how she does with this.  I have also ordered an echocardiogram given new shortness of breath and dyspnea on exertion to make sure there is no significant cardiac or structural changes with the heart.  Based upon these results and her response to starting losartan hopefully just getting blood her blood pressure control improve her symptoms but can further evaluate for other etiologies based upon her echo results and blood pressure response    Dyspnea on exertion:  · Follow-up echocardiogram  · We will monitor response to achieving better blood pressure control    Hypertension:  · Will try adding losartan 25 mg daily  · Continue amlodipine from 5 mg daily as had edema with BID dosing  · Continue 100 mg of metoprolol heart rate.    Patent foramen ovale:  · Likely an incidental finding at this point given her advanced age  · We will follow up on repeat echocardiogram    Mixed hyperlipidemia:  · Continue statin  · Lipid panel 8/2021 demonstrates reasonable  control of LDL  · CMP 8/21 demonstrates normal LFT and AST      Follow-up:  1 year    Thank you for allowing me to participate in the care of Eliane LOYDA Rao. Feel free to contact me directly with any further questions or concerns.    Fuad Juarez MD  [facility]  11/01/21  13:06 EDT

## 2021-11-12 ENCOUNTER — HOSPITAL ENCOUNTER (OUTPATIENT)
Dept: CARDIOLOGY | Facility: HOSPITAL | Age: 86
Discharge: HOME | End: 2021-11-12
Admitting: INTERNAL MEDICINE

## 2021-11-12 VITALS
HEIGHT: 63 IN | HEART RATE: 65 BPM | DIASTOLIC BLOOD PRESSURE: 78 MMHG | SYSTOLIC BLOOD PRESSURE: 153 MMHG | BODY MASS INDEX: 23.92 KG/M2 | WEIGHT: 135 LBS

## 2021-11-12 DIAGNOSIS — R06.09 DOE (DYSPNEA ON EXERTION): ICD-10-CM

## 2021-11-12 PROCEDURE — 93306 TTE W/DOPPLER COMPLETE: CPT

## 2021-11-12 PROCEDURE — 93306 TTE W/DOPPLER COMPLETE: CPT | Performed by: INTERNAL MEDICINE

## 2021-11-15 LAB
AORTIC ARCH: 2 CM
ASCENDING AORTA: 2.4 CM
BH CV ECHO MEAS - ACS: 1.6 CM
BH CV ECHO MEAS - AO MAX PG (FULL): 2.5 MMHG
BH CV ECHO MEAS - AO MAX PG: 7.2 MMHG
BH CV ECHO MEAS - AO MEAN PG (FULL): 0.68 MMHG
BH CV ECHO MEAS - AO MEAN PG: 3.2 MMHG
BH CV ECHO MEAS - AO ROOT AREA (BSA CORRECTED): 1.7
BH CV ECHO MEAS - AO ROOT AREA: 6.4 CM^2
BH CV ECHO MEAS - AO ROOT DIAM: 2.9 CM
BH CV ECHO MEAS - AO V2 MAX: 134.4 CM/SEC
BH CV ECHO MEAS - AO V2 MEAN: 82.7 CM/SEC
BH CV ECHO MEAS - AO V2 VTI: 27.1 CM
BH CV ECHO MEAS - ASC AORTA: 2.4 CM
BH CV ECHO MEAS - AVA(I,A): 2.2 CM^2
BH CV ECHO MEAS - AVA(I,D): 2.2 CM^2
BH CV ECHO MEAS - AVA(V,A): 1.9 CM^2
BH CV ECHO MEAS - AVA(V,D): 1.9 CM^2
BH CV ECHO MEAS - BSA(HAYCOCK): 1.7 M^2
BH CV ECHO MEAS - BSA: 1.6 M^2
BH CV ECHO MEAS - BZI_BMI: 23.9 KILOGRAMS/M^2
BH CV ECHO MEAS - BZI_METRIC_HEIGHT: 160 CM
BH CV ECHO MEAS - BZI_METRIC_WEIGHT: 61.2 KG
BH CV ECHO MEAS - EDV(MOD-SP2): 53 ML
BH CV ECHO MEAS - EDV(MOD-SP4): 54 ML
BH CV ECHO MEAS - EDV(TEICH): 75.3 ML
BH CV ECHO MEAS - EF(CUBED): 75.7 %
BH CV ECHO MEAS - EF(MOD-BP): 61 %
BH CV ECHO MEAS - EF(MOD-SP2): 58.5 %
BH CV ECHO MEAS - EF(MOD-SP4): 63 %
BH CV ECHO MEAS - EF(TEICH): 68.1 %
BH CV ECHO MEAS - ESV(MOD-SP2): 22 ML
BH CV ECHO MEAS - ESV(MOD-SP4): 20 ML
BH CV ECHO MEAS - ESV(TEICH): 24 ML
BH CV ECHO MEAS - FS: 37.6 %
BH CV ECHO MEAS - IVS/LVPW: 0.95
BH CV ECHO MEAS - IVSD: 0.88 CM
BH CV ECHO MEAS - LAT PEAK E' VEL: 6.9 CM/SEC
BH CV ECHO MEAS - LV DIASTOLIC VOL/BSA (35-75): 33 ML/M^2
BH CV ECHO MEAS - LV MASS(C)D: 116 GRAMS
BH CV ECHO MEAS - LV MASS(C)DI: 70.9 GRAMS/M^2
BH CV ECHO MEAS - LV MAX PG: 4.7 MMHG
BH CV ECHO MEAS - LV MEAN PG: 2.5 MMHG
BH CV ECHO MEAS - LV SYSTOLIC VOL/BSA (12-30): 12.2 ML/M^2
BH CV ECHO MEAS - LV V1 MAX: 108.4 CM/SEC
BH CV ECHO MEAS - LV V1 MEAN: 73.8 CM/SEC
BH CV ECHO MEAS - LV V1 VTI: 25 CM
BH CV ECHO MEAS - LVIDD: 4.1 CM
BH CV ECHO MEAS - LVIDS: 2.6 CM
BH CV ECHO MEAS - LVLD AP2: 6.2 CM
BH CV ECHO MEAS - LVLD AP4: 6.6 CM
BH CV ECHO MEAS - LVLS AP2: 6 CM
BH CV ECHO MEAS - LVLS AP4: 5.5 CM
BH CV ECHO MEAS - LVOT AREA (M): 2.3 CM^2
BH CV ECHO MEAS - LVOT AREA: 2.4 CM^2
BH CV ECHO MEAS - LVOT DIAM: 1.7 CM
BH CV ECHO MEAS - LVPWD: 0.93 CM
BH CV ECHO MEAS - MED PEAK E' VEL: 7.4 CM/SEC
BH CV ECHO MEAS - MR MAX PG: 79.3 MMHG
BH CV ECHO MEAS - MR MAX VEL: 445.2 CM/SEC
BH CV ECHO MEAS - MV A DUR: 0.12 SEC
BH CV ECHO MEAS - MV A MAX VEL: 70.3 CM/SEC
BH CV ECHO MEAS - MV DEC SLOPE: 164.7 CM/SEC^2
BH CV ECHO MEAS - MV DEC TIME: 0.18 SEC
BH CV ECHO MEAS - MV E MAX VEL: 92.5 CM/SEC
BH CV ECHO MEAS - MV E/A: 1.3
BH CV ECHO MEAS - MV MAX PG: 3 MMHG
BH CV ECHO MEAS - MV MEAN PG: 1 MMHG
BH CV ECHO MEAS - MV P1/2T MAX VEL: 76 CM/SEC
BH CV ECHO MEAS - MV P1/2T: 135.2 MSEC
BH CV ECHO MEAS - MV V2 MAX: 86.9 CM/SEC
BH CV ECHO MEAS - MV V2 MEAN: 45.4 CM/SEC
BH CV ECHO MEAS - MV V2 VTI: 31.9 CM
BH CV ECHO MEAS - MVA P1/2T LCG: 2.9 CM^2
BH CV ECHO MEAS - MVA(P1/2T): 1.6 CM^2
BH CV ECHO MEAS - MVA(VTI): 1.9 CM^2
BH CV ECHO MEAS - PA ACC TIME: 0.15 SEC
BH CV ECHO MEAS - PA MAX PG (FULL): 1.3 MMHG
BH CV ECHO MEAS - PA MAX PG: 2.4 MMHG
BH CV ECHO MEAS - PA PR(ACCEL): 12.5 MMHG
BH CV ECHO MEAS - PA V2 MAX: 77.4 CM/SEC
BH CV ECHO MEAS - PULM A REVS DUR: 0.12 SEC
BH CV ECHO MEAS - PULM A REVS VEL: 22.7 CM/SEC
BH CV ECHO MEAS - PULM DIAS VEL: 27.4 CM/SEC
BH CV ECHO MEAS - PULM S/D: 1.3
BH CV ECHO MEAS - PULM SYS VEL: 35.6 CM/SEC
BH CV ECHO MEAS - PVA(V,A): 1.7 CM^2
BH CV ECHO MEAS - PVA(V,D): 1.7 CM^2
BH CV ECHO MEAS - QP/QS: 0.59
BH CV ECHO MEAS - RAP SYSTOLE: 3 MMHG
BH CV ECHO MEAS - RV MAX PG: 1.1 MMHG
BH CV ECHO MEAS - RV MEAN PG: 0.61 MMHG
BH CV ECHO MEAS - RV V1 MAX: 52.3 CM/SEC
BH CV ECHO MEAS - RV V1 MEAN: 37.2 CM/SEC
BH CV ECHO MEAS - RV V1 VTI: 14.4 CM
BH CV ECHO MEAS - RVOT AREA: 2.5 CM^2
BH CV ECHO MEAS - RVOT DIAM: 1.8 CM
BH CV ECHO MEAS - RVSP: 39 MMHG
BH CV ECHO MEAS - SI(AO): 106.3 ML/M^2
BH CV ECHO MEAS - SI(CUBED): 32.5 ML/M^2
BH CV ECHO MEAS - SI(LVOT): 36.4 ML/M^2
BH CV ECHO MEAS - SI(MOD-SP2): 18.9 ML/M^2
BH CV ECHO MEAS - SI(MOD-SP4): 20.8 ML/M^2
BH CV ECHO MEAS - SI(TEICH): 31.4 ML/M^2
BH CV ECHO MEAS - SUP REN AO DIAM: 2 CM
BH CV ECHO MEAS - SV(AO): 173.9 ML
BH CV ECHO MEAS - SV(CUBED): 53.1 ML
BH CV ECHO MEAS - SV(LVOT): 59.6 ML
BH CV ECHO MEAS - SV(MOD-SP2): 31 ML
BH CV ECHO MEAS - SV(MOD-SP4): 34 ML
BH CV ECHO MEAS - SV(RVOT): 35.4 ML
BH CV ECHO MEAS - SV(TEICH): 51.3 ML
BH CV ECHO MEAS - TAPSE (>1.6): 1.9 CM
BH CV ECHO MEAS - TR MAX VEL: 300 CM/SEC
BH CV ECHO MEASUREMENTS AVERAGE E/E' RATIO: 12.94
BH CV XLRA - RV BASE: 2.7 CM
BH CV XLRA - RV LENGTH: 5.7 CM
BH CV XLRA - RV MID: 1.7 CM
BH CV XLRA - TDI S': 14 CM/SEC
LEFT ATRIUM VOLUME INDEX: 26 ML/M2
MAXIMAL PREDICTED HEART RATE: 135 BPM
SINUS: 2.6 CM
STJ: 2.7 CM
STRESS TARGET HR: 115 BPM

## 2022-01-13 ENCOUNTER — APPOINTMENT (OUTPATIENT)
Dept: WOMENS IMAGING | Facility: HOSPITAL | Age: 87
End: 2022-01-13

## 2022-01-13 ENCOUNTER — OFFICE VISIT (OUTPATIENT)
Dept: INTERNAL MEDICINE | Facility: CLINIC | Age: 87
End: 2022-01-13

## 2022-01-13 VITALS
SYSTOLIC BLOOD PRESSURE: 128 MMHG | TEMPERATURE: 98 F | BODY MASS INDEX: 24.1 KG/M2 | HEART RATE: 64 BPM | OXYGEN SATURATION: 96 % | DIASTOLIC BLOOD PRESSURE: 80 MMHG | HEIGHT: 63 IN | WEIGHT: 136 LBS
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
## 2022-01-13 DIAGNOSIS — M25.552 LEFT HIP PAIN: Primary | ICD-10-CM

## 2022-01-13 PROCEDURE — 99213 OFFICE O/P EST LOW 20 MIN: CPT | Performed by: NURSE PRACTITIONER

## 2022-01-13 PROCEDURE — 72110 X-RAY EXAM L-2 SPINE 4/>VWS: CPT | Performed by: NURSE PRACTITIONER

## 2022-01-13 PROCEDURE — 72110 X-RAY EXAM L-2 SPINE 4/>VWS: CPT | Performed by: RADIOLOGY

## 2022-01-13 PROCEDURE — 72220 X-RAY EXAM SACRUM TAILBONE: CPT | Performed by: RADIOLOGY

## 2022-01-13 PROCEDURE — 73502 X-RAY EXAM HIP UNI 2-3 VIEWS: CPT | Performed by: NURSE PRACTITIONER

## 2022-01-13 PROCEDURE — 72220 X-RAY EXAM SACRUM TAILBONE: CPT | Performed by: NURSE PRACTITIONER

## 2022-01-13 PROCEDURE — 73502 X-RAY EXAM HIP UNI 2-3 VIEWS: CPT | Performed by: RADIOLOGY

## 2022-01-13 NOTE — PROGRESS NOTES
"Chief Complaint  Back Pain and Pain (Pt left hip.)    Subjective          Eliane Rao presents to [facility]  History of Present Illness  This is a 85 y/o female presenting to office with complaints of back pain and left hip pain. Patient reports recent fall in bathroom at home 1 month ago. Patient reports hitting back on side of bath tub. Patient reports she was experiencing pain in sacral region. Patient reports pain improved but then moved to left hip. Patient reports taking aleve as needed for pain. Patient reports she also has a scab to her right shin area that is slowly healing. Patient denies any loss of bowel or bladder. Patient denies any saddle anesthesia.       Objective   Vital Signs:   /80   Pulse 64   Temp 98 °F (36.7 °C) (Temporal)   Ht 160 cm (62.99\")   Wt 61.7 kg (136 lb)   SpO2 96%   BMI 24.10 kg/m²     Physical Exam  Vitals and nursing note reviewed.   Constitutional:       Appearance: Normal appearance.   HENT:      Head: Normocephalic and atraumatic.   Eyes:      Extraocular Movements: Extraocular movements intact.      Conjunctiva/sclera: Conjunctivae normal.      Pupils: Pupils are equal, round, and reactive to light.   Cardiovascular:      Rate and Rhythm: Normal rate and regular rhythm.      Pulses: Normal pulses.      Heart sounds: Normal heart sounds. No murmur heard.  No friction rub. No gallop.    Pulmonary:      Effort: Pulmonary effort is normal. No respiratory distress.      Breath sounds: Normal breath sounds. No stridor. No wheezing, rhonchi or rales.   Abdominal:      General: There is no distension.      Palpations: Abdomen is soft.      Tenderness: There is no abdominal tenderness.   Musculoskeletal:         General: Tenderness present.      Cervical back: Normal range of motion and neck supple.      Lumbar back: Spasms and tenderness present. Decreased range of motion.        Back:    Skin:     General: Skin is warm and dry.      " Capillary Refill: Capillary refill takes less than 2 seconds.   Neurological:      General: No focal deficit present.      Mental Status: She is alert and oriented to person, place, and time. Mental status is at baseline.      Motor: No weakness.   Psychiatric:         Mood and Affect: Mood normal.         Behavior: Behavior normal.         Thought Content: Thought content normal.         Judgment: Judgment normal.        Result Review :   The following data was reviewed by: FEMI Taylor on 01/13/2022:  Common labs    Common Labsle 3/19/21 3/19/21 3/19/21 8/9/21 8/9/21    0940 0940 0940 0937 0937   Glucose 95   102 (A)    BUN 21   20    Creatinine 0.97   0.98    eGFR Non African Am 55 (A)   54 (A)    eGFR African Am 66   66    Sodium 140   141    Potassium 4.5   4.5    Chloride 103   105    Calcium 9.3   9.5    Total Protein 6.4   6.3    Albumin 4.10   4.30    Total Bilirubin 1.0   0.4    Alkaline Phosphatase 69   72    AST (SGOT) 18   22    ALT (SGPT) 13   14    WBC   5.29     Hemoglobin   13.0     Hematocrit   39.6     Platelets   238     Total Cholesterol  165   167   Triglycerides  75   81   HDL Cholesterol  71 (A)   63 (A)   LDL Cholesterol   80   89   (A) Abnormal value       Comments are available for some flowsheets but are not being displayed.                    Assessment and Plan    Diagnoses and all orders for this visit:    1. Left hip pain (Primary)  Assessment & Plan:  Voltaren gel as needed.   XR's ordered.   Fall safety discussed.   Will wait for xr results-- more than likely recommend PT.   Aleve as needed for pain.   Ice/heat as needed.     Orders:  -     XR Hip With or Without Pelvis 2 - 3 View Left  -     XR Sacrum & Coccyx (In Office)  -     XR Spine Lumbar 4+ View (In Office)    2. Fall, initial encounter  Assessment & Plan:  Discussed fall safety with patient.         Follow Up   Return if symptoms worsen or fail to improve.  Patient was given instructions and counseling regarding her  condition or for health maintenance advice. Please see specific information pulled into the AVS if appropriate.

## 2022-01-13 NOTE — ASSESSMENT & PLAN NOTE
Voltaren gel as needed.   XR's ordered.   Fall safety discussed.   Will wait for xr results-- more than likely recommend PT.   Aleve as needed for pain.   Ice/heat as needed.

## 2022-01-17 DIAGNOSIS — M25.552 LEFT HIP PAIN: Primary | ICD-10-CM

## 2022-01-17 NOTE — PROGRESS NOTES
Please let patient know her XR's showed some lumbar scoliosis with some both hip and lumbar degenerative changes. I will refer to physical therapy.

## 2022-02-07 ENCOUNTER — TELEPHONE (OUTPATIENT)
Dept: INTERNAL MEDICINE | Facility: CLINIC | Age: 87
End: 2022-02-07

## 2022-02-07 DIAGNOSIS — E78.00 HYPERCHOLESTEROLEMIA: ICD-10-CM

## 2022-02-07 RX ORDER — LOVASTATIN 40 MG/1
40 TABLET ORAL NIGHTLY
Qty: 90 TABLET | Refills: 1 | Status: SHIPPED | OUTPATIENT
Start: 2022-02-07 | End: 2022-05-31

## 2022-02-07 RX ORDER — MONTELUKAST SODIUM 10 MG/1
10 TABLET ORAL NIGHTLY
Qty: 90 TABLET | Refills: 1 | Status: SHIPPED | OUTPATIENT
Start: 2022-02-07

## 2022-02-07 NOTE — TELEPHONE ENCOUNTER
Caller: Maira Eliane LOYDA    Relationship: Self    Best call back number: [phone]    Requested Prescriptions:   Requested Prescriptions     Pending Prescriptions Disp Refills   • lovastatin (MEVACOR) 40 MG tablet 90 tablet 1     Sig: Take 1 tablet by mouth Every Night.   • montelukast (SINGULAIR) 10 MG tablet       Sig: Take 1 tablet by mouth Every Night.        Pharmacy where request should be sent: [address] - [phone]  - [phone] FX     Additional details provided by patient:PATIENT IS OUT. SHE SAID HER PHARMACY HAS BEEN TRYING TO GET AN APPROVAL FOR OVER A WEEK. PLEASE CALL PATIENT AND ADVISE  Does the patient have less than a 3 day supply:  [x] Yes  [] No    Patrick Alanis Rep   02/07/22 09:31 EST

## 2022-02-18 ENCOUNTER — OFFICE VISIT (OUTPATIENT)
Dept: INTERNAL MEDICINE | Facility: CLINIC | Age: 87
End: 2022-02-18

## 2022-02-18 VITALS
SYSTOLIC BLOOD PRESSURE: 115 MMHG | WEIGHT: 134 LBS | DIASTOLIC BLOOD PRESSURE: 80 MMHG | HEART RATE: 70 BPM | TEMPERATURE: 98 F | BODY MASS INDEX: 23.74 KG/M2 | HEIGHT: 63 IN | OXYGEN SATURATION: 92 %

## 2022-02-18 DIAGNOSIS — E78.00 HYPERCHOLESTEROLEMIA: ICD-10-CM

## 2022-02-18 DIAGNOSIS — T78.40XS ALLERGY, SEQUELA: Primary | ICD-10-CM

## 2022-02-18 DIAGNOSIS — R35.0 URINARY FREQUENCY: ICD-10-CM

## 2022-02-18 DIAGNOSIS — I10 PRIMARY HYPERTENSION: ICD-10-CM

## 2022-02-18 PROBLEM — T78.40XA ALLERGY: Status: ACTIVE | Noted: 2022-02-18

## 2022-02-18 PROBLEM — Z87.891 FORMER SMOKER: Status: ACTIVE | Noted: 2022-02-18

## 2022-02-18 LAB
BACTERIA UR QL AUTO: ABNORMAL /HPF
BILIRUB UR QL STRIP: NEGATIVE
CLARITY UR: ABNORMAL
COLOR UR: YELLOW
GLUCOSE UR STRIP-MCNC: NEGATIVE MG/DL
HGB UR QL STRIP.AUTO: NEGATIVE
HYALINE CASTS UR QL AUTO: ABNORMAL /LPF
KETONES UR QL STRIP: NEGATIVE
LEUKOCYTE ESTERASE UR QL STRIP.AUTO: ABNORMAL
MUCOUS THREADS URNS QL MICRO: ABNORMAL /HPF
NITRITE UR QL STRIP: POSITIVE
PH UR STRIP.AUTO: 5.5 [PH] (ref 5–8)
PROT UR QL STRIP: NEGATIVE
RBC # UR STRIP: ABNORMAL /HPF
REF LAB TEST METHOD: ABNORMAL
SP GR UR STRIP: 1.02 (ref 1–1.03)
SQUAMOUS #/AREA URNS HPF: ABNORMAL /HPF
STARCH GRANULES URNS QL MICRO: ABNORMAL /HPF
UROBILINOGEN UR QL STRIP: ABNORMAL
WBC # UR STRIP: ABNORMAL /HPF

## 2022-02-18 PROCEDURE — 81001 URINALYSIS AUTO W/SCOPE: CPT | Performed by: FAMILY MEDICINE

## 2022-02-18 PROCEDURE — 99214 OFFICE O/P EST MOD 30 MIN: CPT | Performed by: FAMILY MEDICINE

## 2022-02-18 NOTE — ASSESSMENT & PLAN NOTE
Hypertension is chronic, stable.  Continue current treatment regimen.  Dietary sodium restriction.  Continue current medications.  Ambulatory blood pressure monitoring.  Blood pressure will be reassessed at the next regular appointment.  check CMP today

## 2022-02-18 NOTE — PROGRESS NOTES
"Chief Complaint  Establish Care and Med Refill    Subjective    History of Present Illness {CC  Problem List  Visit  Diagnosis   Encounters  Notes  Medications  Labs  Result Review Imaging  Media :23}     Eliane Rao presents to [facility] for   History of Present Illness   85 yo female present to establish care.  She is new to me, previously seeing Dr. Mcgrath.  She has history of hypertension, hyperlipidemia, IBS, and chronic kidney disease.    Blood pressure controlled on current medication. Losartan was added last November by cardiology. She does not check at home. Feeling well. No lightheadedness or dizziness.      Social History     Socioeconomic History   • Marital status:    Tobacco Use   • Smoking status: Former Smoker     Packs/day: 0.15     Years: 10.00     Pack years: 1.50     Types: Cigarettes     Quit date:      Years since quittin.1   • Smokeless tobacco: Never Used   Vaping Use   • Vaping Use: Never used   Substance and Sexual Activity   • Alcohol use: Yes     Comment: Occasional/caffeine use   • Drug use: No   • Sexual activity: Defer       Objective     Vital Signs:   /80   Pulse 70   Temp 98 °F (36.7 °C) (Temporal)   Ht 160 cm (62.99\")   Wt 60.8 kg (134 lb)   SpO2 92%   BMI 23.74 kg/m²   Physical Exam  Constitutional:       General: She is not in acute distress.     Appearance: She is not ill-appearing.   HENT:      Head: Normocephalic.   Eyes:      Conjunctiva/sclera: Conjunctivae normal.   Cardiovascular:      Rate and Rhythm: Regular rhythm.      Heart sounds: Normal heart sounds.   Pulmonary:      Effort: No respiratory distress.      Breath sounds: Normal breath sounds. No wheezing.   Musculoskeletal:      Left lower leg: No edema.   Neurological:      Mental Status: She is alert.   Psychiatric:         Mood and Affect: Mood normal.         Thought Content: Thought content normal.         Judgment: Judgment normal.      "   Result Review  Data Reviewed:{ Labs  Result Review  Imaging  Med Tab  Media :23}   The following data was reviewed by: Wen Jackson MD on 02/18/2022  Lab Results - Last 18 Months   Lab Units 08/09/21  0937 03/19/21  0940 11/17/20  1112   BUN mg/dL 20 21 18   CREATININE mg/dL 0.98 0.97 0.89   EGFR IF NONAFRICN AM mL/min/1.73 54* 55* 60*   EGFR IF AFRICN AM mL/min/1.73 66 66 73   SODIUM mmol/L 141 140 139   POTASSIUM mmol/L 4.5 4.5 4.5   CHLORIDE mmol/L 105 103 102   CALCIUM mg/dL 9.5 9.3 9.3   ALBUMIN g/dL 4.30 4.10 4.20   BILIRUBIN mg/dL 0.4 1.0 0.8   ALK PHOS U/L 72 69 74   AST (SGOT) U/L 22 18 17   ALT (SGPT) U/L 14 13 15   CHOLESTEROL mg/dL 167 165 164   TRIGLYCERIDES mg/dL 81 75 76   HDL CHOL mg/dL 63* 71* 77*   VLDL CHOLESTEROL RHONDA mg/dL 15 14 14   LDL CHOL mg/dL 89 80 73   WBC 10*3/mm3  --  5.29  --    RBC 10*6/mm3  --  4.23  --    HEMATOCRIT %  --  39.6  --    MCV fL  --  93.6  --    MCH pg  --  30.7  --             Current Outpatient Medications:   •  amLODIPine (NORVASC) 5 MG tablet, Take 5 mg by mouth Daily., Disp: , Rfl:   •  aspirin 81 MG EC tablet, Take 81 mg by mouth 3 (Three) Times a Week., Disp: , Rfl:   •  Calcium Carb-Cholecalciferol (CALTRATE 600+D) 600-800 MG-UNIT tablet, Take  by mouth., Disp: , Rfl:   •  Lactobacillus Rhamnosus, GG, (CULTURELLE PO), Take  by mouth Daily., Disp: , Rfl:   •  losartan (COZAAR) 25 MG tablet, Take 1 tablet by mouth Daily., Disp: 90 tablet, Rfl: 3  •  lovastatin (MEVACOR) 40 MG tablet, Take 1 tablet by mouth Every Night., Disp: 90 tablet, Rfl: 1  •  metoprolol tartrate (LOPRESSOR) 100 MG tablet, TAKE ONE TABLET BY MOUTH TWICE A DAY, Disp: 180 tablet, Rfl: 1  •  montelukast (SINGULAIR) 10 MG tablet, Take 1 tablet by mouth Every Night., Disp: 90 tablet, Rfl: 1     Assessment and Plan {CC Problem List  Visit Diagnosis  ROS  Review (Popup)  Health Maintenance  Quality  BestPractice  Medications  SmartSets  SnapShot Encounters  Media :23}   Problem  List Items Addressed This Visit        Allergies and Adverse Reactions    Allergy - Primary    Current Assessment & Plan     Following with Allergist , no allergy shots at this time. Had them as a child.   Taking montelukast daily as prescribed for the last few years.             Cardiac and Vasculature    Hypercholesterolemia (Chronic)    Current Assessment & Plan     Lipid abnormalities are chronic, stable.  Lipid panel today, continue current medication.   Lipids will be reassessed in 6 months.         Relevant Orders    Lipid panel    Hypertension (Chronic)    Current Assessment & Plan     Hypertension is chronic, stable.  Continue current treatment regimen.  Dietary sodium restriction.  Continue current medications.  Ambulatory blood pressure monitoring.  Blood pressure will be reassessed at the next regular appointment.  check CMP today          Relevant Orders    Comprehensive metabolic panel      Other Visit Diagnoses     Urinary frequency        Relevant Orders    Urinalysis With Culture If Indicated - Urine, Clean Catch    Urinalysis, Microscopic Only - Urine, Clean Catch          Follow Up   Return in about 6 months (around 8/18/2022) for Medicare Wellness.  Patient was given instructions and counseling regarding her condition or for health maintenance advice. Please see specific information pulled into the AVS if appropriate.    EpicAct:[email or web address]_ORDERS,RunParams:STARTUPTYPE=FOLLOW    [email or web address]

## 2022-02-18 NOTE — ASSESSMENT & PLAN NOTE
Lipid abnormalities are chronic, stable.  Lipid panel today, continue current medication.   Lipids will be reassessed in 6 months.

## 2022-02-18 NOTE — ASSESSMENT & PLAN NOTE
Following with Allergist , no allergy shots at this time. Had them as a child.   Taking montelukast daily as prescribed for the last few years.

## 2022-02-19 LAB
ALBUMIN SERPL-MCNC: 4.5 G/DL (ref 3.6–4.6)
ALBUMIN/GLOB SERPL: 1.8 {RATIO} (ref 1.2–2.2)
ALP SERPL-CCNC: 88 IU/L (ref 44–121)
ALT SERPL-CCNC: 13 IU/L (ref 0–32)
AST SERPL-CCNC: 22 IU/L (ref 0–40)
BILIRUB SERPL-MCNC: 0.6 MG/DL (ref 0–1.2)
BUN SERPL-MCNC: 25 MG/DL (ref 8–27)
BUN/CREAT SERPL: 24 (ref 12–28)
CALCIUM SERPL-MCNC: 9.4 MG/DL (ref 8.7–10.3)
CHLORIDE SERPL-SCNC: 105 MMOL/L (ref 96–106)
CHOLEST SERPL-MCNC: 170 MG/DL (ref 100–199)
CO2 SERPL-SCNC: 23 MMOL/L (ref 20–29)
CREAT SERPL-MCNC: 1.05 MG/DL (ref 0.57–1)
GLOBULIN SER CALC-MCNC: 2.5 G/DL (ref 1.5–4.5)
GLUCOSE SERPL-MCNC: 97 MG/DL (ref 65–99)
HDLC SERPL-MCNC: 74 MG/DL
LDLC SERPL CALC-MCNC: 85 MG/DL (ref 0–99)
POTASSIUM SERPL-SCNC: 4.7 MMOL/L (ref 3.5–5.2)
PROT SERPL-MCNC: 7 G/DL (ref 6–8.5)
SODIUM SERPL-SCNC: 141 MMOL/L (ref 134–144)
TRIGL SERPL-MCNC: 52 MG/DL (ref 0–149)
VLDLC SERPL CALC-MCNC: 11 MG/DL (ref 5–40)

## 2022-02-22 LAB
BACTERIA UR CULT: ABNORMAL
BACTERIA UR CULT: ABNORMAL
OTHER ANTIBIOTIC SUSC ISLT: ABNORMAL

## 2022-02-22 RX ORDER — NITROFURANTOIN 25; 75 MG/1; MG/1
100 CAPSULE ORAL 2 TIMES DAILY
Qty: 10 CAPSULE | Refills: 0 | Status: SHIPPED | OUTPATIENT
Start: 2022-02-22 | End: 2022-04-26

## 2022-02-23 LAB — REF LAB TEST METHOD: NORMAL

## 2022-03-29 RX ORDER — METOPROLOL TARTRATE 100 MG/1
100 TABLET ORAL 2 TIMES DAILY
Qty: 180 TABLET | Refills: 3 | Status: SHIPPED | OUTPATIENT
Start: 2022-03-29 | End: 2022-09-01 | Stop reason: SDUPTHER

## 2022-04-04 RX ORDER — AMLODIPINE BESYLATE 5 MG/1
TABLET ORAL
Qty: 180 TABLET | Refills: 1 | Status: SHIPPED | OUTPATIENT
Start: 2022-04-04

## 2022-04-07 ENCOUNTER — TELEPHONE (OUTPATIENT)
Dept: INTERNAL MEDICINE | Facility: CLINIC | Age: 87
End: 2022-04-07

## 2022-04-07 DIAGNOSIS — T75.3XXS MOTION SICKNESS, SEQUELA: Primary | ICD-10-CM

## 2022-04-07 RX ORDER — SCOLOPAMINE TRANSDERMAL SYSTEM 1 MG/1
1 PATCH, EXTENDED RELEASE TRANSDERMAL
Qty: 4 EACH | Refills: 0 | Status: SHIPPED | OUTPATIENT
Start: 2022-04-07 | End: 2022-05-31

## 2022-04-07 NOTE — TELEPHONE ENCOUNTER
Caller: Eliane Rao    Relationship: Self    Best call back number: [phone]    What medication are you requesting: PATCH FOR MOTION SICKNESS    Have you had these symptoms before:    [x] Yes  [] No    Have you been treated for these symptoms before:   [x] Yes  [] No    If a prescription is needed, what is your preferred pharmacy and phone number: [facility] [address] [place] [address] AT [facility] [address] - [phone]  - [phone] FX     Additional notes: PATIENT IS GOING OUT OF TOWN AND NEEDS THIS FOR HER CRUISE. PLEASE ADVISE.

## 2022-04-26 ENCOUNTER — OFFICE VISIT (OUTPATIENT)
Dept: INTERNAL MEDICINE | Facility: CLINIC | Age: 87
End: 2022-04-26

## 2022-04-26 VITALS
SYSTOLIC BLOOD PRESSURE: 134 MMHG | BODY MASS INDEX: 24.03 KG/M2 | OXYGEN SATURATION: 96 % | HEART RATE: 64 BPM | DIASTOLIC BLOOD PRESSURE: 76 MMHG | TEMPERATURE: 98 F | WEIGHT: 130.6 LBS | HEIGHT: 62 IN

## 2022-04-26 DIAGNOSIS — J40 BRONCHITIS: ICD-10-CM

## 2022-04-26 DIAGNOSIS — R05.9 COUGH: ICD-10-CM

## 2022-04-26 DIAGNOSIS — Z87.891 FORMER SMOKER: Primary | ICD-10-CM

## 2022-04-26 PROCEDURE — 99213 OFFICE O/P EST LOW 20 MIN: CPT | Performed by: NURSE PRACTITIONER

## 2022-04-26 RX ORDER — BENZONATATE 100 MG/1
100 CAPSULE ORAL 3 TIMES DAILY PRN
Qty: 30 CAPSULE | Refills: 0 | Status: SHIPPED | OUTPATIENT
Start: 2022-04-26 | End: 2022-06-01 | Stop reason: ALTCHOICE

## 2022-04-26 RX ORDER — AZITHROMYCIN 250 MG/1
TABLET, FILM COATED ORAL
Qty: 6 TABLET | Refills: 0 | Status: SHIPPED | OUTPATIENT
Start: 2022-04-26 | End: 2022-05-31

## 2022-04-26 NOTE — PATIENT INSTRUCTIONS
Honey and Lemon Tea for cough  1 Tbsp lemon juice   2 Tbsp honey   1/2 cup or more of hot water  Directions:   Put honey and lemon juice into a tea cup or mug. Add hot water and stir. Add more lemon juice, honey, or hot water to taste.  Sip on this and have two or three per day while cough is present.     Not for children less than 2 years of age.   Caution if diabetic.

## 2022-04-26 NOTE — PROGRESS NOTES
Chief Complaint  Cough and URI (Since Saturday )     Subjective:      History of Present Illness {CC  Problem List  Visit  Diagnosis   Encounters  Notes  Medications  Labs  Result Review Imaging  Media :23}     Eliane Rao is a patient of Wen Jackson MD and presents to [facility] for     URI   This is a new problem. The current episode started in the past 7 days. The problem has been gradually worsening. There has been no fever. Associated symptoms include congestion and coughing. Pertinent negatives include no headaches, rhinorrhea, sinus pain, sneezing, sore throat or wheezing.   hx smoking.   Cough - NP - coughing so much chest tender.      No known ill contacts.     I have reviewed patient's medical history, any new submitted information provided by patient or medical assistant and updated medical record.      Objective:      Physical Exam  Vitals reviewed.   Constitutional:       Appearance: Normal appearance. She is well-developed.   HENT:      Head:      Comments: Wearing mask due to COVID   Neck:      Thyroid: No thyromegaly.   Cardiovascular:      Rate and Rhythm: Normal rate and regular rhythm.      Pulses: Normal pulses.      Heart sounds: Normal heart sounds.   Pulmonary:      Effort: Pulmonary effort is normal.      Breath sounds: Normal breath sounds.      Comments: E/U   NO C/W + tussive fremitus   Musculoskeletal:      Cervical back: Normal range of motion and neck supple.   Lymphadenopathy:      Cervical: No cervical adenopathy.   Skin:     General: Skin is dry.   Neurological:      Mental Status: She is alert and oriented to person, place, and time.   Psychiatric:         Mood and Affect: Mood normal.         Behavior: Behavior normal. Behavior is cooperative.         Thought Content: Thought content normal.         Judgment: Judgment normal.        Result Review  Data Reviewed:{ Labs  Result Review  Imaging  Med Tab  Media :23}  "               Vital Signs:   /76 (BP Location: Right arm, Patient Position: Sitting, Cuff Size: Adult)   Pulse 64   Temp 98 °F (36.7 °C) (Temporal)   Ht 157.5 cm (62\")   Wt 59.2 kg (130 lb 9.6 oz)   SpO2 96%   BMI 23.89 kg/m²         Requested Prescriptions     Signed Prescriptions Disp Refills   • azithromycin (ZITHROMAX) 250 MG tablet 6 tablet 0     Sig: Take 2 tablets the first day, then 1 tablet daily for 4 days.   • benzonatate (Tessalon Perles) 100 MG capsule 30 capsule 0     Sig: Take 1 capsule by mouth 3 (Three) Times a Day As Needed for Cough.       Routine medications provided by this office will also be refilled via pharmacy request.       Current Outpatient Medications:   •  amLODIPine (NORVASC) 5 MG tablet, TAKE ONE TABLET BY MOUTH TWICE A DAY, Disp: 180 tablet, Rfl: 1  •  aspirin 81 MG EC tablet, Take 81 mg by mouth 3 (Three) Times a Week., Disp: , Rfl:   •  calcium carb-cholecalciferol 600-800 MG-UNIT tablet, Take  by mouth., Disp: , Rfl:   •  Lactobacillus Rhamnosus, GG, (CULTURELLE PO), Take  by mouth Daily., Disp: , Rfl:   •  losartan (COZAAR) 25 MG tablet, Take 1 tablet by mouth Daily., Disp: 90 tablet, Rfl: 3  •  lovastatin (MEVACOR) 40 MG tablet, Take 1 tablet by mouth Every Night., Disp: 90 tablet, Rfl: 1  •  metoprolol tartrate (LOPRESSOR) 100 MG tablet, Take 1 tablet by mouth 2 (Two) Times a Day., Disp: 180 tablet, Rfl: 3  •  montelukast (SINGULAIR) 10 MG tablet, Take 1 tablet by mouth Every Night., Disp: 90 tablet, Rfl: 1  •  Scopolamine (Transderm-Scop, 1.5 MG,) 1 MG/3DAYS patch, Place 1 patch on the skin as directed by provider Every 72 (Seventy-Two) Hours., Disp: 4 each, Rfl: 0  •  azithromycin (ZITHROMAX) 250 MG tablet, Take 2 tablets the first day, then 1 tablet daily for 4 days., Disp: 6 tablet, Rfl: 0  •  benzonatate (Tessalon Perles) 100 MG capsule, Take 1 capsule by mouth 3 (Three) Times a Day As Needed for Cough., Disp: 30 capsule, Rfl: 0     Assessment and Plan:    "   Assessment and Plan {CC Problem List  Visit Diagnosis  ROS  Review (Popup)  Health Maintenance  Quality  BestPractice  Medications  SmartSets  SnapShot Encounters  Media :23}     Problem List Items Addressed This Visit        Tobacco    Former smoker - Primary      Other Visit Diagnoses     Bronchitis        Relevant Medications    azithromycin (ZITHROMAX) 250 MG tablet    benzonatate (Tessalon Perles) 100 MG capsule    Cough        Relevant Medications    benzonatate (Tessalon Perles) 100 MG capsule          Follow Up {Instructions Charge Capture  Follow-up Communications :23}     Return if symptoms worsen or fail to improve.    Follow up with PCP as scheduled.     Patient was given instructions and counseling regarding her condition or for health maintenance advice. Please see specific information pulled into the AVS if appropriate.    Dragon disclaimer:   Much of this encounter note is an electronic transcription/translation of spoken language to printed text. The electronic translation of spoken language may permit erroneous, or at times, nonsensical words or phrases to be inadvertently transcribed; Although I have reviewed the note for such errors, some may still exist.     Additional Patient Counseling:       Patient Instructions   Honey and Lemon Tea for cough  • 1 Tbsp lemon juice   • 2 Tbsp honey   • 1/2 cup or more of hot water  Directions:   Put honey and lemon juice into a tea cup or mug. Add hot water and stir. Add more lemon juice, honey, or hot water to taste.  Sip on this and have two or three per day while cough is present.     Not for children less than 2 years of age.   Caution if diabetic.

## 2022-05-24 ENCOUNTER — TELEPHONE (OUTPATIENT)
Dept: INTERNAL MEDICINE | Facility: CLINIC | Age: 87
End: 2022-05-24

## 2022-05-24 NOTE — TELEPHONE ENCOUNTER
UNABLE TO WARM TRANSFER    Caller: Eliane Rao    Relationship to patient: Self    Best call back number: [phone]    Patient is needing: PATIENT IS NEEDING A HOSPITAL FU WITH DR. NOBLE. SHE WAS DISCHARGED FROM [facility] 5/23 FOR BLOOD CLOTS. PLEASE CALL.

## 2022-05-27 ENCOUNTER — TELEPHONE (OUTPATIENT)
Dept: CARDIOLOGY | Facility: CLINIC | Age: 87
End: 2022-05-27

## 2022-05-27 NOTE — TELEPHONE ENCOUNTER
Pt was recently in the hospital for 3 days. She had two blood clots in her leg and 1 blood clot in her lung. They put her on Eliquis 5 mg BID. She was seen at [place].     Do she need to seen you sooner than 11/02/22?    She can be reached [phone]    Thanks

## 2022-05-31 ENCOUNTER — OFFICE VISIT (OUTPATIENT)
Dept: INTERNAL MEDICINE | Facility: CLINIC | Age: 87
End: 2022-05-31

## 2022-05-31 VITALS
HEART RATE: 71 BPM | HEIGHT: 63 IN | DIASTOLIC BLOOD PRESSURE: 80 MMHG | BODY MASS INDEX: 22.32 KG/M2 | SYSTOLIC BLOOD PRESSURE: 126 MMHG | OXYGEN SATURATION: 93 % | WEIGHT: 126 LBS

## 2022-05-31 DIAGNOSIS — R35.0 INCREASED URINARY FREQUENCY: Primary | ICD-10-CM

## 2022-05-31 DIAGNOSIS — R91.1 LUNG NODULE SEEN ON IMAGING STUDY: ICD-10-CM

## 2022-05-31 DIAGNOSIS — Q21.12 PFO (PATENT FORAMEN OVALE): ICD-10-CM

## 2022-05-31 DIAGNOSIS — I26.99 PULMONARY EMBOLISM, UNSPECIFIED CHRONICITY, UNSPECIFIED PULMONARY EMBOLISM TYPE, UNSPECIFIED WHETHER ACUTE COR PULMONALE PRESENT: ICD-10-CM

## 2022-05-31 LAB
BILIRUB UR QL CFM: NEGATIVE
BILIRUB UR QL STRIP: ABNORMAL
CLARITY UR: CLEAR
COLOR UR: YELLOW
GLUCOSE UR STRIP-MCNC: NEGATIVE MG/DL
HGB UR QL STRIP.AUTO: NEGATIVE
KETONES UR QL STRIP: ABNORMAL
LEUKOCYTE ESTERASE UR QL STRIP.AUTO: NEGATIVE
NITRITE UR QL STRIP: NEGATIVE
PH UR STRIP.AUTO: 5.5 [PH] (ref 5–8)
PROT UR QL STRIP: ABNORMAL
SP GR UR STRIP: 1.02 (ref 1–1.03)
UROBILINOGEN UR QL STRIP: ABNORMAL

## 2022-05-31 PROCEDURE — 99213 OFFICE O/P EST LOW 20 MIN: CPT | Performed by: FAMILY MEDICINE

## 2022-05-31 PROCEDURE — 81003 URINALYSIS AUTO W/O SCOPE: CPT | Performed by: FAMILY MEDICINE

## 2022-05-31 RX ORDER — ASPIRIN 81 MG/1
81 TABLET, CHEWABLE ORAL 2 TIMES DAILY
COMMUNITY
End: 2022-06-01 | Stop reason: SDUPTHER

## 2022-05-31 NOTE — PROGRESS NOTES
"    Chief Complaint  Hospital Follow Up Visit and Pulmonary Embolism (And DVT)    Subjective    History of Present Illness {CC  Problem List  Visit  Diagnosis   Encounters  Notes  Medications  Labs  Result Review Imaging  Media :23}     Eliane Rao presents to [facility] for   History of Present Illness     85 yo female present for hospital follow up visit. She had several DVT in L leg and PE.  States she had return from 9 hour treat to [place].  She was having increase shortness of breath and Lower extremity swelling. She is present with her son Abner.  He states his daughter diagnosed with Factor V leiden years ago. She is taking eliquis now.   She scheduled to see cardiology tomorrow, to follow up on PFO. No longer requiring oxygent taken off prior to discharge. No issues with shortness of breath walking around the house. She is still fatigue.     Increase frequency, does not completely empty bladder with occasional discomfort.      Social History     Socioeconomic History   • Marital status:    Tobacco Use   • Smoking status: Former Smoker     Packs/day: 0.15     Years: 10.00     Pack years: 1.50     Types: Cigarettes     Quit date: 1990     Years since quittin.4   • Smokeless tobacco: Never Used   Vaping Use   • Vaping Use: Never used   Substance and Sexual Activity   • Alcohol use: Yes     Alcohol/week: 6.0 standard drinks     Types: 6 Glasses of wine per week     Comment: Occasional/caffeine use   • Drug use: No   • Sexual activity: Not Currently     Partners: Male     Birth control/protection: Surgical      Objective     Vital Signs:   /80   Pulse 71   Ht 160 cm (63\")   Wt 57.2 kg (126 lb)   SpO2 93%   BMI 22.32 kg/m²   Physical Exam  Constitutional:       General: She is not in acute distress.     Appearance: She is not ill-appearing.   HENT:      Head: Normocephalic.   Cardiovascular:      Rate and Rhythm: Regular rhythm.      Heart " sounds: Normal heart sounds.   Pulmonary:      Effort: No respiratory distress.      Breath sounds: Normal breath sounds. No wheezing.   Musculoskeletal:      Right lower leg: No edema.      Left lower leg: No edema.      Comments: Has on compression stockings   Neurological:      Mental Status: She is alert.        Result Review  Data Reviewed:{ Labs  Result Review  Imaging  Med Tab  Media :23}   The following data was reviewed by: Wen Jackson MD on 05/31/2022  Lab Results - Last 18 Months   Lab Units 05/22/22  0259 05/21/22  1123 02/18/22  0943 08/09/21  0937 03/19/21  0940   BUN mg/dL  --   --  25 20 21   CREATININE mg/dL  --   --  1.05* 0.98 0.97   EGFR IF NONAFRICN AM mL/min/1.73  --   --  49* 54* 55*   EGFR IF AFRICN AM mL/min/1.73  --   --  56* 66 66   SODIUM mmol/L  --   --  141 141 140   POTASSIUM mmol/L  --   --  4.7 4.5 4.5   CHLORIDE mmol/L  --   --  105 105 103   CALCIUM mg/dL  --   --  9.4 9.5 9.3   ALBUMIN g/dL  --   --  4.5 4.30 4.10   BILIRUBIN mg/dL  --   --  0.6 0.4 1.0   ALK PHOS IU/L  --   --  88 72 69   AST (SGOT) IU/L  --   --  22 22 18   ALT (SGPT) IU/L  --   --  13 14 13   CHOLESTEROL mg/dL  --   --  170 167 165   TRIGLYCERIDES mg/dL  --   --  52 81 75   HDL CHOL mg/dL  --   --  74 63* 71*   VLDL CHOLESTEROL RHONDA mg/dL  --   --  11 15 14   LDL CHOL mg/dL  --   --  85 89 80   WBC 10*3/uL 6.58 7.01  --   --  5.29   RBC 10*6/uL 3.65* 4.10  --   --  4.23   HEMATOCRIT % 34.5* 38.8  --   --  39.6   MCV fL 94.5 94.6  --   --  93.6   MCH pg 30.4 30.7  --   --  30.7     Reviewed recent hospital notes and imaging.          Current Outpatient Medications:   •  amLODIPine (NORVASC) 5 MG tablet, TAKE ONE TABLET BY MOUTH TWICE A DAY, Disp: 180 tablet, Rfl: 1  •  apixaban (ELIQUIS) 5 MG tablet tablet, Take 5 mg by mouth Every 12 (Twelve) Hours., Disp: , Rfl:   •  aspirin 81 MG EC tablet, Take 81 mg by mouth 3 (Three) Times a Week., Disp: , Rfl:   •  calcium carb-cholecalciferol 600-800 MG-UNIT  tablet, Take  by mouth., Disp: , Rfl:   •  Lactobacillus Rhamnosus, GG, (CULTURELLE PO), Take  by mouth Daily., Disp: , Rfl:   •  losartan (COZAAR) 25 MG tablet, Take 1 tablet by mouth Daily., Disp: 90 tablet, Rfl: 3  •  metoprolol tartrate (LOPRESSOR) 100 MG tablet, Take 1 tablet by mouth 2 (Two) Times a Day., Disp: 180 tablet, Rfl: 3  •  montelukast (SINGULAIR) 10 MG tablet, Take 1 tablet by mouth Every Night., Disp: 90 tablet, Rfl: 1  •  aspirin 81 MG chewable tablet, Chew 81 mg 2 (Two) Times a Day., Disp: , Rfl:   •  benzonatate (Tessalon Perles) 100 MG capsule, Take 1 capsule by mouth 3 (Three) Times a Day As Needed for Cough., Disp: 30 capsule, Rfl: 0      Assessment and Plan {CC Problem List  Visit Diagnosis  ROS  Review (Popup)  Health Maintenance  Quality  BestPractice  Medications  SmartSets  SnapShot Encounters  Media :23}   Problem List Items Addressed This Visit        Cardiac and Vasculature    PFO (patent foramen ovale)    Current Assessment & Plan     Keep apt with cardiology tomorow              Coag and Thromboembolic    Pulmonary embolism (HCC)    Overview     Hospitalization May 2022 at [facility]  DVT and PE started on eliquis           Current Assessment & Plan     Continue eliquis as prescribed.    Will call for refill once she has reviewed best place to get medication filled.   Advise to monitor respiratory status, call if increase shortness of breath walking around her home  Will start slow with exercise  Discussed importance of decreasing fall risk now that she is taking blood thinner.    Remove area rugs and any other fall hazards.               Other Visit Diagnoses     Increased urinary frequency    -  Primary    Relevant Orders    Urinalysis With Culture If Indicated - Urine, Clean Catch (Completed)    Ictotest, Urine - Urine, Clean Catch (Completed)    Lung nodule seen on imaging study        Relevant Orders    CT Chest With Contrast        Check urine for UTI.    Advise  increase water intake         Follow Up   Return if symptoms worsen or fail to improve, for seek medical attention if worsening symtpoms increase shortness of breath .  Patient was given instructions and counseling regarding her condition or for health maintenance advice. Please see specific information pulled into the AVS if appropriate.    EpicAct:[email or web address]_AMB_ORDERS,RunParams:STARTUPTYPE=FOLLOW    [email or web address]_DISCHARGE

## 2022-05-31 NOTE — ASSESSMENT & PLAN NOTE
Continue eliquis as prescribed.    Will call for refill once she has reviewed best place to get medication filled.   Advise to monitor respiratory status, call if increase shortness of breath walking around her home  Will start slow with exercise  Discussed importance of decreasing fall risk now that she is taking blood thinner.    Remove area rugs and any other fall hazards.

## 2022-06-01 ENCOUNTER — OFFICE VISIT (OUTPATIENT)
Dept: CARDIOLOGY | Facility: CLINIC | Age: 87
End: 2022-06-01

## 2022-06-01 VITALS
SYSTOLIC BLOOD PRESSURE: 130 MMHG | WEIGHT: 125.4 LBS | BODY MASS INDEX: 22.22 KG/M2 | DIASTOLIC BLOOD PRESSURE: 60 MMHG | HEART RATE: 69 BPM | HEIGHT: 63 IN

## 2022-06-01 DIAGNOSIS — I10 PRIMARY HYPERTENSION: Chronic | ICD-10-CM

## 2022-06-01 DIAGNOSIS — Q21.12 PFO (PATENT FORAMEN OVALE): ICD-10-CM

## 2022-06-01 DIAGNOSIS — D68.51 FACTOR 5 LEIDEN MUTATION, HETEROZYGOUS: ICD-10-CM

## 2022-06-01 DIAGNOSIS — I27.20 PULMONARY HYPERTENSION: ICD-10-CM

## 2022-06-01 DIAGNOSIS — I26.99 ACUTE PULMONARY EMBOLISM WITHOUT ACUTE COR PULMONALE, UNSPECIFIED PULMONARY EMBOLISM TYPE: Primary | ICD-10-CM

## 2022-06-01 PROCEDURE — 99214 OFFICE O/P EST MOD 30 MIN: CPT | Performed by: INTERNAL MEDICINE

## 2022-06-01 PROCEDURE — 93000 ELECTROCARDIOGRAM COMPLETE: CPT | Performed by: INTERNAL MEDICINE

## 2022-06-01 NOTE — PROGRESS NOTES
[facility] Follow Up Office Note     Encounter Date:22  Patient:Eliane Rao  :1935  MRN:[id number]      Chief Complaint:   Chief Complaint   Patient presents with   • Hospital F/U       History of Presenting Illness:      Ms. Rao is a 86 y.o. woman with past medical history notable for patent foramen ovale, hypertension, mixed hyperlipidemia who presents to our office for scheduled follow up.  Since her last appointment unfortunately patient developed a pulmonary emboli.  She required hospitalization.  This was in the setting of a long trip and a cruise.  Unfortunately the patient was recently diagnosed with factor V Leiden and this is likely the etiology of her clot in addition to the long trip.  It is recommended that she is on lifelong anticoagulation since.  Her echocardiogram did show pulmonary hypertension but fortunately normal RV and LV size and function.  Her proBNP level and troponin were also normal.  Since her events she is still short of breath and having leg swelling but slowly improving      Review of Systems:  Review of Systems   Constitutional: Negative.   HENT: Negative.    Eyes: Negative.    Cardiovascular: Positive for dyspnea on exertion and leg swelling.   Respiratory: Positive for shortness of breath.    Endocrine: Negative.    Hematologic/Lymphatic: Negative.    Skin: Negative.    Musculoskeletal: Positive for joint pain and joint swelling.   Gastrointestinal: Negative.    Genitourinary: Positive for frequency.   Neurological: Negative.    Psychiatric/Behavioral: Negative.    Allergic/Immunologic: Negative.      Current Outpatient Medications on File Prior to Visit   Medication Sig Dispense Refill   • amLODIPine (NORVASC) 5 MG tablet TAKE ONE TABLET BY MOUTH TWICE A  tablet 1   • apixaban (ELIQUIS) 5 MG tablet tablet Take 5 mg by mouth Every 12 (Twelve) Hours.     • calcium carb-cholecalciferol 600-800 MG-UNIT tablet Take  by mouth.     •  Lactobacillus Rhamnosus, GG, (CULTURELLE PO) Take  by mouth Daily.     • losartan (COZAAR) 25 MG tablet Take 1 tablet by mouth Daily. 90 tablet 3   • metoprolol tartrate (LOPRESSOR) 100 MG tablet Take 1 tablet by mouth 2 (Two) Times a Day. 180 tablet 3   • montelukast (SINGULAIR) 10 MG tablet Take 1 tablet by mouth Every Night. 90 tablet 1   • [DISCONTINUED] aspirin 81 MG chewable tablet Chew 81 mg 2 (Two) Times a Day.     • [DISCONTINUED] aspirin 81 MG EC tablet Take 81 mg by mouth 3 (Three) Times a Week.     • [DISCONTINUED] benzonatate (Tessalon Perles) 100 MG capsule Take 1 capsule by mouth 3 (Three) Times a Day As Needed for Cough. 30 capsule 0     No current facility-administered medications on file prior to visit.        Allergies   Allergen Reactions   • Sulfa Antibiotics Nausea Only and GI Intolerance       Past Medical History:   Diagnosis Date   • Allergic Sulpha-10 yrs ago   • Arthritis 7 yrs ago   • Asthma    • Cataract 10 yrs ago   • Chest pain    • Chronic renal insufficiency    • Diastolic dysfunction     grade 1   • Diverticulosis    • Dyspnea    • Dysuria    • Hiatal hernia    • History of cataract    • Hyperlipidemia    • Hypertension    • IBS (irritable bowel syndrome)    • Insomnia    • Mitral regurgitation    • Multiple polyps of sigmoid colon    • Osteopenia    • Osteoporosis    • Patent foramen ovale    • Simple goiter    • Tricuspid regurgitation    • UTI (urinary tract infection)        Past Surgical History:   Procedure Laterality Date   • CATARACT EXTRACTION, BILATERAL     • COLONOSCOPY  2013   • TUBAL ABDOMINAL LIGATION         Social History     Socioeconomic History   • Marital status:    Tobacco Use   • Smoking status: Former Smoker     Packs/day: 0.15     Years: 10.00     Pack years: 1.50     Types: Cigarettes     Quit date: 1990     Years since quittin.4   • Smokeless tobacco: Never Used   Vaping Use   • Vaping Use: Never used   Substance and Sexual  "Activity   • Alcohol use: Yes     Alcohol/week: 6.0 standard drinks     Types: 6 Glasses of wine per week     Comment: Occasional/caffeine use   • Drug use: No   • Sexual activity: Not Currently     Partners: Male     Birth control/protection: Surgical       Family History   Problem Relation Age of Onset   • Cancer Mother    • Diabetes Other    • Cancer Maternal Aunt        The following portions of the patient's history were reviewed and updated as appropriate: allergies, current medications, past family history, past medical history, past social history, past surgical history and problem list.       Objective:       Vitals:    06/01/22 1224   BP: 130/60   BP Location: Right arm   Patient Position: Sitting   Pulse: 69   Weight: 56.9 kg (125 lb 6.4 oz)   Height: 160 cm (63\")     Body mass index is 22.21 kg/m².     Physical Exam:  Constitutional: Well appearing, well developed, no acute distress   HENT: Oropharynx clear and membrane moist  Eyes: Normal conjunctiva, no sclera icterus.  Neck: Supple, no carotid bruit bilaterally.  Cardiovascular: Regular rate and rhythm, No Murmur, Trace bilateral lower extremity edema.  Pulmonary: Normal respiratory effort, normal lung sounds, no wheezing.  Abdominal: Soft, nontender, no hepatosplenomegaly, liver is non-pulsatile.  Neurological: Alert and orient x 3.   Skin: Warm, dry, mild ecchymosis, no rash.  Psych: Appropriate mood and affect. Normal judgment and insight.      Lab Results   Component Value Date    GLUCOSE 97 02/18/2022    BUN 25 02/18/2022    CREATININE 1.05 (H) 02/18/2022    EGFRIFNONA 49 (L) 02/18/2022    EGFRIFAFRI 56 (L) 02/18/2022    BCR 24 02/18/2022    K 4.7 02/18/2022    CO2 23 02/18/2022    CALCIUM 9.4 02/18/2022    PROTENTOTREF 7.0 02/18/2022    ALBUMIN 4.5 02/18/2022    LABIL2 1.8 02/18/2022    AST 22 02/18/2022    ALT 13 02/18/2022       Lab Results   Component Value Date    WBC 6.58 05/22/2022    HGB 11.1 (L) 05/22/2022    HCT 34.5 (L) 05/22/2022    " MCV 94.5 05/22/2022     05/22/2022       Lab Results   Component Value Date    TROPONINI 0.013 05/21/2022       Lab Results   Component Value Date    CHOL 156 08/02/2019    CHOL 235 (H) 02/08/2019    CHOL 186 09/13/2018    CHLPL 170 02/18/2022    CHLPL 167 08/09/2021    CHLPL 165 03/19/2021     Lab Results   Component Value Date    TRIG 52 02/18/2022    TRIG 81 08/09/2021    TRIG 75 03/19/2021     Lab Results   Component Value Date    HDL 74 02/18/2022    HDL 63 (H) 08/09/2021    HDL 71 (H) 03/19/2021     Lab Results   Component Value Date    LDL 85 02/18/2022    LDL 89 08/09/2021    LDL 80 03/19/2021       Lab Results   Component Value Date    TSH 1.87 03/05/2018         ECG 12 Lead    Date/Time: 6/1/2022 12:49 PM  Performed by: Fuad Juarez MD  Authorized by: Fuad Juarez MD   Comparison: compared with previous ECG from 5/21/2022  Similar to previous ECG  Rhythm: sinus rhythm        Echocardiogram 5/22/2022 [facility]:  · The ejection fraction biplane was calculated at 63%. The left ventricular chamber size, wall thickness, and systolic function are within normal limits. There are no regional wall motion abnormalities observed. Abnormal left ventricular diastolic filling consistent with impaired relaxation. Mitral valve tissue Doppler E/E'' ratio consistent with normal left atrial pressures.  · Mild mitral regurgitation is present.   · Mild aortic leaflet calcification.   · Mild tricuspid regurgitation.   · Right ventricular systolic pressure of 62 mmHg.     Pulmonary emboli 5/21/2022 [facility]:  · Multiple acute pulmonary emboli are noted located most proximally in the distal aspect of the right main pulmonary artery with involvement of multiple lobar, segmental, and subsegmental branches of the bilateral upper lobe, right middle lobe, and bilateral lower lobes. The RV to LV ratio is less than 1.     Echocardiogram 11/12/2021 images reviewed by myself:  · Left ventricular ejection  fraction appears to be 61 - 65%. Left ventricular systolic function is normal. Left ventricular diastolic function is consistent with (grade II w/high LAP) pseudonormalization  · Normal right ventricular cavity size and systolic function noted.  · The left atrial cavity is mild to moderately dilated.  · Mild mitral valve regurgitation is present.  · Mild tricuspid valve regurgitation is present  · Calculated right ventricular systolic pressure from tricuspid regurgitation is 39.0 mmHg.  · There is no evidence of pericardial effusion.            Assessment:          Diagnosis Plan   1. Acute pulmonary embolism without acute cor pulmonale, unspecified pulmonary embolism type (HCC)  ECG 12 Lead    Adult Transthoracic Echo Complete W/ Cont if Necessary Per Protocol   2. Factor 5 Leiden mutation, heterozygous (HCC)     3. Pulmonary hypertension (HCC)  Adult Transthoracic Echo Complete W/ Cont if Necessary Per Protocol   4. Primary hypertension     5. PFO (patent foramen ovale)            Plan:       Ms. Rao is a 86 y.o. woman with past medical history notable for patent foramen ovale, hypertension, mixed hyperlipidemia who presents to our office for scheduled follow up.  From a cardiac standpoint she is doing reasonably well would continue to increase activity as tolerated and recommended continued use of compression stockings.  I recommend lifelong anticoagulation and a repeat echocardiogram in 3 months to reassess pulmonary pressures.    Pulmonary emboli:  · Likely provoked PE and DVT however in the setting of known factor V Leiden would recommend lifelong anticoagulation    Factor V Leiden:  · Continue anticoagulation    Pulmonary hypertension:  · Follow-up echocardiogram in 3 months with office visit    Hypertension:  · Continue losartan 25 mg daily  · Continue amlodipine from 5 mg daily as had edema with BID dosing  · Continue 100 mg of metoprolol heart rate.    Patent foramen ovale:  · Likely an incidental  finding at this point given her advanced age  · We will follow up on repeat echocardiogram    Mixed hyperlipidemia:  · Continue statin  · Lipid panel 2/2022 demonstrates reasonable control of LDL  · CMP 2/22 demonstrates normal LFT and AST      Follow-up:  3 Months    Thank you for allowing me to participate in the care of Eliane LOYDA Rao. Feel free to contact me directly with any further questions or concerns.    Fuad Juarez MD  [facility]  06/01/22  13:05 EDT

## 2022-06-14 ENCOUNTER — NURSE TRIAGE (OUTPATIENT)
Dept: CALL CENTER | Facility: HOSPITAL | Age: 87
End: 2022-06-14

## 2022-06-14 NOTE — TELEPHONE ENCOUNTER
"    Reason for Disposition  • Thigh, calf, or ankle swelling in only one leg    Additional Information  • Negative: Sounds like a life-threatening emergency to the triager  • Negative: Chest pain  • Negative: Small area of swelling and followed an insect bite to the area  • Negative: Followed a knee injury  • Negative: Ankle or foot injury  • Negative: Pregnant with leg swelling or edema  • Negative: Difficulty breathing at rest  • Negative: Entire foot is cool or blue in comparison to other side  • Negative: SEVERE swelling (e.g., swelling extends above knee, entire leg is swollen, weeping fluid)  • Negative: Thigh or calf pain and only 1 side and present > 1 hour    Answer Assessment - Initial Assessment Questions  1. ONSET: \"When did the swelling start?\" (e.g., minutes, hours, days)      Yesterday  2. LOCATION: \"What part of the leg is swollen?\"  \"Are both legs swollen or just one leg?\"      Calf of left leg  3. SEVERITY: \"How bad is the swelling?\" (e.g., localized; mild, moderate, severe)   - Localized - small area of swelling localized to one leg   - MILD pedal edema - swelling limited to foot and ankle, pitting edema < 1/4 inch (6 mm) deep, rest and elevation eliminate most or all swelling   - MODERATE edema - swelling of lower leg to knee, pitting edema > 1/4 inch (6 mm) deep, rest and elevation only partially reduce swelling   - SEVERE edema - swelling extends above knee, facial or hand swelling present       mild  4. REDNESS: \"Does the swelling look red or infected?\"      Wearing compression hose, she checked and no redness  5. PAIN: \"Is the swelling painful to touch?\" If Yes, ask: \"How painful is it?\"   (Scale 1-10; mild, moderate or severe)      Not painful  6. FEVER: \"Do you have a fever?\" If Yes, ask: \"What is it, how was it measured, and when did it start?\"       afebrile  7. CAUSE: \"What do you think is causing the leg swelling?\"      She is worried about additional DVT as the swelling had " "resolved  8. MEDICAL HISTORY: \"Do you have a history of heart failure, kidney disease, liver failure, or cancer?\"      DVT two weeks ago and taking eliquis  9. RECURRENT SYMPTOM: \"Have you had leg swelling before?\" If Yes, ask: \"When was the last time?\" \"What happened that time?\"      Yes two weeks  10. OTHER SYMPTOMS: \"Do you have any other symptoms?\" (e.g., chest pain, difficulty breathing)        No chest pain, no shortness of  air  11. PREGNANCY: \"Is there any chance you are pregnant?\" \"When was your last menstrual period?\"        na    Protocols used: LEG SWELLING AND EDEMA-ADULT-OH      "

## 2022-07-26 ENCOUNTER — OFFICE VISIT (OUTPATIENT)
Dept: INTERNAL MEDICINE | Facility: CLINIC | Age: 87
End: 2022-07-26

## 2022-07-26 VITALS
DIASTOLIC BLOOD PRESSURE: 74 MMHG | HEART RATE: 65 BPM | SYSTOLIC BLOOD PRESSURE: 142 MMHG | BODY MASS INDEX: 22.68 KG/M2 | HEIGHT: 63 IN | WEIGHT: 128 LBS | OXYGEN SATURATION: 98 % | TEMPERATURE: 98.5 F

## 2022-07-26 DIAGNOSIS — S80.10XA CONTUSION OF LOWER LEG, UNSPECIFIED LATERALITY, INITIAL ENCOUNTER: Primary | ICD-10-CM

## 2022-07-26 PROCEDURE — 99213 OFFICE O/P EST LOW 20 MIN: CPT | Performed by: FAMILY MEDICINE

## 2022-07-26 NOTE — PROGRESS NOTES
"    Chief Complaint  Wound Check (Left lower leg has a none healing wound with redness) and Bleeding/Bruising (Bruises and bleeding easily )    Subjective    History of Present Illness {CC  Problem List  Visit  Diagnosis   Encounters  Notes  Medications  Labs  Result Review Imaging  Media :23}     Eliane Rao presents to [facility] for   History of Present Illness   87 yo female present for evaluation of  L lower leg wound. About 3 weeks ago she hit her leg while reaching to get something out of car. She has recent kept area uncovered. She has notice some improvement the last day or so. It is slowly healing with new bruises appearing often.  R leg bruise from neighbors dog.     Small burn face from her curling iron.      Social History     Socioeconomic History   • Marital status:    Tobacco Use   • Smoking status: Former Smoker     Packs/day: 0.15     Years: 10.00     Pack years: 1.50     Types: Cigarettes     Quit date: 1990     Years since quittin.5   • Smokeless tobacco: Never Used   Vaping Use   • Vaping Use: Never used   Substance and Sexual Activity   • Alcohol use: Yes     Alcohol/week: 6.0 standard drinks     Types: 6 Glasses of wine per week     Comment: Occasional/caffeine use   • Drug use: No   • Sexual activity: Not Currently     Partners: Male     Birth control/protection: Surgical      Objective     Vital Signs:   /74   Pulse 65   Temp 98.5 °F (36.9 °C)   Ht 160 cm (63\")   Wt 58.1 kg (128 lb)   SpO2 98%   BMI 22.67 kg/m²   Physical Exam  Constitutional:       General: She is not in acute distress.     Appearance: She is not ill-appearing.   HENT:      Head: Normocephalic.   Eyes:      Conjunctiva/sclera: Conjunctivae normal.   Skin:     Comments: L leg healing scar on shin, no drainage, min erythema, non tender  R lower leg, small bruises  Burn on upper L cheek   Neurological:      Mental Status: She is alert.        Result " Review  Data Reviewed:{ Labs  Result Review  Imaging  Med Tab  Media :23}   The following data was reviewed by: Wen Jackson MD on 07/26/2022  Lab Results - Last 18 Months   Lab Units 05/22/22  0259 05/21/22  1123 02/18/22  0943 08/09/21  0937 03/19/21  0940   BUN mg/dL  --   --  25 20 21   CREATININE mg/dL  --   --  1.05* 0.98 0.97   EGFR IF NONAFRICN AM mL/min/1.73  --   --  49* 54* 55*   EGFR IF AFRICN AM mL/min/1.73  --   --  56* 66 66   SODIUM mmol/L  --   --  141 141 140   POTASSIUM mmol/L  --   --  4.7 4.5 4.5   CHLORIDE mmol/L  --   --  105 105 103   CALCIUM mg/dL  --   --  9.4 9.5 9.3   ALBUMIN g/dL  --   --  4.5 4.30 4.10   BILIRUBIN mg/dL  --   --  0.6 0.4 1.0   ALK PHOS IU/L  --   --  88 72 69   AST (SGOT) IU/L  --   --  22 22 18   ALT (SGPT) IU/L  --   --  13 14 13   CHOLESTEROL mg/dL  --   --  170 167 165   TRIGLYCERIDES mg/dL  --   --  52 81 75   HDL CHOL mg/dL  --   --  74 63* 71*   VLDL CHOLESTEROL RHONDA mg/dL  --   --  11 15 14   LDL CHOL mg/dL  --   --  85 89 80   WBC 10*3/uL 6.58 7.01  --   --  5.29   RBC 10*6/uL 3.65* 4.10  --   --  4.23   HEMATOCRIT % 34.5* 38.8  --   --  39.6   MCV fL 94.5 94.6  --   --  93.6   MCH pg 30.4 30.7  --   --  30.7              Current Outpatient Medications:   •  amLODIPine (NORVASC) 5 MG tablet, TAKE ONE TABLET BY MOUTH TWICE A DAY, Disp: 180 tablet, Rfl: 1  •  apixaban (ELIQUIS) 5 MG tablet tablet, Take 1 tablet twice daily, Disp: 28 tablet, Rfl: 0  •  calcium carb-cholecalciferol 600-800 MG-UNIT tablet, Take  by mouth., Disp: , Rfl:   •  Lactobacillus Rhamnosus, GG, (CULTURELLE PO), Take  by mouth Daily., Disp: , Rfl:   •  losartan (COZAAR) 25 MG tablet, Take 1 tablet by mouth Daily., Disp: 90 tablet, Rfl: 3  •  metoprolol tartrate (LOPRESSOR) 100 MG tablet, Take 1 tablet by mouth 2 (Two) Times a Day., Disp: 180 tablet, Rfl: 3  •  montelukast (SINGULAIR) 10 MG tablet, Take 1 tablet by mouth Every Night., Disp: 90 tablet, Rfl: 1      Assessment and Plan {CC  Problem List  Visit Diagnosis  ROS  Review (Popup)  Health Maintenance  Quality  BestPractice  Medications  SmartSets  SnapShot Encounters  Media :23}   Problem List Items Addressed This Visit    None     Visit Diagnoses     Contusion of lower leg, unspecified laterality, initial encounter    -  Primary        Discussed with patient about side effect of eliquis. She states aware but just nervious.  Wound on L leg is healing well, no sign of infection. Advise keep skin clean, dry and moisturized. Seek medical attention if notice increase pain, drainage or redness.    Keep scheduled apppointment in a few weeks.         Follow Up   Return if symptoms worsen or fail to improve, for keep scheduled appointment. .  Patient was given instructions and counseling regarding her condition or for health maintenance advice. Please see specific information pulled into the AVS if appropriate.    EpicAct:[email or web address]_ORDERS,RunParams:STARTUPTYPE=FOLLOW    MR_FAMILIA_AMB_DISCHARGE

## 2022-08-04 RX ORDER — LOSARTAN POTASSIUM 25 MG/1
TABLET ORAL
Qty: 90 TABLET | Refills: 0 | Status: SHIPPED | OUTPATIENT
Start: 2022-08-04 | End: 2022-09-01 | Stop reason: ALTCHOICE

## 2022-08-19 ENCOUNTER — OFFICE VISIT (OUTPATIENT)
Dept: INTERNAL MEDICINE | Facility: CLINIC | Age: 87
End: 2022-08-19

## 2022-08-19 VITALS
TEMPERATURE: 98.1 F | SYSTOLIC BLOOD PRESSURE: 142 MMHG | HEART RATE: 64 BPM | DIASTOLIC BLOOD PRESSURE: 70 MMHG | HEIGHT: 63 IN | OXYGEN SATURATION: 97 % | WEIGHT: 128 LBS | BODY MASS INDEX: 22.68 KG/M2

## 2022-08-19 DIAGNOSIS — Z00.00 MEDICARE ANNUAL WELLNESS VISIT, SUBSEQUENT: Primary | ICD-10-CM

## 2022-08-19 DIAGNOSIS — E78.00 HYPERCHOLESTEROLEMIA: ICD-10-CM

## 2022-08-19 DIAGNOSIS — D68.51 FACTOR 5 LEIDEN MUTATION, HETEROZYGOUS: ICD-10-CM

## 2022-08-19 DIAGNOSIS — K58.0 IRRITABLE BOWEL SYNDROME WITH DIARRHEA: ICD-10-CM

## 2022-08-19 DIAGNOSIS — Z78.0 POST-MENOPAUSAL: ICD-10-CM

## 2022-08-19 DIAGNOSIS — I10 PRIMARY HYPERTENSION: ICD-10-CM

## 2022-08-19 PROCEDURE — 1126F AMNT PAIN NOTED NONE PRSNT: CPT | Performed by: FAMILY MEDICINE

## 2022-08-19 PROCEDURE — 1159F MED LIST DOCD IN RCRD: CPT | Performed by: FAMILY MEDICINE

## 2022-08-19 PROCEDURE — G0439 PPPS, SUBSEQ VISIT: HCPCS | Performed by: FAMILY MEDICINE

## 2022-08-19 PROCEDURE — 1170F FXNL STATUS ASSESSED: CPT | Performed by: FAMILY MEDICINE

## 2022-08-19 NOTE — PROGRESS NOTES
The ABCs of the Annual Wellness Visit  Subsequent Medicare Wellness Visit    Chief Complaint   Patient presents with   • Medicare Wellness-subsequent      Subjective    History of Present Illness:  Eliane Rao is a 87 y.o. female who presents for a Subsequent Medicare Wellness Visit.    The following portions of the patient's history were reviewed and   updated as appropriate: allergies, current medications, past family history, past medical history, past social history, past surgical history and problem list    Compared to one year ago, the patient feels her physical   health is the same. She does not have as much energy.     Compared to one year ago, the patient feels her mental   health is the same.    Recent Hospitalizations:  She was admitted within the past 365 days at [facility].       Current Medical Providers:  Patient Care Team:  Wen Jackson MD as PCP - General (Family Medicine)  Eduardo Goldberg MD as Consulting Physician (Gastroenterology)    Outpatient Medications Prior to Visit   Medication Sig Dispense Refill   • amLODIPine (NORVASC) 5 MG tablet TAKE ONE TABLET BY MOUTH TWICE A  tablet 1   • apixaban (ELIQUIS) 5 MG tablet tablet Take 1 tablet twice daily 28 tablet 0   • calcium carb-cholecalciferol 600-800 MG-UNIT tablet Take  by mouth.     • Lactobacillus Rhamnosus, GG, (CULTURELLE PO) Take  by mouth Daily.     • losartan (COZAAR) 25 MG tablet TAKE ONE TABLET BY MOUTH DAILY 90 tablet 0   • metoprolol tartrate (LOPRESSOR) 100 MG tablet Take 1 tablet by mouth 2 (Two) Times a Day. 180 tablet 3   • montelukast (SINGULAIR) 10 MG tablet Take 1 tablet by mouth Every Night. 90 tablet 1     No facility-administered medications prior to visit.       No opioid medication identified on active medication list. I have reviewed chart for other potential  high risk medication/s and harmful drug interactions in the elderly.          Aspirin is not on active medication list.  Aspirin use is  "contraindicated for this patient due to: current use of Eliquis.  .    Patient Active Problem List   Diagnosis   • Hypercholesterolemia   • Hypertension   • Chronic renal insufficiency   • IBS (irritable bowel syndrome)   • PFO (patent foramen ovale)   • Left hip pain   • Fall   • Allergy   • Former smoker   • Acute pulmonary embolism without acute cor pulmonale (HCC)   • Factor 5 Leiden mutation, heterozygous (HCC)   • Pulmonary hypertension (HCC)     Advance Care Planning  Advance Directive is not on file.  ACP discussion was held with the patient during this visit. Patient has an advance directive (not in EMR), copy requested.    Review of Systems   Constitutional: Negative for chills and fever.   HENT: Positive for sneezing. Negative for congestion and rhinorrhea.    Eyes: Negative for visual disturbance.   Respiratory: Positive for shortness of breath. Negative for cough.    Cardiovascular: Negative for chest pain and leg swelling.   Gastrointestinal: Positive for diarrhea. Negative for abdominal pain, nausea and vomiting.   Genitourinary: Negative for difficulty urinating, vaginal bleeding and vaginal discharge.   Musculoskeletal: Positive for arthralgias and back pain.        Occasional back pain if over do it.    Skin: Positive for rash.   Neurological: Negative for dizziness.   Hematological: Bruises/bleeds easily.   Psychiatric/Behavioral: Negative for behavioral problems, dysphoric mood and sleep disturbance.        Objective    Vitals:    08/19/22 0932   BP: 142/70   Pulse: 64   Temp: 98.1 °F (36.7 °C)   SpO2: 97%   Weight: 58.1 kg (128 lb)   Height: 160 cm (63\")   PainSc: 0-No pain     Estimated body mass index is 22.67 kg/m² as calculated from the following:    Height as of this encounter: 160 cm (63\").    Weight as of this encounter: 58.1 kg (128 lb).    BMI is within normal parameters. No other follow-up for BMI required.      Does the patient have evidence of cognitive impairment? No    Physical " Exam  Constitutional:       General: She is not in acute distress.     Appearance: She is not ill-appearing.   HENT:      Head: Normocephalic.      Right Ear: Tympanic membrane normal.      Left Ear: Tympanic membrane normal.      Mouth/Throat:      Mouth: Mucous membranes are moist.      Pharynx: No oropharyngeal exudate or posterior oropharyngeal erythema.   Eyes:      Conjunctiva/sclera: Conjunctivae normal.   Cardiovascular:      Rate and Rhythm: Regular rhythm.      Heart sounds: Normal heart sounds.   Pulmonary:      Effort: No respiratory distress.      Breath sounds: Normal breath sounds. No wheezing.   Abdominal:      General: There is no distension.      Palpations: Abdomen is soft.      Tenderness: There is no abdominal tenderness.   Musculoskeletal:      Cervical back: Normal range of motion and neck supple.      Right lower leg: No edema.      Left lower leg: No edema.   Neurological:      Mental Status: She is alert and oriented to person, place, and time.   Psychiatric:         Mood and Affect: Mood normal.                 HEALTH RISK ASSESSMENT    Smoking Status:  Social History     Tobacco Use   Smoking Status Former Smoker   • Packs/day: 0.15   • Years: 10.00   • Pack years: 1.50   • Types: Cigarettes   • Quit date: 1990   • Years since quittin.6   Smokeless Tobacco Never Used   Tobacco Comment    Very light smoker beginning in college stopping in      Alcohol Consumption:  Social History     Substance and Sexual Activity   Alcohol Use Yes   • Alcohol/week: 4.0 standard drinks   • Types: 4 Glasses of wine per week    Comment: Occasional/caffeine use     Fall Risk Screen:    KURTISADI Fall Risk Assessment was completed, and patient is at LOW risk for falls.Assessment completed on:2022    Depression Screening:  PHQ-2/PHQ-9 Depression Screening 2022   Retired PHQ-9 Total Score -   Retired Total Score -   Little Interest or Pleasure in Doing Things 0-->not at all   Feeling Down,  Depressed or Hopeless 0-->not at all   PHQ-9: Brief Depression Severity Measure Score 0       Health Habits and Functional and Cognitive Screening:  Functional & Cognitive Status 8/19/2022   Do you have difficulty preparing food and eating? No   Do you have difficulty bathing yourself, getting dressed or grooming yourself? No   Do you have difficulty using the toilet? No   Do you have difficulty moving around from place to place? No   Do you have trouble with steps or getting out of a bed or a chair? No   Current Diet Well Balanced Diet   Dental Exam Not up to date   Eye Exam Up to date   Exercise (times per week) 2 times per week   Current Exercises Include Walking   Current Exercise Activities Include -   Do you need help using the phone?  No   Are you deaf or do you have serious difficulty hearing?  No   Do you need help with transportation? No   Do you need help shopping? No   Do you need help preparing meals?  No   Do you need help with housework?  No   Do you need help with laundry? No   Do you need help taking your medications? No   Do you need help managing money? No   Do you ever drive or ride in a car without wearing a seat belt? No   Have you felt unusual stress, anger or loneliness in the last month? No   Who do you live with? Alone   If you need help, do you have trouble finding someone available to you? No   Have you been bothered in the last four weeks by sexual problems? No   Do you have difficulty concentrating, remembering or making decisions? No       Age-appropriate Screening Schedule:  Refer to the list below for future screening recommendations based on patient's age, sex and/or medical conditions. Orders for these recommended tests are listed in the plan section. The patient has been provided with a written plan.    Health Maintenance   Topic Date Due   • TDAP/TD VACCINES (1 - Tdap) Never done   • MAMMOGRAM  08/02/2021   • DXA SCAN  01/06/2022   • INFLUENZA VACCINE  10/01/2022   • LIPID PANEL   02/18/2023   • ZOSTER VACCINE  Completed              Assessment & Plan   CMS Preventative Services Quick Reference  Risk Factors Identified During Encounter  Immunizations Discussed/Encouraged (specific Immunizations; Td and COVID19  The above risks/problems have been discussed with the patient.  Follow up actions/plans if indicated are seen below in the Assessment/Plan Section.  Pertinent information has been shared with the patient in the After Visit Summary.    Diagnoses and all orders for this visit:    1. Medicare annual wellness visit, subsequent (Primary)    2. Post-menopausal  -     DEXA Bone Density, Axial (Hospital); Future    3. Hypercholesterolemia  -     Lipid panel    4. Primary hypertension  -     Comprehensive metabolic panel    5. Irritable bowel syndrome with diarrhea  -     Urinalysis With Culture If Indicated -    6. Factor 5 Leiden mutation, heterozygous (HCC)  -     Urinalysis With Culture If Indicated -  -     CBC w AUTO Differential        Follow Up:   Return in about 6 months (around 2/19/2023).     An After Visit Summary and PPPS were made available to the patient.

## 2022-08-19 NOTE — PATIENT INSTRUCTIONS
Advance Care Planning and Advance Directives     You make decisions on a daily basis - decisions about where you want to live, your career, your home, your life. Perhaps one of the most important decisions you face is your choice for future medical care. Take time to talk with your family and your healthcare team and start planning today.  Advance Care Planning is a process that can help you:  · Understand possible future healthcare decisions in light of your own experiences  · Reflect on those decision in light of your goals and values  · Discuss your decisions with those closest to you and the healthcare professionals that care for you  · Make a plan by creating a document that reflects your wishes    Surrogate Decision Maker  In the event of a medical emergency, which has left you unable to communicate or to make your own decisions, you would need someone to make decisions for you.  It is important to discuss your preferences for medical treatment with this person while you are in good health.     Qualities of a surrogate decision maker:  • Willing to take on this role and responsibility  • Knows what you want for future medical care  • Willing to follow your wishes even if they don't agree with them  • Able to make difficult medical decisions under stressful circumstances    Advance Directives  These are legal documents you can create that will guide your healthcare team and decision maker(s) when needed. These documents can be stored in the electronic medical record.    · Living Will - a legal document to guide your care if you have a terminal condition or a serious illness and are unable to communicate. States vary by statute in document names/types, but most forms may include one or more of the following:        -  Directions regarding life-prolonging treatments        -  Directions regarding artificially provided nutrition/hydration        -  Choosing a healthcare decision maker        -  Direction  regarding organ/tissue donation    · Durable Power of  for Healthcare - this document names an -in-fact to make medical decisions for you, but it may also allow this person to make personal and financial decisions for you. Please seek the advice of an  if you need this type of document.    **Advance Directives are not required and no one may discriminate against you if you do not sign one.    Medical Orders  Many states allow specific forms/orders signed by your physician to record your wishes for medical treatment in your current state of health. This form, signed in personal communication with your physician, addresses resuscitation and other medical interventions that you may or may not want.      For more information or to schedule a time with a [facility] Advance Care Planning Facilitator contact: [facility] or call [phone] and someone will contact you directly.  You are due for adacel Tdap vaccination. (provides protection against tetanus, diptheria and whooping cough) Please  get the immunization at your local pharmacy at your earliest convenience. This immunization will next be due in 10 years. Please click on the link for more information about this vaccine.    [facility] Tdap Vaccine Information      Medicare Wellness  Personal Prevention Plan of Service     Date of Office Visit:    Encounter Provider:  Wen Jackson MD  Place of Service:  [facility]  Patient Name: Eliane Rao  :  1935    As part of the Medicare Wellness portion of your visit today, we are providing you with this personalized preventive plan of services (PPPS). This plan is based upon recommendations of the [place] Preventive Services Task Force (USPSTF) and the Advisory Committee on Immunization Practices (ACIP).    This lists the preventive care services that should be considered, and provides dates of when you are due. Items listed as completed  are up-to-date and do not require any further intervention.    Health Maintenance   Topic Date Due   • TDAP/TD VACCINES (1 - Tdap) Never done   • MAMMOGRAM  08/02/2021   • DXA SCAN  01/06/2022   • COVID-19 Vaccine (4 - Booster for Moderna series) 04/09/2022   • INFLUENZA VACCINE  10/01/2022   • LIPID PANEL  02/18/2023   • ANNUAL WELLNESS VISIT  08/19/2023   • COLORECTAL CANCER SCREENING  02/04/2024   • Pneumococcal Vaccine 65+  Completed   • ZOSTER VACCINE  Completed       Orders Placed This Encounter   Procedures   • DEXA Bone Density, Axial (Hospital)     Standing Status:   Future     Standing Expiration Date:   8/19/2023     Order Specific Question:   Is patient taking or have taken long term Glucocorticoid (steroids)?     Answer:   No     Order Specific Question:   Does the patient have rheumatoid arthritis?     Answer:   No     Order Specific Question:   Does the patient have secondary osteoporosis?     Answer:   No     Order Specific Question:   Reason for Exam:     Answer:   post menopausal     Order Specific Question:   Release to patient     Answer:   Routine Release       No follow-ups on file.

## 2022-08-20 LAB
ALBUMIN SERPL-MCNC: 4.3 G/DL (ref 3.6–4.6)
ALBUMIN/GLOB SERPL: 1.7 {RATIO} (ref 1.2–2.2)
ALP SERPL-CCNC: 74 IU/L (ref 44–121)
ALT SERPL-CCNC: 13 IU/L (ref 0–32)
APPEARANCE UR: CLEAR
AST SERPL-CCNC: 18 IU/L (ref 0–40)
BACTERIA #/AREA URNS HPF: NORMAL /[HPF]
BASOPHILS # BLD AUTO: 0.1 X10E3/UL (ref 0–0.2)
BASOPHILS NFR BLD AUTO: 1 %
BILIRUB SERPL-MCNC: 0.7 MG/DL (ref 0–1.2)
BILIRUB UR QL STRIP: NEGATIVE
BUN SERPL-MCNC: 15 MG/DL (ref 8–27)
BUN/CREAT SERPL: 15 (ref 12–28)
CALCIUM SERPL-MCNC: 9.4 MG/DL (ref 8.7–10.3)
CASTS URNS QL MICRO: NORMAL /LPF
CHLORIDE SERPL-SCNC: 103 MMOL/L (ref 96–106)
CHOLEST SERPL-MCNC: 254 MG/DL (ref 100–199)
CO2 SERPL-SCNC: 25 MMOL/L (ref 20–29)
COLOR UR: YELLOW
CREAT SERPL-MCNC: 0.98 MG/DL (ref 0.57–1)
EGFRCR-CYS SERPLBLD CKD-EPI 2021: 56 ML/MIN/1.73
EOSINOPHIL # BLD AUTO: 0.3 X10E3/UL (ref 0–0.4)
EOSINOPHIL NFR BLD AUTO: 6 %
EPI CELLS #/AREA URNS HPF: NORMAL /HPF (ref 0–10)
ERYTHROCYTE [DISTWIDTH] IN BLOOD BY AUTOMATED COUNT: 12.3 % (ref 11.7–15.4)
GLOBULIN SER CALC-MCNC: 2.6 G/DL (ref 1.5–4.5)
GLUCOSE SERPL-MCNC: 96 MG/DL (ref 65–99)
GLUCOSE UR QL STRIP: NEGATIVE
HCT VFR BLD AUTO: 41.1 % (ref 34–46.6)
HDLC SERPL-MCNC: 71 MG/DL
HGB BLD-MCNC: 13.1 G/DL (ref 11.1–15.9)
HGB UR QL STRIP: NEGATIVE
IMM GRANULOCYTES # BLD AUTO: 0 X10E3/UL (ref 0–0.1)
IMM GRANULOCYTES NFR BLD AUTO: 0 %
KETONES UR QL STRIP: NEGATIVE
LDLC SERPL CALC-MCNC: 162 MG/DL (ref 0–99)
LEUKOCYTE ESTERASE UR QL STRIP: NEGATIVE
LYMPHOCYTES # BLD AUTO: 1.8 X10E3/UL (ref 0.7–3.1)
LYMPHOCYTES NFR BLD AUTO: 29 %
MCH RBC QN AUTO: 30.2 PG (ref 26.6–33)
MCHC RBC AUTO-ENTMCNC: 31.9 G/DL (ref 31.5–35.7)
MCV RBC AUTO: 95 FL (ref 79–97)
MICRO URNS: ABNORMAL
MONOCYTES # BLD AUTO: 0.5 X10E3/UL (ref 0.1–0.9)
MONOCYTES NFR BLD AUTO: 9 %
NEUTROPHILS # BLD AUTO: 3.4 X10E3/UL (ref 1.4–7)
NEUTROPHILS NFR BLD AUTO: 55 %
NITRITE UR QL STRIP: NEGATIVE
PH UR STRIP: 6.5 [PH] (ref 5–7.5)
PLATELET # BLD AUTO: 270 X10E3/UL (ref 150–450)
POTASSIUM SERPL-SCNC: 4.6 MMOL/L (ref 3.5–5.2)
PROT SERPL-MCNC: 6.9 G/DL (ref 6–8.5)
PROT UR QL STRIP: ABNORMAL
RBC # BLD AUTO: 4.34 X10E6/UL (ref 3.77–5.28)
RBC #/AREA URNS HPF: NORMAL /HPF (ref 0–2)
SODIUM SERPL-SCNC: 141 MMOL/L (ref 134–144)
SP GR UR STRIP: 1.01 (ref 1–1.03)
TRIGL SERPL-MCNC: 120 MG/DL (ref 0–149)
URINALYSIS REFLEX: ABNORMAL
UROBILINOGEN UR STRIP-MCNC: 0.2 MG/DL (ref 0.2–1)
VLDLC SERPL CALC-MCNC: 21 MG/DL (ref 5–40)
WBC # BLD AUTO: 6.1 X10E3/UL (ref 3.4–10.8)
WBC #/AREA URNS HPF: NORMAL /HPF (ref 0–5)

## 2022-09-01 ENCOUNTER — OFFICE VISIT (OUTPATIENT)
Dept: CARDIOLOGY | Facility: CLINIC | Age: 87
End: 2022-09-01

## 2022-09-01 ENCOUNTER — HOSPITAL ENCOUNTER (OUTPATIENT)
Dept: CARDIOLOGY | Facility: HOSPITAL | Age: 87
Discharge: HOME | End: 2022-09-01
Admitting: INTERNAL MEDICINE

## 2022-09-01 VITALS
BODY MASS INDEX: 22.7 KG/M2 | HEIGHT: 63 IN | SYSTOLIC BLOOD PRESSURE: 140 MMHG | DIASTOLIC BLOOD PRESSURE: 70 MMHG | HEART RATE: 53 BPM | WEIGHT: 128.09 LBS

## 2022-09-01 VITALS
HEIGHT: 63 IN | BODY MASS INDEX: 22.75 KG/M2 | WEIGHT: 128.4 LBS | DIASTOLIC BLOOD PRESSURE: 70 MMHG | HEART RATE: 52 BPM | SYSTOLIC BLOOD PRESSURE: 130 MMHG

## 2022-09-01 DIAGNOSIS — I26.99 ACUTE PULMONARY EMBOLISM WITHOUT ACUTE COR PULMONALE, UNSPECIFIED PULMONARY EMBOLISM TYPE: ICD-10-CM

## 2022-09-01 DIAGNOSIS — I27.20 PULMONARY HYPERTENSION: ICD-10-CM

## 2022-09-01 DIAGNOSIS — I10 PRIMARY HYPERTENSION: Chronic | ICD-10-CM

## 2022-09-01 DIAGNOSIS — I27.82 CHRONIC PULMONARY EMBOLISM WITHOUT ACUTE COR PULMONALE, UNSPECIFIED PULMONARY EMBOLISM TYPE: Primary | ICD-10-CM

## 2022-09-01 DIAGNOSIS — D68.51 FACTOR 5 LEIDEN MUTATION, HETEROZYGOUS: ICD-10-CM

## 2022-09-01 LAB
AORTIC ARCH: 2.1 CM
ASCENDING AORTA: 2.6 CM
BH CV ECHO MEAS - ACS: 1.46 CM
BH CV ECHO MEAS - AO MAX PG: 8.5 MMHG
BH CV ECHO MEAS - AO MEAN PG: 5.1 MMHG
BH CV ECHO MEAS - AO ROOT DIAM: 2.9 CM
BH CV ECHO MEAS - AO V2 MAX: 145.5 CM/SEC
BH CV ECHO MEAS - AO V2 VTI: 35.7 CM
BH CV ECHO MEAS - AVA(I,D): 2.33 CM2
BH CV ECHO MEAS - EDV(CUBED): 75 ML
BH CV ECHO MEAS - EDV(MOD-SP2): 61 ML
BH CV ECHO MEAS - EDV(MOD-SP4): 61 ML
BH CV ECHO MEAS - EF(MOD-BP): 65.3 %
BH CV ECHO MEAS - EF(MOD-SP2): 63.9 %
BH CV ECHO MEAS - EF(MOD-SP4): 65.6 %
BH CV ECHO MEAS - ESV(CUBED): 9.4 ML
BH CV ECHO MEAS - ESV(MOD-SP2): 22 ML
BH CV ECHO MEAS - ESV(MOD-SP4): 21 ML
BH CV ECHO MEAS - FS: 50 %
BH CV ECHO MEAS - IVS/LVPW: 1.07 CM
BH CV ECHO MEAS - IVSD: 0.78 CM
BH CV ECHO MEAS - LAT PEAK E' VEL: 7.1 CM/SEC
BH CV ECHO MEAS - LV DIASTOLIC VOL/BSA (35-75): 38.1 CM2
BH CV ECHO MEAS - LV MASS(C)D: 94.3 GRAMS
BH CV ECHO MEAS - LV MAX PG: 4.7 MMHG
BH CV ECHO MEAS - LV MEAN PG: 2.6 MMHG
BH CV ECHO MEAS - LV SYSTOLIC VOL/BSA (12-30): 13.1 CM2
BH CV ECHO MEAS - LV V1 MAX: 108.8 CM/SEC
BH CV ECHO MEAS - LV V1 VTI: 25.2 CM
BH CV ECHO MEAS - LVIDD: 4.2 CM
BH CV ECHO MEAS - LVIDS: 2.11 CM
BH CV ECHO MEAS - LVOT AREA: 3.3 CM2
BH CV ECHO MEAS - LVOT DIAM: 2.05 CM
BH CV ECHO MEAS - LVPWD: 0.73 CM
BH CV ECHO MEAS - MED PEAK E' VEL: 5.4 CM/SEC
BH CV ECHO MEAS - MV A MAX VEL: 33.4 CM/SEC
BH CV ECHO MEAS - MV DEC SLOPE: 357.8 CM/SEC2
BH CV ECHO MEAS - MV E MAX VEL: 72.4 CM/SEC
BH CV ECHO MEAS - MV E/A: 2.17
BH CV ECHO MEAS - MV MAX PG: 3.7 MMHG
BH CV ECHO MEAS - MV MEAN PG: 0.73 MMHG
BH CV ECHO MEAS - MV P1/2T: 79.1 MSEC
BH CV ECHO MEAS - MV V2 VTI: 29.1 CM
BH CV ECHO MEAS - MVA(P1/2T): 2.8 CM2
BH CV ECHO MEAS - MVA(VTI): 2.9 CM2
BH CV ECHO MEAS - PA ACC TIME: 0.11 SEC
BH CV ECHO MEAS - PA PR(ACCEL): 31.5 MMHG
BH CV ECHO MEAS - PA V2 MAX: 70.4 CM/SEC
BH CV ECHO MEAS - PI END-D VEL: 84.3 CM/SEC
BH CV ECHO MEAS - PULM A REVS DUR: 0.14 SEC
BH CV ECHO MEAS - PULM A REVS VEL: 18.4 CM/SEC
BH CV ECHO MEAS - PULM DIAS VEL: 42.4 CM/SEC
BH CV ECHO MEAS - PULM S/D: 0.85
BH CV ECHO MEAS - PULM SYS VEL: 36 CM/SEC
BH CV ECHO MEAS - RAP SYSTOLE: 3 MMHG
BH CV ECHO MEAS - RVOT DIAM: 1.77 CM
BH CV ECHO MEAS - RVSP: 36.4 MMHG
BH CV ECHO MEAS - SI(MOD-SP2): 24.4 ML/M2
BH CV ECHO MEAS - SI(MOD-SP4): 25 ML/M2
BH CV ECHO MEAS - SUP REN AO DIAM: 2.1 CM
BH CV ECHO MEAS - SV(LVOT): 83.3 ML
BH CV ECHO MEAS - SV(MOD-SP2): 39 ML
BH CV ECHO MEAS - SV(MOD-SP4): 40 ML
BH CV ECHO MEAS - TAPSE (>1.6): 2.6 CM
BH CV ECHO MEAS - TR MAX PG: 33.4 MMHG
BH CV ECHO MEAS - TR MAX VEL: 289.1 CM/SEC
BH CV ECHO MEASUREMENTS AVERAGE E/E' RATIO: 11.58
BH CV XLRA - RV BASE: 2.9 CM
BH CV XLRA - RV LENGTH: 5.9 CM
BH CV XLRA - RV MID: 2.29 CM
BH CV XLRA - TDI S': 10.9 CM/SEC
LEFT ATRIUM VOLUME INDEX: 31.9 ML/M2
MAXIMAL PREDICTED HEART RATE: 134 BPM
SINUS: 3.1 CM
STJ: 2.18 CM
STRESS TARGET HR: 114 BPM

## 2022-09-01 PROCEDURE — 93000 ELECTROCARDIOGRAM COMPLETE: CPT | Performed by: INTERNAL MEDICINE

## 2022-09-01 PROCEDURE — 93306 TTE W/DOPPLER COMPLETE: CPT

## 2022-09-01 PROCEDURE — 99214 OFFICE O/P EST MOD 30 MIN: CPT | Performed by: INTERNAL MEDICINE

## 2022-09-01 PROCEDURE — 93306 TTE W/DOPPLER COMPLETE: CPT | Performed by: INTERNAL MEDICINE

## 2022-09-01 RX ORDER — METOPROLOL TARTRATE 50 MG/1
50 TABLET, FILM COATED ORAL 2 TIMES DAILY
Qty: 180 TABLET | Refills: 3 | Status: SHIPPED | OUTPATIENT
Start: 2022-09-01 | End: 2023-03-10 | Stop reason: SDUPTHER

## 2022-09-01 NOTE — PROGRESS NOTES
[facility] Follow Up Office Note     Encounter Date:22  Patient:Eliane Rao  :1935  MRN:[id number]      Chief Complaint:   Chief Complaint   Patient presents with   • PFO     3 month f/u   • Pulmonary Embolism       History of Presenting Illness:      Ms. Rao is a 87 y.o. woman with past medical history notable for factor V Leiden, history of DVT/PE, patent foramen ovale, hypertension, mixed hyperlipidemia who presents to our office for scheduled follow up.  Overall she is doing fairly well.  She has been noticing a rash or red spots on her arms and legs since starting Eliquis.  She did see her dermatologist who did not really know what was going on either.  Outside of this she is doing fairly well but is a little bit more fatigued as of late.  Of note she is a little bit more bradycardic than she usually is.      Review of Systems:  Review of Systems   Constitutional: Positive for malaise/fatigue.   HENT: Negative.    Eyes: Negative.    Cardiovascular: Positive for dyspnea on exertion and leg swelling.   Respiratory: Positive for shortness of breath.    Endocrine: Negative.    Hematologic/Lymphatic: Negative.    Skin: Negative.    Musculoskeletal: Positive for joint pain and joint swelling.   Gastrointestinal: Negative.    Genitourinary: Negative.    Neurological: Negative.    Psychiatric/Behavioral: Negative.    Allergic/Immunologic: Negative.      Current Outpatient Medications on File Prior to Visit   Medication Sig Dispense Refill   • amLODIPine (NORVASC) 5 MG tablet TAKE ONE TABLET BY MOUTH TWICE A DAY (Patient taking differently: Take 5 mg by mouth Daily.) 180 tablet 1   • calcium carb-cholecalciferol 600-800 MG-UNIT tablet Take  by mouth.     • Lactobacillus Rhamnosus, GG, (CULTURELLE PO) Take  by mouth Daily.     • montelukast (SINGULAIR) 10 MG tablet Take 1 tablet by mouth Every Night. 90 tablet 1   • [DISCONTINUED] apixaban (ELIQUIS) 5 MG tablet tablet Take 1 tablet  twice daily 28 tablet 0   • [DISCONTINUED] metoprolol tartrate (LOPRESSOR) 100 MG tablet Take 1 tablet by mouth 2 (Two) Times a Day. 180 tablet 3   • [DISCONTINUED] losartan (COZAAR) 25 MG tablet TAKE ONE TABLET BY MOUTH DAILY 90 tablet 0     No current facility-administered medications on file prior to visit.        Allergies   Allergen Reactions   • Sulfa Antibiotics Nausea Only and GI Intolerance       Past Medical History:   Diagnosis Date   • Allergic Sulpha-10 yrs ago   • Arthritis 7 yrs ago   • Asthma    • Cataract 10 yrs ago   • Chest pain    • Chronic renal insufficiency    • Deep vein thrombosis (HCC)    • Diastolic dysfunction     grade 1   • Diverticulosis    • Dyspnea    • Dysuria    • Hiatal hernia    • History of cataract    • Hyperlipidemia    • Hypertension    • IBS (irritable bowel syndrome)    • Insomnia    • Mitral regurgitation    • Multiple polyps of sigmoid colon    • Osteopenia    • Osteoporosis    • Patent foramen ovale    • Pulmonary embolism (HCC)    • Simple goiter    • Tricuspid regurgitation    • UTI (urinary tract infection)        Past Surgical History:   Procedure Laterality Date   • CATARACT EXTRACTION, BILATERAL     • COLONOSCOPY  2013   • TUBAL ABDOMINAL LIGATION         Social History     Socioeconomic History   • Marital status:    Tobacco Use   • Smoking status: Former Smoker     Packs/day: 0.15     Years: 10.00     Pack years: 1.50     Types: Cigarettes     Quit date: 1990     Years since quittin.6   • Smokeless tobacco: Never Used   • Tobacco comment: Very light smoker beginning in college stopping in    Vaping Use   • Vaping Use: Never used   Substance and Sexual Activity   • Alcohol use: Yes     Alcohol/week: 4.0 standard drinks     Types: 4 Glasses of wine per week     Comment: Occasional/caffeine use   • Drug use: No   • Sexual activity: Not Currently     Partners: Male     Birth control/protection: Surgical       Family History   Problem  "Relation Age of Onset   • Cancer Mother    • Diabetes Other    • Cancer Maternal Aunt        The following portions of the patient's history were reviewed and updated as appropriate: allergies, current medications, past family history, past medical history, past social history, past surgical history and problem list.       Objective:       Vitals:    09/01/22 1120   BP: 130/70   BP Location: Left arm   Patient Position: Sitting   Pulse: 52   Weight: 58.2 kg (128 lb 6.4 oz)   Height: 160 cm (63\")     Body mass index is 22.75 kg/m².     Physical Exam:  Constitutional: Well appearing, well developed, no acute distress   HENT: Oropharynx clear and membrane moist  Eyes: Normal conjunctiva, no sclera icterus.  Neck: Supple, no carotid bruit bilaterally.  Cardiovascular: Regular rate and rhythm, No Murmur, Trace bilateral lower extremity edema.  Pulmonary: Normal respiratory effort, normal lung sounds, no wheezing.  Abdominal: Soft, nontender, no hepatosplenomegaly, liver is non-pulsatile.  Neurological: Alert and orient x 3.   Skin: Warm, dry, mild ecchymosis, no rash.  Psych: Appropriate mood and affect. Normal judgment and insight.      Lab Results   Component Value Date    GLUCOSE 96 08/19/2022    BUN 15 08/19/2022    CREATININE 0.98 08/19/2022    EGFRIFNONA 49 (L) 02/18/2022    EGFRIFAFRI 56 (L) 02/18/2022    BCR 15 08/19/2022    K 4.6 08/19/2022    CO2 25 08/19/2022    CALCIUM 9.4 08/19/2022    PROTENTOTREF 6.9 08/19/2022    ALBUMIN 4.3 08/19/2022    LABIL2 1.7 08/19/2022    AST 18 08/19/2022    ALT 13 08/19/2022       Lab Results   Component Value Date    WBC 6.1 08/19/2022    HGB 13.1 08/19/2022    HCT 41.1 08/19/2022    MCV 95 08/19/2022     08/19/2022       Lab Results   Component Value Date    TROPONINI 0.013 05/21/2022       Lab Results   Component Value Date    CHOL 156 08/02/2019    CHOL 235 (H) 02/08/2019    CHOL 186 09/13/2018    CHLPL 254 (H) 08/19/2022    CHLPL 170 02/18/2022    CHLPL 167 " 08/09/2021     Lab Results   Component Value Date    TRIG 120 08/19/2022    TRIG 52 02/18/2022    TRIG 81 08/09/2021     Lab Results   Component Value Date    HDL 71 08/19/2022    HDL 74 02/18/2022    HDL 63 (H) 08/09/2021     Lab Results   Component Value Date     (H) 08/19/2022    LDL 85 02/18/2022    LDL 89 08/09/2021       Lab Results   Component Value Date    TSH 1.87 03/05/2018         ECG 12 Lead    Date/Time: 9/1/2022 12:09 PM  Performed by: Fuad Juarez MD  Authorized by: Fuad Juarez MD   Comparison: compared with previous ECG from 6/1/2022  Similar to previous ECG  Rhythm: sinus rhythm        Echocardiogram 9/1/2022 with images reviewed by myself:  · Normal RV function and normalization of of PA pressures    Echocardiogram 5/22/2022 [facility]:  · The ejection fraction biplane was calculated at 63%. The left ventricular chamber size, wall thickness, and systolic function are within normal limits. There are no regional wall motion abnormalities observed. Abnormal left ventricular diastolic filling consistent with impaired relaxation. Mitral valve tissue Doppler E/E'' ratio consistent with normal left atrial pressures.  · Mild mitral regurgitation is present.   · Mild aortic leaflet calcification.   · Mild tricuspid regurgitation.   · Right ventricular systolic pressure of 62 mmHg.     Pulmonary emboli 5/21/2022 [facility]:  · Multiple acute pulmonary emboli are noted located most proximally in the distal aspect of the right main pulmonary artery with involvement of multiple lobar, segmental, and subsegmental branches of the bilateral upper lobe, right middle lobe, and bilateral lower lobes. The RV to LV ratio is less than 1.     Echocardiogram 11/12/2021:  · Left ventricular ejection fraction appears to be 61 - 65%. Left ventricular systolic function is normal. Left ventricular diastolic function is consistent with (grade II w/high LAP) pseudonormalization  · Normal right  ventricular cavity size and systolic function noted.  · The left atrial cavity is mild to moderately dilated.  · Mild mitral valve regurgitation is present.  · Mild tricuspid valve regurgitation is present  · Calculated right ventricular systolic pressure from tricuspid regurgitation is 39.0 mmHg.  · There is no evidence of pericardial effusion.        Assessment:          Diagnosis Plan   1. Chronic pulmonary embolism without acute cor pulmonale, unspecified pulmonary embolism type (HCC)  ECG 12 Lead   2. Factor 5 Leiden mutation, heterozygous (HCC)     3. Pulmonary hypertension (HCC)     4. Primary hypertension            Plan:       Ms. Rao is a 87 y.o. woman with past medical history notable for factor V Leiden, history of DVT and PE, patent foramen ovale, hypertension, mixed hyperlipidemia who presents to our office for scheduled follow up.  Overall patient is doing fairly well.  Would like to try and switch her from Eliquis over to Xarelto to see if her skin lesions or drug reaction.  Not typical for Eliqius but would be reasonable to try given that this is the only new medication that has been started since these lesions appear.  We will change to 20 mg of Xarelto daily.  She is far enough out that she does not need to load on a higher dose of Xarelto.  Again would recommend lifelong therapy.  She does complain of shortness of breath and fatigue.  Her echocardiogram x-ray shows improvement in RV function and pulmonary pressures.  She is fairly bradycardic on exam today we will decrease her metoprolol see if this improves some of her symptoms of fatigue    Pulmonary emboli:  · Likely provoked PE and DVT however in the setting of known factor V Leiden would recommend lifelong anticoagulation  · Repeat echocardiogram today demonstrates improvement in PA pressures  · Will likely repeat an echocardiogram in 1 year to monitor for any development of recurrent pulmonary hypertension in the setting of  CTEHF    Factor V Leiden:  · Continue anticoagulation    Pulmonary hypertension:  · Echocardiogram today demonstrates improvement in PA pressures  · Follow-up echocardiogram in 12 months    Hypertension:  · Continue losartan 25 mg daily  · Continue amlodipine from 5 mg daily as had edema with BID dosing  · Will decrease metoprolol from 100 mg down to 50 mg due to bradycardia.    Patent foramen ovale:  · Likely an incidental finding at this point given her advanced age  · We will follow up on repeat echocardiogram    Mixed hyperlipidemia:  · Continue statin  · Lipid panel 2/2022 demonstrates reasonable control of LDL  · CMP 2/22 demonstrates normal LFT and AST      Follow-up:  6 Months    Thank you for allowing me to participate in the care of Eliane Rao. Feel free to contact me directly with any further questions or concerns.    Fuad Juarez MD  [facility]  09/01/22  12:09 EDT

## 2022-09-01 NOTE — PATIENT INSTRUCTIONS
Will stop Eliquis and start Xarelto 20 mg daily  Will decrease metoprolol from 100 mg twice a day to 50 mg twice a day

## 2022-10-31 RX ORDER — LOSARTAN POTASSIUM 25 MG/1
TABLET ORAL
Qty: 90 TABLET | Refills: 3 | Status: SHIPPED | OUTPATIENT
Start: 2022-10-31 | End: 2022-12-13

## 2022-12-13 ENCOUNTER — OFFICE VISIT (OUTPATIENT)
Dept: INTERNAL MEDICINE | Facility: CLINIC | Age: 87
End: 2022-12-13

## 2022-12-13 VITALS
TEMPERATURE: 97.8 F | WEIGHT: 128 LBS | DIASTOLIC BLOOD PRESSURE: 86 MMHG | HEIGHT: 63 IN | OXYGEN SATURATION: 96 % | HEART RATE: 72 BPM | BODY MASS INDEX: 22.68 KG/M2 | SYSTOLIC BLOOD PRESSURE: 152 MMHG

## 2022-12-13 DIAGNOSIS — R91.1 LUNG NODULE SEEN ON IMAGING STUDY: ICD-10-CM

## 2022-12-13 DIAGNOSIS — Z79.899 HIGH RISK MEDICATION USE: ICD-10-CM

## 2022-12-13 DIAGNOSIS — I10 PRIMARY HYPERTENSION: Primary | Chronic | ICD-10-CM

## 2022-12-13 DIAGNOSIS — Z78.0 POST-MENOPAUSE: ICD-10-CM

## 2022-12-13 DIAGNOSIS — E78.00 HYPERCHOLESTEROLEMIA: Chronic | ICD-10-CM

## 2022-12-13 PROBLEM — I27.20 PULMONARY HYPERTENSION (HCC): Status: RESOLVED | Noted: 2022-06-01 | Resolved: 2022-12-13

## 2022-12-13 PROCEDURE — 99214 OFFICE O/P EST MOD 30 MIN: CPT | Performed by: STUDENT IN AN ORGANIZED HEALTH CARE EDUCATION/TRAINING PROGRAM

## 2022-12-13 PROCEDURE — 90715 TDAP VACCINE 7 YRS/> IM: CPT | Performed by: STUDENT IN AN ORGANIZED HEALTH CARE EDUCATION/TRAINING PROGRAM

## 2022-12-13 PROCEDURE — 90471 IMMUNIZATION ADMIN: CPT | Performed by: STUDENT IN AN ORGANIZED HEALTH CARE EDUCATION/TRAINING PROGRAM

## 2022-12-13 RX ORDER — PRAVASTATIN SODIUM 10 MG
10 TABLET ORAL DAILY
Qty: 30 TABLET | Refills: 2 | Status: SHIPPED | OUTPATIENT
Start: 2022-12-13 | End: 2023-03-10 | Stop reason: SDUPTHER

## 2022-12-13 RX ORDER — LOSARTAN POTASSIUM 50 MG/1
50 TABLET ORAL DAILY
Qty: 30 TABLET | Refills: 2 | Status: SHIPPED | OUTPATIENT
Start: 2022-12-13 | End: 2023-03-10 | Stop reason: SDUPTHER

## 2022-12-13 NOTE — ASSESSMENT & PLAN NOTE
Hypertension is worsening.  Dietary sodium restriction.  Regular aerobic exercise.  Medication changes per orders.  Ambulatory blood pressure monitoring.  Will increase losartan to 50 mg.  Continue amlodipine and metoprolol.  Blood pressure will be reassessed at the next regular appointment.

## 2022-12-13 NOTE — PROGRESS NOTES
"  Addi Mojica M.D.  Internal Medicine  [facility]  [address], [address]  [place] [address]  [phone]      Chief Complaint  Establish Care and Med Refill    SUBJECTIVE    History of Present Illness    Eliane Rao is a 87 y.o. female who presents to the office today as a new patient to establish care.     Chronic pulmonary embolism with pulmonary hypertension, PFO-follows with cardiology.  She is heterozygous for factor V Leiden.  She had hospitalization with acute hypoxic respiratory failure secondary to pulmonary embolism and bilateral DVT in May 2022 after a trip to [place] with 10 hour flight.  Now she is on Xarelto after having possible dermatologic reaction (red freckles) to Eliquis. \"Red freckles\" on her legs are still there. Swelling is improved with compression stockings.     Hypertension-metoprolol was decreased by cardiology due to bradycardia and fatigue.  On losartan 25 mg, amlodipine 5 mg twice daily, metoprolol 50 mg. She does not check blood pressure at home.    Hyperlipidemia-on statin.  Cholesterol in August was increased from baseline.    She does not take statin for years. She was having nighttime legs cramps.    Declines mammogram today. Has not had for years. She will think about it. No symptoms currently.    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 Plays bridge with friends. Socially active.     Review of Systems    Allergies   Allergen Reactions   • Sulfa Antibiotics Nausea Only and GI Intolerance        Outpatient Medications Marked as Taking for the 12/13/22 encounter (Office Visit) with Addi Mojica MD   Medication Sig Dispense Refill   • amLODIPine (NORVASC) 5 MG tablet TAKE ONE TABLET BY MOUTH TWICE A DAY (Patient taking differently: Take 5 mg by mouth Daily.) 180 tablet 1   • calcium carb-cholecalciferol 600-800 MG-UNIT tablet Take  by mouth.     • Lactobacillus Rhamnosus, GG, (CULTURELLE PO) Take  by mouth Daily.     • metoprolol " "tartrate (LOPRESSOR) 50 MG tablet Take 1 tablet by mouth 2 (Two) Times a Day. 180 tablet 3   • montelukast (SINGULAIR) 10 MG tablet Take 1 tablet by mouth Every Night. 90 tablet 1   • rivaroxaban (XARELTO) 20 MG tablet Take 1 tablet by mouth Daily. 90 tablet 3   • [DISCONTINUED] losartan (COZAAR) 25 MG tablet TAKE ONE TABLET BY MOUTH DAILY 90 tablet 3        Past Medical History:   Diagnosis Date   • Allergic Sulpha-10 yrs ago   • Arthritis 7 yrs ago   • Asthma    • Cataract 10 yrs ago   • Chest pain    • Chronic renal insufficiency    • Deep vein thrombosis (HCC)    • Diastolic dysfunction     grade 1   • Diverticulosis    • Dyspnea    • Dysuria    • Hiatal hernia    • History of cataract    • Hyperlipidemia    • Hypertension    • IBS (irritable bowel syndrome)    • Insomnia    • Mitral regurgitation    • Multiple polyps of sigmoid colon    • Osteopenia    • Osteoporosis    • Patent foramen ovale    • Pulmonary embolism (HCC)    • Simple goiter    • Tricuspid regurgitation    • UTI (urinary tract infection)      Past Surgical History:   Procedure Laterality Date   • CATARACT EXTRACTION, BILATERAL  2009   • COLONOSCOPY  11/14/2013   • EYE SURGERY  Cadracts removed    2015   • TONSILLECTOMY     • TUBAL ABDOMINAL LIGATION       Family History   Problem Relation Age of Onset   • Cancer Mother         Mother-lung cancer   • Cancer Maternal Aunt         Aunt-bladder   • Diabetes Other     reports that she quit smoking about 32 years ago. Her smoking use included cigarettes. She has a 5.00 pack-year smoking history. She has never used smokeless tobacco. She reports current alcohol use of about 4.0 standard drinks per week. She reports that she does not use drugs.    OBJECTIVE    Vital Signs:   /86   Pulse 72   Temp 97.8 °F (36.6 °C)   Ht 160 cm (62.99\")   Wt 58.1 kg (128 lb)   SpO2 96%   BMI 22.68 kg/m²     Physical Exam  Constitutional:       Appearance: Normal appearance. She is normal weight. "   Cardiovascular:      Rate and Rhythm: Normal rate and regular rhythm.      Heart sounds: Normal heart sounds. No murmur heard.  Pulmonary:      Effort: Pulmonary effort is normal.      Breath sounds: Normal breath sounds.   Abdominal:      General: Abdomen is flat. There is no distension.      Palpations: Abdomen is soft.      Tenderness: There is no abdominal tenderness.   Musculoskeletal:      Right lower leg: No edema.      Left lower leg: No edema.   Skin:     General: Skin is warm and dry.   Neurological:      Mental Status: She is alert.   Psychiatric:         Mood and Affect: Mood normal.         Behavior: Behavior normal.         Thought Content: Thought content normal.            The following data was reviewed by: Addi Mojica MD on 12/13/2022:  CMP    CMP 2/18/22 8/19/22   Glucose 97 96   BUN 25 15   Creatinine 1.05 (A) 0.98   eGFR Non  Am 49 (A)    eGFR African Am 56 (A)    Sodium 141 141   Potassium 4.7 4.6   Chloride 105 103   Calcium 9.4 9.4   Total Protein 7.0 6.9   Albumin 4.5 4.3   Globulin 2.5 2.6   Total Bilirubin 0.6 0.7   Alkaline Phosphatase 88 74   AST (SGOT) 22 18   ALT (SGPT) 13 13   (A) Abnormal value       Comments are available for some flowsheets but are not being displayed.           CBC w/diff    CBC w/Diff 5/21/22 5/22/22 8/19/22   WBC 7.01 6.58 6.1   RBC 4.10 3.65 (A) 4.34   Hemoglobin 12.6 11.1 (A) 13.1   Hematocrit 38.8 34.5 (A) 41.1   MCV 94.6 94.5 95   MCH 30.7 30.4 30.2   MCHC 32.5 32.2 31.9   RDW 13.2 13.2 12.3   Platelets 196 180 270   Neutrophil Rel % 77.7 69.5 55   Immature Granulocyte Rel % 1.4 (A) 0.9    Lymphocyte Rel % 9.1 (A) 17.0 29   Monocyte Rel % 9.4 9.7 9   Eosinophil Rel % 2.1 2.4 6   Basophil Rel % 0.3 0.5 1   (A) Abnormal value            Lipid Panel    Lipid Panel 2/18/22 8/19/22   Total Cholesterol 170 254 (A)   Triglycerides 52 120   HDL Cholesterol 74 71   VLDL Cholesterol 11 21   LDL Cholesterol  85 162 (A)   (A) Abnormal value                     Data reviewed: Most recent cardiology note, last PCP note.  Hospitalization note from May.           CT ANGIOGRAM CHEST FOR PE 5/21/2022 12:57 PM     HISTORY: Recent travel, left lower extremity swelling, shortness of breath.       TECHNIQUE:  CT images of the chest were obtained after the administration of intravenous contrast. 3D reconstructions were performed on an independent workstation. Radiation dose reduction techniques were utilized per ALARA protocol.     COMPARISON: Chest radiograph May 21, 2022.     FINDINGS:   Multiple acute pulmonary emboli are noted located most proximally in the distal aspect of the right main pulmonary artery with involvement of multiple lobar, segmental, and subsegmental branches of the bilateral upper lobe, right middle lobe, and   bilateral lower lobes. The RV to LV ratio is less than 1.     No cardiomegaly or pericardial effusion is identified. Scattered coronary arterial atherosclerotic calcifications are noted. The central airways are patent. With a prominent mediastinal lymph nodes are noted, which are nonspecific and likely reactive.     On lung windows, there is a groundglass opacity at the right lung apex measuring 0.9 cm (series 5, image 127). In addition, there is a 0.4 cm nodule in the lingula (series 5, image 328). Mild subpleural atelectasis, scarring, and interstitial fibrotic   change is noted at the lung bases. No pleural effusion is identified.     There is a left renal cyst as well as a subcentimeter hyperattenuating right upper pole renal lesion, which is incompletely characterized on this nondedicated examination and statistically most likely reflects a hemorrhagic/proteinaceous cyst.     No acute osseous abnormality is identified in the visualized skeleton.     IMPRESSION:   1. Multiple pulmonary emboli noted throughout all lobes located most proximally in the distal aspect of the right main pulmonary artery.   2. No CT evidence of right heart  strain.   3. Right upper lobe groundglass nodule measuring 0.9 cm as well as a solid lingular pulmonary nodule measuring 0.4 cm. Recommend CT follow-up in 6-12 months per Fleischner criteria.      Echo 6/1/2022  • Calculated left ventricular EF = 65.3% Estimated left ventricular EF was in agreement with the calculated left ventricular EF. Left ventricular systolic function is normal.  • Left ventricular diastolic function is consistent with (grade III w/high LAP) reversible restrictive pattern.  • There is calcification of the aortic valve.  • Mild to moderate mitral valve regurgitation is present.  • Moderate tricuspid valve regurgitation is present.  • Estimated right ventricular systolic pressure from tricuspid regurgitation is mildly elevated (35-45 mmHg).         ASSESSMENT & PLAN     Diagnoses and all orders for this visit:    1. Primary hypertension (Primary)  Assessment & Plan:  Hypertension is worsening.  Dietary sodium restriction.  Regular aerobic exercise.  Medication changes per orders.  Ambulatory blood pressure monitoring.  Will increase losartan to 50 mg.  Continue amlodipine and metoprolol.  Blood pressure will be reassessed at the next regular appointment.    Orders:  -     losartan (Cozaar) 50 MG tablet; Take 1 tablet by mouth Daily.  Dispense: 30 tablet; Refill: 2    2. Hypercholesterolemia  Assessment & Plan:  Lipid abnormalities are worsening.  Pharmacotherapy as ordered.  Will try pravastatin given muscle soreness.  We will titrate if patient has issues.  Lipids will be reassessed And next regular appointment..  We will check fasting labs prior.    Orders:  -     Lipid Panel With LDL / HDL Ratio; Future  -     pravastatin (PRAVACHOL) 10 MG tablet; Take 1 tablet by mouth Daily.  Dispense: 30 tablet; Refill: 2    3. High risk medication use  -     Comprehensive Metabolic Panel; Future  -     CBC & Differential; Future    4. Lung nodule seen on imaging study  -     CT Chest Without Contrast;  Future    5. Post-menopause  -     DEXA Bone Density Axial    Other orders  -     Tdap Vaccine Greater Than or Equal To 8yo IM        Health Maintenance Due   Topic Date Due   • MAMMOGRAM  08/02/2021   • DXA SCAN  01/06/2022        Follow Up  Return in about 8 months (around 8/16/2023) for Medicare Wellness.    Patient/family had no further questions at this time and verbalized understanding of the plan discussed today.

## 2022-12-13 NOTE — ASSESSMENT & PLAN NOTE
Brachial plexus Procedure Note    Pre-Procedure   Staff -        Anesthesiologist:  Ye Patel MD       Performed By: anesthesiologist       Procedure Start/Stop Times: 6/27/2022 11:00 AM and 6/27/2022 11:05 AM       Pre-Anesthestic Checklist: patient identified, IV checked, site marked, risks and benefits discussed, informed consent, monitors and equipment checked, pre-op evaluation, at physician/surgeon's request and post-op pain management  Timeout:       Correct Patient: Yes        Correct Procedure: Yes        Correct Site: Yes        Correct Position: Yes        Correct Laterality: Yes        Site Marked: Yes  Procedure Documentation  Procedure: Brachial plexus       Laterality: right       Patient Position: supine       Skin prep: Chloraprep (supraclavicular approach).       Needle Gauge: 20.        Needle Length (Inches): 4        Ultrasound guided       1. Ultrasound was used to identify targeted nerve, plexus, vascular marker, or fascial plane and place a needle adjacent to it in real-time.       2. Ultrasound was used to visualize the spread of anesthetic in close proximity to the above referenced structure.       3. A permanent image is entered into the patient's record.       4. The visualized anatomic structures appeared normal.       5. There were no apparent abnormal pathologic findings.    Assessment/Narrative         The placement was negative for: blood aspirated and painful injection       Paresthesias: Resolved.       Bolus given via needle. no blood aspirated via catheter.        Secured via.        Injection made incrementally with aspirations every 4 mL.    Medication(s) Administered   Bupivacaine 0.5% PF (Infiltration) - Infiltration   20 mL - 6/27/2022 11:00:00 AM  Medication Administration Time: 6/27/2022 11:00 AM       Lipid abnormalities are worsening.  Pharmacotherapy as ordered.  Will try pravastatin given muscle soreness.  We will titrate if patient has issues.  Lipids will be reassessed And next regular appointment..  We will check fasting labs prior.

## 2022-12-19 ENCOUNTER — HOSPITAL ENCOUNTER (OUTPATIENT)
Dept: BONE DENSITY | Facility: HOSPITAL | Age: 87
Discharge: HOME | End: 2022-12-19
Admitting: STUDENT IN AN ORGANIZED HEALTH CARE EDUCATION/TRAINING PROGRAM

## 2022-12-19 PROCEDURE — 77080 DXA BONE DENSITY AXIAL: CPT

## 2022-12-20 ENCOUNTER — TELEPHONE (OUTPATIENT)
Dept: INTERNAL MEDICINE | Facility: CLINIC | Age: 87
End: 2022-12-20

## 2022-12-20 NOTE — TELEPHONE ENCOUNTER
I called Eliane Rao at [phone] at 16:46 EST and there was no answer and no voicemail was set up.  I called that patient's mobile phone and left message for her to call clinic.  DEXA scan showed osteoporosis.  Per chart review she has been diagnosed with osteoporosis in the past but it is unclear if she has been on medication for this.  I would like to discuss therapy options.    I recommend to get 1200 mg of calcium and 1000 IUs of vitamin D through diet and supplements and to participate in a weight based exercise to prevent loss of bone mineral density. Bone mineral will be monitored every two years.

## 2023-01-06 ENCOUNTER — APPOINTMENT (OUTPATIENT)
Dept: CT IMAGING | Facility: HOSPITAL | Age: 88
End: 2023-01-06

## 2023-01-12 ENCOUNTER — TELEPHONE (OUTPATIENT)
Dept: CARDIOLOGY | Facility: CLINIC | Age: 88
End: 2023-01-12
Payer: MEDICARE

## 2023-01-12 NOTE — TELEPHONE ENCOUNTER
Can you let her know fine to proceed with teeth cleaning.  No richard-procedure antibiotics are needed.  Should be fine to continue Xalerto but would defer to Dentist if this needs to be held.

## 2023-01-12 NOTE — TELEPHONE ENCOUNTER
Patient called and left . She has an apt to get her teeth cleaned at that end of this month. She is taking Xarleto 20 mg.     Is it ok if she have her teeth cleaned? Does she require any medications prior?    She can be reached [phone]

## 2023-01-13 ENCOUNTER — HOSPITAL ENCOUNTER (OUTPATIENT)
Dept: CT IMAGING | Facility: HOSPITAL | Age: 88
Discharge: HOME | End: 2023-01-13
Admitting: STUDENT IN AN ORGANIZED HEALTH CARE EDUCATION/TRAINING PROGRAM
Payer: MEDICARE

## 2023-01-13 DIAGNOSIS — R91.1 LUNG NODULE SEEN ON IMAGING STUDY: ICD-10-CM

## 2023-01-13 PROCEDURE — 71250 CT THORAX DX C-: CPT

## 2023-01-17 DIAGNOSIS — R91.1 LUNG NODULE: Primary | ICD-10-CM

## 2023-03-10 ENCOUNTER — OFFICE VISIT (OUTPATIENT)
Dept: CARDIOLOGY | Facility: CLINIC | Age: 88
End: 2023-03-10
Payer: MEDICARE

## 2023-03-10 VITALS — HEART RATE: 52 BPM

## 2023-03-10 DIAGNOSIS — Q21.12 PFO (PATENT FORAMEN OVALE): ICD-10-CM

## 2023-03-10 DIAGNOSIS — E78.00 HYPERCHOLESTEROLEMIA: Chronic | ICD-10-CM

## 2023-03-10 DIAGNOSIS — I27.82 CHRONIC PULMONARY EMBOLISM WITHOUT ACUTE COR PULMONALE, UNSPECIFIED PULMONARY EMBOLISM TYPE: Primary | ICD-10-CM

## 2023-03-10 DIAGNOSIS — I10 PRIMARY HYPERTENSION: Chronic | ICD-10-CM

## 2023-03-10 PROCEDURE — 99214 OFFICE O/P EST MOD 30 MIN: CPT | Performed by: NURSE PRACTITIONER

## 2023-03-10 PROCEDURE — 93000 ELECTROCARDIOGRAM COMPLETE: CPT | Performed by: NURSE PRACTITIONER

## 2023-03-10 RX ORDER — METOPROLOL TARTRATE 50 MG/1
50 TABLET, FILM COATED ORAL 2 TIMES DAILY
Qty: 180 TABLET | Refills: 3 | Status: SHIPPED | OUTPATIENT
Start: 2023-03-10

## 2023-03-10 RX ORDER — PRAVASTATIN SODIUM 10 MG
10 TABLET ORAL DAILY
Qty: 90 TABLET | Refills: 1 | Status: SHIPPED | OUTPATIENT
Start: 2023-03-10 | End: 2023-03-13

## 2023-03-10 RX ORDER — LOSARTAN POTASSIUM 50 MG/1
50 TABLET ORAL DAILY
Qty: 90 TABLET | Refills: 1 | Status: SHIPPED | OUTPATIENT
Start: 2023-03-10 | End: 2023-03-13

## 2023-03-10 NOTE — PROGRESS NOTES
[facility] Follow Up Office Note     Encounter Date:03/10/23  Patient:Eliane Rao  :1935  MRN:[id number]      Chief Complaint:   Chief Complaint   Patient presents with   • Follow-up         History of Presenting Illness:        Eliane Rao is a 87 y.o. female who is here for follow-up.  She is a patient of Dr Juarez.    Patient has past medical history that is significant for DVT and PE in the setting of factor V Leiden history, PFO, hypertension, mixed hyperlipidemia.    Patient was last seen by Dr. Juarez in 2022.  From a cardiac perspective she was doing well.  Echo demonstrated improvement in PA pressures.  Her beta-blocker was reduced due to bradycardia.  It was noted that she had seen her dermatologist for a rash or red spots on her arms and legs that is developed since starting apixaban.    Today patient reports she is not having chest pain or shortness of breath.  She wears compression hose daily.  She has mild swelling greater on the left side than right.  She denies bleeding concerns on Xarelto.    Review of Systems:  Review of Systems   Cardiovascular: Negative for chest pain, dyspnea on exertion, leg swelling, orthopnea and palpitations.   Respiratory: Negative for shortness of breath.        Current Outpatient Medications on File Prior to Visit   Medication Sig Dispense Refill   • amLODIPine (NORVASC) 5 MG tablet TAKE ONE TABLET BY MOUTH TWICE A DAY (Patient taking differently: Take 1 tablet by mouth Daily.) 180 tablet 1   • Lactobacillus Rhamnosus, GG, (CULTURELLE PO) Take  by mouth Daily.     • losartan (Cozaar) 50 MG tablet Take 1 tablet by mouth Daily. 30 tablet 2   • montelukast (SINGULAIR) 10 MG tablet Take 1 tablet by mouth Every Night. 90 tablet 1   • pravastatin (PRAVACHOL) 10 MG tablet Take 1 tablet by mouth Daily. 30 tablet 2   • rivaroxaban (XARELTO) 20 MG tablet Take 1 tablet by mouth Daily. 90 tablet 3   • [DISCONTINUED] metoprolol tartrate  (LOPRESSOR) 50 MG tablet Take 1 tablet by mouth 2 (Two) Times a Day. 180 tablet 3   • [DISCONTINUED] calcium carb-cholecalciferol 600-800 MG-UNIT tablet Take  by mouth.       No current facility-administered medications on file prior to visit.       Allergies   Allergen Reactions   • Sulfa Antibiotics Nausea Only and GI Intolerance       Past Medical History:   Diagnosis Date   • Allergic Sulpha-10 yrs ago   • Arthritis 7 yrs ago   • Asthma    • Cataract 10 yrs ago   • Chest pain    • Chronic renal insufficiency    • Deep vein thrombosis (HCC)    • Diastolic dysfunction     grade 1   • Diverticulosis    • Dyspnea    • Dysuria    • Hiatal hernia    • History of cataract    • Hyperlipidemia    • Hypertension    • IBS (irritable bowel syndrome)    • Insomnia    • Mitral regurgitation    • Multiple polyps of sigmoid colon    • Osteopenia    • Osteoporosis    • Patent foramen ovale    • Pulmonary embolism (HCC)    • Simple goiter    • Tricuspid regurgitation    • UTI (urinary tract infection)        Past Surgical History:   Procedure Laterality Date   • CATARACT EXTRACTION, BILATERAL     • COLONOSCOPY  2013   • EYE SURGERY  Cadracts removed       • TONSILLECTOMY     • TUBAL ABDOMINAL LIGATION         Social History     Socioeconomic History   • Marital status:    Tobacco Use   • Smoking status: Former     Packs/day: 0.50     Years: 10.00     Pack years: 5.00     Types: Cigarettes     Quit date: 1990     Years since quittin.2   • Smokeless tobacco: Never   • Tobacco comments:     Very light smoker beginning in college stopping in    Vaping Use   • Vaping Use: Never used   Substance and Sexual Activity   • Alcohol use: Yes     Alcohol/week: 4.0 standard drinks     Types: 4 Glasses of wine per week     Comment: Occasional/caffeine use   • Drug use: No   • Sexual activity: Not Currently     Partners: Male     Birth control/protection: Surgical       Family History   Problem Relation Age of  "Onset   • Cancer Mother         Mother-lung cancer   • Cancer Maternal Aunt         Aunt-bladder   • Diabetes Other        The following portions of the patient's history were reviewed and updated as appropriate: allergies, current medications, past family history, past medical history, past social history, past surgical history and problem list.       Objective:       Vitals:    03/10/23 1133   BP: (P) 140/89   BP Location: (P) Left arm   Patient Position: (P) Sitting   Cuff Size: (P) Adult   Pulse: 52   SpO2: (P) 97%   Weight: (P) 59.7 kg (131 lb 9.6 oz)   Height: (P) 157.5 cm (62\")         Physical Exam:  Constitutional: Well appearing, well developed, no acute distress   HENT: Oropharynx clear and membrane moist  Eyes: Normal conjunctiva, no sclera icterus  Neck: Supple, no carotid bruit bilaterally  Cardiovascular: Regular rate and rhythm, No Murmur, No bilateral lower extremity edema  Pulmonary: Normal respiratory effort, normal lung sounds, no wheezing  Neurological: Alert and orient x 3  Skin: Warm, dry, no ecchymosis, no rash  Psych: Appropriate mood and affect. Normal judgment and insight         Lab Results   Component Value Date     08/19/2022     02/18/2022    K 4.6 08/19/2022    K 4.7 02/18/2022     08/19/2022     02/18/2022    CO2 25 08/19/2022    CO2 23 02/18/2022    BUN 15 08/19/2022    BUN 25 02/18/2022    CREATININE 0.98 08/19/2022    CREATININE 1.05 (H) 02/18/2022    EGFRIFNONA 49 (L) 02/18/2022    EGFRIFNONA 54 (L) 08/09/2021    EGFRIFAFRI 56 (L) 02/18/2022    EGFRIFAFRI 66 08/09/2021    GLUCOSE 96 08/19/2022    GLUCOSE 97 02/18/2022    CALCIUM 9.4 08/19/2022    CALCIUM 9.4 02/18/2022    PROTENTOTREF 6.9 08/19/2022    PROTENTOTREF 7.0 02/18/2022    ALBUMIN 4.3 08/19/2022    ALBUMIN 4.5 02/18/2022    BILITOT 0.7 08/19/2022    BILITOT 0.6 02/18/2022    AST 18 08/19/2022    AST 22 02/18/2022    ALT 13 08/19/2022    ALT 13 02/18/2022     Lab Results   Component Value Date    " WBC 6.1 08/19/2022    WBC 6.58 05/22/2022    HGB 13.1 08/19/2022    HGB 11.1 (L) 05/22/2022    HCT 41.1 08/19/2022    HCT 34.5 (L) 05/22/2022    MCV 95 08/19/2022    MCV 94.5 05/22/2022     08/19/2022     05/22/2022     Lab Results   Component Value Date    CHOL 156 08/02/2019    CHOL 235 (H) 02/08/2019    TRIG 120 08/19/2022    TRIG 52 02/18/2022    HDL 71 08/19/2022    HDL 74 02/18/2022     (H) 08/19/2022    LDL 85 02/18/2022     No results found for: PROBNP, BNP  Lab Results   Component Value Date    TROPONINI 0.013 05/21/2022     Lab Results   Component Value Date    TSH 1.87 03/05/2018    TSH 2.640 07/06/2016           ECG 12 Lead    Date/Time: 3/10/2023 12:47 PM  Performed by: Sarah Abrams APRN  Authorized by: Sarah Abrams APRN   Comparison: compared with previous ECG from 9/1/2022  Similar to previous ECG  Rhythm: sinus rhythm  Rate: bradycardic  BPM: 52  ST Segments: ST segments normal  QRS axis: normal                 Assessment:          Diagnosis Plan   1. Chronic pulmonary embolism without acute cor pulmonale, unspecified pulmonary embolism type (HCC)  Adult Transthoracic Echo Complete w/ Color, Spectral and Contrast if Necessary Per Protocol    ECG 12 Lead      2. Primary hypertension        3. PFO (patent foramen ovale)               Plan:       Pulmonary emboli  · Improved PA pressures on most recent echocardiogram.  Repeat echocardiogram in September 2023  · Continue lifelong anticoagulation in setting of factor V Leiden    Active 5 Leiden  · Continue anticoagulation    Pulmonary hypertension  · Repeat echocardiogram prior to next visit    Essential hypertension  · BP is controlled.  Beta-blocker reduced due to low heart rate range    PFO  · Patient is anticoagulated  · No further intervention recommended    Mixed hyperlipidemia  · LDL high on most recent labs.  Discussed further with patient, she is on statin medication and has been recommended lifestyle  modification  · She will continue to monitor this with PCP    Patient is seen today for follow-up.  I think she is stable.  I am not recommending any changes.  Follow-up with Dr. Danielle mauricio in 6 months with echo prior to appointment.    Orders Placed This Encounter   Procedures   • ECG 12 Lead     This order was created via procedure documentation     Order Specific Question:   Release to patient     Answer:   Routine Release   • Adult Transthoracic Echo Complete w/ Color, Spectral and Contrast if Necessary Per Protocol     Standing Status:   Future     Standing Expiration Date:   3/10/2024     Order Specific Question:   Reason for exam?     Answer:   Heart Failure, Cardiomyopathy, or Sytemic or Pulmonary Hypertension     Order Specific Question:   Release to patient     Answer:   Routine Release            FEMI Garcia  [facility]  03/10/23  12:48 EST

## 2023-03-11 DIAGNOSIS — E78.00 HYPERCHOLESTEROLEMIA: Chronic | ICD-10-CM

## 2023-03-12 DIAGNOSIS — I10 PRIMARY HYPERTENSION: Chronic | ICD-10-CM

## 2023-03-13 RX ORDER — PRAVASTATIN SODIUM 10 MG
TABLET ORAL
Qty: 30 TABLET | Refills: 5 | Status: SHIPPED | OUTPATIENT
Start: 2023-03-13

## 2023-03-13 RX ORDER — LOSARTAN POTASSIUM 50 MG/1
TABLET ORAL
Qty: 30 TABLET | Refills: 2 | Status: SHIPPED | OUTPATIENT
Start: 2023-03-13

## 2023-03-16 ENCOUNTER — TELEPHONE (OUTPATIENT)
Dept: INTERNAL MEDICINE | Facility: CLINIC | Age: 88
End: 2023-03-16
Payer: MEDICARE

## 2023-03-16 NOTE — TELEPHONE ENCOUNTER
"  Caller: Eliane Rao \"Chantel\"    Relationship: Self    Best call back number: [phone]    What was the call regarding: PATIENT STATES SHE HAS QUESTIONS ABOUT THE losartan  AND SHE WOULD LIKE FOR DR BLANCAS TO GIVE HER A CALL BACK. PATIENT STATES THE ORIGINAL PRESCRIPTION WAS CALLED IN AS 25 MG FROM HER CARDIOLOGIST AND SHE RECENTLY GOT A REFILL FROM DR BLANCAS FOR 50 MG. PATIENT WOULD LIKE TO MAKE SURE THIS IS CORRECT.    PLEASE CALL AND ADVISE     "

## 2023-03-16 NOTE — TELEPHONE ENCOUNTER
I called patient back and let her know 50 mg was dose increased discussed at her appointment and she voiced understanding.  Blood pressure was 140/89 at her cardiology appointment today so she should be able to tolerate higher dose.

## 2023-03-27 ENCOUNTER — TELEPHONE (OUTPATIENT)
Dept: INTERNAL MEDICINE | Facility: CLINIC | Age: 88
End: 2023-03-27

## 2023-03-27 NOTE — TELEPHONE ENCOUNTER
"Caller: Eliane Rao LOYDA \"Chantel\"    Relationship: Self    Best call back number: [phone]    What is the best time to reach you: ANY TIME    Who are you requesting to speak with (clinical staff, provider,  specific staff member): DR. BLANCAS OR MA    What was the call regarding: PATIENT STATES SHE HAS TWISTED HER RIGHT FOOT AND WAS WANTING TO KNOW WHAT KIND OF MEDICATION SHE COULD TAKE FOR PAIN, WITH HER TAKING A BLOOD THINNER. SHE GOES TO SEE HER FOOT DOCTOR TOMORROW, 03/28/23.     Do you require a callback: YES        "

## 2023-04-17 RX ORDER — AMLODIPINE BESYLATE 5 MG/1
TABLET ORAL
Qty: 180 TABLET | Refills: 3 | Status: SHIPPED | OUTPATIENT
Start: 2023-04-17

## 2023-05-02 ENCOUNTER — TELEPHONE (OUTPATIENT)
Dept: INTERNAL MEDICINE | Facility: CLINIC | Age: 88
End: 2023-05-02

## 2023-05-02 NOTE — TELEPHONE ENCOUNTER
"  Caller: Eliane Rao \"Chantel\"    Relationship to patient: Self    Best call back number: [phone]    Patient is needing: PATIENT STATES THAT SHE IS HAVING LEG CRAMPS AT NIGHT AND WOULD LIKE TO KNOW WHAT WOULD BE BEST FOR HER TO TAKE. PLEASE REACH OUT AND ADVISE.           "

## 2023-05-05 ENCOUNTER — OFFICE VISIT (OUTPATIENT)
Dept: INTERNAL MEDICINE | Facility: CLINIC | Age: 88
End: 2023-05-05
Payer: MEDICARE

## 2023-05-05 VITALS
DIASTOLIC BLOOD PRESSURE: 69 MMHG | HEIGHT: 63 IN | HEART RATE: 75 BPM | BODY MASS INDEX: 22.68 KG/M2 | OXYGEN SATURATION: 97 % | SYSTOLIC BLOOD PRESSURE: 132 MMHG | WEIGHT: 128 LBS | TEMPERATURE: 98.2 F

## 2023-05-05 DIAGNOSIS — R25.2 LEG CRAMPS: Primary | ICD-10-CM

## 2023-05-05 NOTE — PROGRESS NOTES
Answers for HPI/ROS submitted by the patient on 5/3/2023  Please describe your symptoms.: Experiencing  leg cramps several times during the night  Have you had these symptoms before?: Yes  How long have you been having these symptoms?: 5-7 days  Please list any medications you are currently taking for this condition.: None  What is the primary reason for your visit?: Other      Addi Mojica M.D.  Internal Medicine  [address], [address]  [place] [address]  [phone]      Chief Complaint  Leg cramps  (Leg cramps at night /)    SUBJECTIVE    History of Present Illness    Eliane Rao is a 87 y.o. female with IBS, renal insuficiency, Chronic PE and Factor V Leiden who presents to the office today as an established patient that last saw me on 12/13/2022.     Here today with leg cramps for 1 week. Years ago she was on Lovastatin and had leg cramps. She was switched to pravastatin. The leg cramps have now recurred. Cramps only occur at night. She wakes up several times/night. She stands up and walks around and this helps. She denies restless legs. She is stretching to avoid. No increased activity level.    Review of Systems    Allergies   Allergen Reactions   • Sulfa Antibiotics Nausea Only and GI Intolerance        Outpatient Medications Marked as Taking for the 5/5/23 encounter (Office Visit) with Addi Mojica MD   Medication Sig Dispense Refill   • amLODIPine (NORVASC) 5 MG tablet TAKE ONE TABLET BY MOUTH TWICE A  tablet 3   • Lactobacillus Rhamnosus, GG, (CULTURELLE PO) Take  by mouth Daily.     • losartan (COZAAR) 50 MG tablet TAKE ONE TABLET BY MOUTH DAILY 30 tablet 2   • metoprolol tartrate (LOPRESSOR) 50 MG tablet Take 1 tablet by mouth 2 (Two) Times a Day. 180 tablet 3   • montelukast (SINGULAIR) 10 MG tablet Take 1 tablet by mouth Every Night. 90 tablet 1   • pravastatin (PRAVACHOL) 10 MG tablet TAKE ONE TABLET BY MOUTH DAILY 30 tablet 5   • rivaroxaban  "(XARELTO) 20 MG tablet Take 1 tablet by mouth Daily. 90 tablet 3        Past Medical History:   Diagnosis Date   • Allergic Sulpha-10 yrs ago   • Arthritis 7 yrs ago   • Asthma    • Cataract 10 yrs ago   • Chest pain    • Chronic renal insufficiency    • Deep vein thrombosis    • Diastolic dysfunction     grade 1   • Diverticulosis    • Dyspnea    • Dysuria    • Hiatal hernia    • History of cataract    • Hyperlipidemia    • Hypertension    • IBS (irritable bowel syndrome)    • Insomnia    • Mitral regurgitation    • Multiple polyps of sigmoid colon    • Osteopenia    • Osteoporosis    • Patent foramen ovale    • Pulmonary embolism    • Simple goiter    • Tricuspid regurgitation    • UTI (urinary tract infection)      Past Surgical History:   Procedure Laterality Date   • CATARACT EXTRACTION, BILATERAL  2009   • COLONOSCOPY  11/14/2013   • EYE SURGERY  Cadracts removed    2015   • TONSILLECTOMY     • TUBAL ABDOMINAL LIGATION       Family History   Problem Relation Age of Onset   • Cancer Mother         Mother-lung cancer   • Cancer Maternal Aunt         Aunt-bladder   • Diabetes Other     reports that she quit smoking about 33 years ago. Her smoking use included cigarettes. She has a 5.00 pack-year smoking history. She has never used smokeless tobacco. She reports current alcohol use of about 4.0 standard drinks per week. She reports that she does not use drugs.    OBJECTIVE    Vital Signs:   /69   Pulse 75   Temp 98.2 °F (36.8 °C)   Ht 160 cm (62.99\")   Wt 58.1 kg (128 lb)   SpO2 97%   BMI 22.68 kg/m²     Physical Exam  Constitutional:       General: She is not in acute distress.     Appearance: Normal appearance.   Pulmonary:      Effort: Pulmonary effort is normal. No respiratory distress.   Musculoskeletal:      Right lower leg: No edema.      Left lower leg: No edema.   Neurological:      Mental Status: She is alert. Mental status is at baseline.   Psychiatric:         Mood and Affect: Mood normal.  "        Behavior: Behavior normal.         Thought Content: Thought content normal.            The following data was reviewed by: Addi Mojica MD on 05/05/2023:  Common labs        5/22/2022    02:59 8/19/2022    10:21 5/5/2023    13:20   Common Labs   Glucose  96   98     BUN  15   27     Creatinine  0.98   1.01     Sodium  141   138     Potassium  4.6   4.4     Chloride  103   103     Calcium  9.4   9.8     Total Protein  6.9   6.8     Albumin  4.3   4.3     Total Bilirubin  0.7   0.9     Alkaline Phosphatase  74   67     AST (SGOT)  18   21     ALT (SGPT)  13   16     WBC 6.58      6.1      Hemoglobin 11.1      13.1      Hematocrit 34.5      41.1      Platelets 180      270      Total Cholesterol  254      Triglycerides  120      HDL Cholesterol  71      LDL Cholesterol   162          This result is from an external source.     Data reviewed: Note from last visit             ASSESSMENT & PLAN     Diagnoses and all orders for this visit:    1. Leg cramps (Primary)  -     Magnesium  -     Comprehensive Metabolic Panel    Discussed possible etiologies of leg cramps.  We will check for electrolyte deficiencies today.  Encouraged patient to stay hydrated and exercise moderate amount.  Discussed most etiologies of leg cramps are benign.  She has history of DVT but leg exam is normal today so low suspicion for this given only nocturnal symptoms.  Encouraged her to be very careful if she does get out of bed at night to relieve cramping to avoid falls.    Consider discontinuing statin if no improvement.  Would expect patient to have more daytime symptoms if this was myalgia from statin but she has had issues with statins in the past.      Health Maintenance Due   Topic Date Due   • MAMMOGRAM  08/02/2021        Follow Up  No follow-ups on file.    Patient/family had no further questions at this time and verbalized understanding of the plan discussed today.

## 2023-05-06 LAB
ALBUMIN SERPL-MCNC: 4.3 G/DL (ref 3.5–5.2)
ALBUMIN/GLOB SERPL: 1.7 G/DL
ALP SERPL-CCNC: 67 U/L (ref 39–117)
ALT SERPL-CCNC: 16 U/L (ref 1–33)
AST SERPL-CCNC: 21 U/L (ref 1–32)
BILIRUB SERPL-MCNC: 0.9 MG/DL (ref 0–1.2)
BUN SERPL-MCNC: 27 MG/DL (ref 8–23)
BUN/CREAT SERPL: 26.7 (ref 7–25)
CALCIUM SERPL-MCNC: 9.8 MG/DL (ref 8.6–10.5)
CHLORIDE SERPL-SCNC: 103 MMOL/L (ref 98–107)
CO2 SERPL-SCNC: 23.5 MMOL/L (ref 22–29)
CREAT SERPL-MCNC: 1.01 MG/DL (ref 0.57–1)
EGFRCR SERPLBLD CKD-EPI 2021: 54.3 ML/MIN/1.73
GLOBULIN SER CALC-MCNC: 2.5 GM/DL
GLUCOSE SERPL-MCNC: 98 MG/DL (ref 65–99)
MAGNESIUM SERPL-MCNC: 2.2 MG/DL (ref 1.6–2.4)
POTASSIUM SERPL-SCNC: 4.4 MMOL/L (ref 3.5–5.2)
PROT SERPL-MCNC: 6.8 G/DL (ref 6–8.5)
SODIUM SERPL-SCNC: 138 MMOL/L (ref 136–145)

## 2023-08-10 ENCOUNTER — TELEPHONE (OUTPATIENT)
Dept: INTERNAL MEDICINE | Facility: CLINIC | Age: 88
End: 2023-08-10
Payer: MEDICARE

## 2023-08-10 DIAGNOSIS — R35.0 FREQUENCY OF URINATION: Primary | ICD-10-CM

## 2023-08-10 LAB
BILIRUB BLD-MCNC: NEGATIVE MG/DL
CLARITY, POC: CLEAR
COLOR UR: YELLOW
EXPIRATION DATE: NORMAL
GLUCOSE UR STRIP-MCNC: NEGATIVE MG/DL
KETONES UR QL: NEGATIVE
LEUKOCYTE EST, POC: NEGATIVE
Lab: NORMAL
NITRITE UR-MCNC: NEGATIVE MG/ML
PH UR: 6 [PH] (ref 5–8)
PROT UR STRIP-MCNC: NEGATIVE MG/DL
RBC # UR STRIP: NEGATIVE /UL
SP GR UR: 1.02 (ref 1–1.03)
UROBILINOGEN UR QL: NORMAL

## 2023-08-10 PROCEDURE — 81003 URINALYSIS AUTO W/O SCOPE: CPT | Performed by: STUDENT IN AN ORGANIZED HEALTH CARE EDUCATION/TRAINING PROGRAM

## 2023-08-10 NOTE — PROGRESS NOTES
Her urinalysis looks normal.  Based on this she does not have infection.  If she wants to be seen earlier for dysuria I recommend she make an appointment here.

## 2023-08-10 NOTE — TELEPHONE ENCOUNTER
Patient was in to do labs and ask if she could leave urine because she was having frequent urination..   Dipped urine - all negative.   LVM for patient stating she does not have a UTI but if she continues to have symptoms she can come in for an appointment.

## 2023-08-22 ENCOUNTER — OFFICE VISIT (OUTPATIENT)
Dept: INTERNAL MEDICINE | Facility: CLINIC | Age: 88
End: 2023-08-22
Payer: MEDICARE

## 2023-08-22 ENCOUNTER — HOSPITAL ENCOUNTER (OUTPATIENT)
Dept: CARDIOLOGY | Facility: HOSPITAL | Age: 88
Discharge: HOME | End: 2023-08-22
Admitting: STUDENT IN AN ORGANIZED HEALTH CARE EDUCATION/TRAINING PROGRAM
Payer: MEDICARE

## 2023-08-22 VITALS
OXYGEN SATURATION: 94 % | BODY MASS INDEX: 23.21 KG/M2 | WEIGHT: 131 LBS | DIASTOLIC BLOOD PRESSURE: 72 MMHG | HEART RATE: 70 BPM | SYSTOLIC BLOOD PRESSURE: 118 MMHG | HEIGHT: 63 IN

## 2023-08-22 DIAGNOSIS — M79.89 LEG SWELLING: ICD-10-CM

## 2023-08-22 DIAGNOSIS — Z00.00 MEDICARE ANNUAL WELLNESS VISIT, SUBSEQUENT: Primary | ICD-10-CM

## 2023-08-22 DIAGNOSIS — I10 PRIMARY HYPERTENSION: Chronic | ICD-10-CM

## 2023-08-22 DIAGNOSIS — E78.00 HYPERCHOLESTEROLEMIA: Chronic | ICD-10-CM

## 2023-08-22 LAB
BH CV LOW VAS LEFT DISTAL FEMORAL SPONT: 1
BH CV LOW VAS LEFT LESSER SAPH VESSEL: 1
BH CV LOW VAS LEFT MID FEMORAL SPONT: 1
BH CV LOW VAS LEFT POPLITEAL SPONT: 1
BH CV LOWER VASCULAR LEFT COMMON FEMORAL AUGMENT: NORMAL
BH CV LOWER VASCULAR LEFT COMMON FEMORAL COMPETENT: NORMAL
BH CV LOWER VASCULAR LEFT COMMON FEMORAL COMPRESS: NORMAL
BH CV LOWER VASCULAR LEFT COMMON FEMORAL PHASIC: NORMAL
BH CV LOWER VASCULAR LEFT COMMON FEMORAL SPONT: NORMAL
BH CV LOWER VASCULAR LEFT DISTAL FEMORAL AUGMENT: NORMAL
BH CV LOWER VASCULAR LEFT DISTAL FEMORAL COMPETENT: NORMAL
BH CV LOWER VASCULAR LEFT DISTAL FEMORAL COMPRESS: NORMAL
BH CV LOWER VASCULAR LEFT DISTAL FEMORAL PHASIC: NORMAL
BH CV LOWER VASCULAR LEFT DISTAL FEMORAL SPONT: NORMAL
BH CV LOWER VASCULAR LEFT DISTAL FEMORAL THROMBUS: NORMAL
BH CV LOWER VASCULAR LEFT GASTRONEMIUS COMPRESS: NORMAL
BH CV LOWER VASCULAR LEFT GREATER SAPH AK COMPRESS: NORMAL
BH CV LOWER VASCULAR LEFT GREATER SAPH BK COMPRESS: NORMAL
BH CV LOWER VASCULAR LEFT LESSER SAPH COMPRESS: NORMAL
BH CV LOWER VASCULAR LEFT LESSER SAPH THROMBUS: NORMAL
BH CV LOWER VASCULAR LEFT MID FEMORAL AUGMENT: NORMAL
BH CV LOWER VASCULAR LEFT MID FEMORAL COMPETENT: NORMAL
BH CV LOWER VASCULAR LEFT MID FEMORAL COMPRESS: NORMAL
BH CV LOWER VASCULAR LEFT MID FEMORAL PHASIC: NORMAL
BH CV LOWER VASCULAR LEFT MID FEMORAL SPONT: NORMAL
BH CV LOWER VASCULAR LEFT MID FEMORAL THROMBUS: NORMAL
BH CV LOWER VASCULAR LEFT PERONEAL COMPRESS: NORMAL
BH CV LOWER VASCULAR LEFT POPLITEAL AUGMENT: NORMAL
BH CV LOWER VASCULAR LEFT POPLITEAL COMPETENT: NORMAL
BH CV LOWER VASCULAR LEFT POPLITEAL COMPRESS: NORMAL
BH CV LOWER VASCULAR LEFT POPLITEAL PHASIC: NORMAL
BH CV LOWER VASCULAR LEFT POPLITEAL SPONT: NORMAL
BH CV LOWER VASCULAR LEFT POPLITEAL THROMBUS: NORMAL
BH CV LOWER VASCULAR LEFT POSTERIOR TIBIAL COMPRESS: NORMAL
BH CV LOWER VASCULAR LEFT PROFUNDA FEMORAL COMPRESS: NORMAL
BH CV LOWER VASCULAR LEFT PROXIMAL FEMORAL COMPRESS: NORMAL
BH CV LOWER VASCULAR LEFT SAPHENOFEMORAL JUNCTION COMPRESS: NORMAL
BH CV LOWER VASCULAR LEFT SOLEAL COMPRESS: NORMAL
BH CV LOWER VASCULAR RIGHT COMMON FEMORAL AUGMENT: NORMAL
BH CV LOWER VASCULAR RIGHT COMMON FEMORAL COMPETENT: NORMAL
BH CV LOWER VASCULAR RIGHT COMMON FEMORAL COMPRESS: NORMAL
BH CV LOWER VASCULAR RIGHT COMMON FEMORAL PHASIC: NORMAL
BH CV LOWER VASCULAR RIGHT COMMON FEMORAL SPONT: NORMAL
BH CV VAS PRELIMINARY FINDINGS SCRIPTING: 1

## 2023-08-22 PROCEDURE — 93971 EXTREMITY STUDY: CPT

## 2023-08-22 PROCEDURE — 1170F FXNL STATUS ASSESSED: CPT | Performed by: STUDENT IN AN ORGANIZED HEALTH CARE EDUCATION/TRAINING PROGRAM

## 2023-08-22 PROCEDURE — G0439 PPPS, SUBSEQ VISIT: HCPCS | Performed by: STUDENT IN AN ORGANIZED HEALTH CARE EDUCATION/TRAINING PROGRAM

## 2023-08-22 NOTE — PROGRESS NOTES
The ABCs of the Annual Wellness Visit  Subsequent Medicare Wellness Visit    Chief Complaint   Patient presents with    Medicare Wellness-subsequent      Subjective    History of Present Illness:  Eliane Rao is a 88 y.o. female  with IBS, renal insuficiency, Chronic PE and Factor V Leiden  who presents for a Subsequent Medicare Wellness Visit.    The following portions of the patient's history were reviewed and   updated as appropriate: allergies, current medications, past family history, past medical history, past social history, past surgical history, and problem list    At last appointment she reported nocturnal leg cramps.  She has had leg cramps with statin in the past. She is interested in stopping     She has easy bleeding on Eliquis. Her left ankle is swelling for 1 week which is new. No leg swelling. No leg pain. No injury to ankle.     She has to use the restroom every morning. Urinates a couple times a night. She reed snot have polyuria during the day.     Compared to one year ago, the patient feels her physical   health is the same.    Compared to one year ago, the patient feels her mental   health is the same.    Recent Hospitalizations:  She was not admitted to the hospital during the last year.       Current Medical Providers:  Patient Care Team:  Addi Mojica MD as PCP - General (Internal Medicine)  Eduardo Goldberg MD as Consulting Physician (Gastroenterology)  Fuad Juarez MD as Consulting Physician (Cardiology)  Lee Sierra MD as Consulting Physician (Ophthalmology)    Outpatient Medications Prior to Visit   Medication Sig Dispense Refill    amLODIPine (NORVASC) 5 MG tablet TAKE ONE TABLET BY MOUTH TWICE A  tablet 3    Lactobacillus Rhamnosus, GG, (CULTURELLE PO) Take  by mouth Daily.      losartan (COZAAR) 50 MG tablet TAKE ONE TABLET BY MOUTH DAILY 30 tablet 2    metoprolol tartrate (LOPRESSOR) 50 MG tablet Take 1 tablet by mouth 2 (Two) Times a Day. 180 tablet 3     "montelukast (SINGULAIR) 10 MG tablet Take 1 tablet by mouth Every Night. 90 tablet 1    rivaroxaban (XARELTO) 20 MG tablet Take 1 tablet by mouth Daily. 90 tablet 3    pravastatin (PRAVACHOL) 10 MG tablet TAKE ONE TABLET BY MOUTH DAILY 30 tablet 5     No facility-administered medications prior to visit.       No opioid medication identified on active medication list. I have reviewed chart for other potential  high risk medication/s and harmful drug interactions in the elderly.        Aspirin is not on active medication list.  Aspirin use is contraindicated for this patient due to: current use of Xarelto.  .    Patient Active Problem List   Diagnosis    Hypercholesterolemia    Hypertension    Chronic renal insufficiency    IBS (irritable bowel syndrome)    PFO (patent foramen ovale)    Left hip pain    Fall    Allergy    Former smoker    Chronic pulmonary embolism without acute cor pulmonale    Factor 5 Leiden mutation, heterozygous     Advance Care Planning  Advance Directive is not on file.  ACP discussion was held with the patient during this visit. Patient has an advance directive (not in EMR), copy requested.          Objective    Vitals:    08/22/23 1304   BP: 118/72   Pulse: 70   SpO2: 94%   Weight: 59.4 kg (131 lb)   Height: 160 cm (62.99\")     Estimated body mass index is 23.21 kg/mý as calculated from the following:    Height as of this encounter: 160 cm (62.99\").    Weight as of this encounter: 59.4 kg (131 lb).    BMI is within normal parameters. No other follow-up for BMI required.      Does the patient have evidence of cognitive impairment? No    Physical Exam  Constitutional:       Appearance: Normal appearance. She is normal weight.   HENT:      Right Ear: Tympanic membrane normal.      Left Ear: Tympanic membrane normal.   Cardiovascular:      Rate and Rhythm: Normal rate and regular rhythm.      Heart sounds: Normal heart sounds. No murmur heard.  Pulmonary:      Effort: Pulmonary effort is normal. "      Breath sounds: Normal breath sounds.   Abdominal:      General: Abdomen is flat. There is no distension.      Palpations: Abdomen is soft.      Tenderness: There is no abdominal tenderness.   Musculoskeletal:      Right lower leg: No edema.      Left lower leg: No edema.   Skin:     General: Skin is warm and dry.   Neurological:      Mental Status: She is alert.   Psychiatric:         Mood and Affect: Mood normal.         Behavior: Behavior normal.         Thought Content: Thought content normal.     Lab Results   Component Value Date    CHLPL 219 (H) 08/10/2023    TRIG 43 08/10/2023    HDL 84 (H) 08/10/2023     (H) 08/10/2023    VLDL 8 08/10/2023            HEALTH RISK ASSESSMENT    Smoking Status:  Social History     Tobacco Use   Smoking Status Former    Packs/day: 0.50    Years: 10.00    Pack years: 5.00    Types: Cigarettes    Quit date: 1990    Years since quittin.6   Smokeless Tobacco Never   Tobacco Comments    Very light smoker beginning in college stopping in      Alcohol Consumption:  Social History     Substance and Sexual Activity   Alcohol Use Yes    Alcohol/week: 4.0 standard drinks    Types: 4 Glasses of wine per week    Comment: Occasional/caffeine use     Fall Risk Screen:    [facility] Fall Risk Assessment was completed, and patient is at HIGH risk for falls. Assessment completed on:2023    Depression Screenin/22/2023     1:09 PM   PHQ-2/PHQ-9 Depression Screening   Little Interest or Pleasure in Doing Things 0-->not at all   Feeling Down, Depressed or Hopeless 0-->not at all   PHQ-9: Brief Depression Severity Measure Score 0       Health Habits and Functional and Cognitive Screenin/22/2023     1:07 PM   Functional & Cognitive Status   Do you have difficulty preparing food and eating? No   Do you have difficulty bathing yourself, getting dressed or grooming yourself? No   Do you have difficulty using the toilet? No   Do you have difficulty moving  around from place to place? No   Do you have trouble with steps or getting out of a bed or a chair? No   Current Diet Well Balanced Diet   Dental Exam Up to date   Eye Exam Up to date   Exercise (times per week) 2 times per week   Current Exercises Include Walking   Do you need help using the phone?  No   Are you deaf or do you have serious difficulty hearing?  No   Do you need help to go to places out of walking distance? No   Do you need help shopping? No   Do you need help preparing meals?  No   Do you need help with housework?  No   Do you need help with laundry? No   Do you need help taking your medications? No   Do you need help managing money? No   Do you ever drive or ride in a car without wearing a seat belt? No   Have you felt unusual stress, anger or loneliness in the last month? No   Who do you live with? Alone   If you need help, do you have trouble finding someone available to you? No   Do you have difficulty concentrating, remembering or making decisions? No       Age-appropriate Screening Schedule:  Refer to the list below for future screening recommendations based on patient's age, sex and/or medical conditions. Orders for these recommended tests are listed in the plan section. The patient has been provided with a written plan.    Health Maintenance   Topic Date Due    COVID-19 Vaccine (5 - Moderna series) 02/11/2023    ANNUAL WELLNESS VISIT  08/19/2023    INFLUENZA VACCINE  10/01/2023    COLORECTAL CANCER SCREENING  02/04/2024    LIPID PANEL  08/10/2024    DXA SCAN  12/19/2024    TDAP/TD VACCINES (2 - Td or Tdap) 12/13/2032    Pneumococcal Vaccine 65+  Completed    ZOSTER VACCINE  Completed              Assessment & Plan   CMS Preventative Services Quick Reference  Risk Factors Identified During Encounter  None Identified  The above risks/problems have been discussed with the patient.  Follow up actions/plans if indicated are seen below in the Assessment/Plan Section.  Pertinent information has  been shared with the patient in the After Visit Summary.    Diagnoses and all orders for this visit:    1. Medicare annual wellness visit, subsequent (Primary)    2. Hypercholesterolemia    3. Primary hypertension    4. Leg swelling  -     Duplex Venous Lower Extremity - Left CAR      Will stop pravastatin given leg cramps.    Try taking medicine earlier in the day.    Given ankle swelling and history of PE on xarelto checking LE doppler.    Follow Up:   Return in about 6 months (around 2/22/2024) for Recheck.     An After Visit Summary and PPPS were made available to the patient.

## 2023-08-23 ENCOUNTER — TELEPHONE (OUTPATIENT)
Dept: INTERNAL MEDICINE | Facility: CLINIC | Age: 88
End: 2023-08-23
Payer: MEDICARE

## 2023-08-23 NOTE — TELEPHONE ENCOUNTER
Patient called wanting to speak with Dr. Mojica regarding her duplex ultrasound that was completed. She had a question about it.     Best call back # [phone]    Patient will be home until 1:30 pm today

## 2023-08-23 NOTE — TELEPHONE ENCOUNTER
I called Eliane Rao at [phone] at 13:01 EDT     Discussed the difference between acute and chronic DVT. All questions answered.

## 2023-08-25 RX ORDER — RIVAROXABAN 20 MG/1
TABLET, FILM COATED ORAL
Qty: 90 TABLET | Refills: 3 | Status: SHIPPED | OUTPATIENT
Start: 2023-08-25

## 2023-09-14 ENCOUNTER — OFFICE VISIT (OUTPATIENT)
Dept: CARDIOLOGY | Facility: CLINIC | Age: 88
End: 2023-09-14
Payer: MEDICARE

## 2023-09-14 ENCOUNTER — HOSPITAL ENCOUNTER (OUTPATIENT)
Dept: CARDIOLOGY | Facility: HOSPITAL | Age: 88
Discharge: HOME | End: 2023-09-14
Admitting: NURSE PRACTITIONER
Payer: MEDICARE

## 2023-09-14 VITALS
OXYGEN SATURATION: 92 % | SYSTOLIC BLOOD PRESSURE: 144 MMHG | BODY MASS INDEX: 24.11 KG/M2 | HEART RATE: 79 BPM | HEIGHT: 62 IN | DIASTOLIC BLOOD PRESSURE: 83 MMHG | WEIGHT: 131 LBS

## 2023-09-14 VITALS
SYSTOLIC BLOOD PRESSURE: 122 MMHG | DIASTOLIC BLOOD PRESSURE: 72 MMHG | BODY MASS INDEX: 23.42 KG/M2 | HEIGHT: 63 IN | HEART RATE: 64 BPM | WEIGHT: 132.2 LBS

## 2023-09-14 DIAGNOSIS — Q21.12 PFO (PATENT FORAMEN OVALE): ICD-10-CM

## 2023-09-14 DIAGNOSIS — I27.82 CHRONIC PULMONARY EMBOLISM WITHOUT ACUTE COR PULMONALE, UNSPECIFIED PULMONARY EMBOLISM TYPE: ICD-10-CM

## 2023-09-14 DIAGNOSIS — D68.51 FACTOR 5 LEIDEN MUTATION, HETEROZYGOUS: ICD-10-CM

## 2023-09-14 DIAGNOSIS — I10 PRIMARY HYPERTENSION: ICD-10-CM

## 2023-09-14 DIAGNOSIS — I27.20 PULMONARY HYPERTENSION: Primary | ICD-10-CM

## 2023-09-14 LAB
AORTIC ARCH: 2.4 CM
AORTIC DIMENSIONLESS INDEX: 0.9 (DI)
ASCENDING AORTA: 3.2 CM
BH CV ECHO MEAS - ACS: 1.36 CM
BH CV ECHO MEAS - AO MAX PG: 5.9 MMHG
BH CV ECHO MEAS - AO MEAN PG: 3 MMHG
BH CV ECHO MEAS - AO ROOT DIAM: 2.8 CM
BH CV ECHO MEAS - AO V2 MAX: 121 CM/SEC
BH CV ECHO MEAS - AO V2 VTI: 29.6 CM
BH CV ECHO MEAS - AVA(I,D): 2.27 CM2
BH CV ECHO MEAS - EDV(CUBED): 74.1 ML
BH CV ECHO MEAS - EDV(MOD-SP2): 54 ML
BH CV ECHO MEAS - EDV(MOD-SP4): 36 ML
BH CV ECHO MEAS - EF(MOD-BP): 65.8 %
BH CV ECHO MEAS - EF(MOD-SP2): 68.5 %
BH CV ECHO MEAS - EF(MOD-SP4): 61.1 %
BH CV ECHO MEAS - ESV(CUBED): 12.1 ML
BH CV ECHO MEAS - ESV(MOD-SP2): 17 ML
BH CV ECHO MEAS - ESV(MOD-SP4): 14 ML
BH CV ECHO MEAS - FS: 45.3 %
BH CV ECHO MEAS - IVS/LVPW: 1 CM
BH CV ECHO MEAS - IVSD: 0.8 CM
BH CV ECHO MEAS - LAT PEAK E' VEL: 7.5 CM/SEC
BH CV ECHO MEAS - LV DIASTOLIC VOL/BSA (35-75): 22.5 CM2
BH CV ECHO MEAS - LV MASS(C)D: 101.3 GRAMS
BH CV ECHO MEAS - LV MAX PG: 4.9 MMHG
BH CV ECHO MEAS - LV MEAN PG: 2.31 MMHG
BH CV ECHO MEAS - LV SYSTOLIC VOL/BSA (12-30): 8.8 CM2
BH CV ECHO MEAS - LV V1 MAX: 111.1 CM/SEC
BH CV ECHO MEAS - LV V1 VTI: 25.7 CM
BH CV ECHO MEAS - LVIDD: 4.2 CM
BH CV ECHO MEAS - LVIDS: 2.3 CM
BH CV ECHO MEAS - LVOT AREA: 2.6 CM2
BH CV ECHO MEAS - LVOT DIAM: 1.83 CM
BH CV ECHO MEAS - LVPWD: 0.8 CM
BH CV ECHO MEAS - MED PEAK E' VEL: 6.5 CM/SEC
BH CV ECHO MEAS - MR MAX PG: 74.3 MMHG
BH CV ECHO MEAS - MR MAX VEL: 431 CM/SEC
BH CV ECHO MEAS - MV A DUR: 0.15 SEC
BH CV ECHO MEAS - MV A MAX VEL: 67.3 CM/SEC
BH CV ECHO MEAS - MV DEC SLOPE: 335.9 CM/SEC2
BH CV ECHO MEAS - MV DEC TIME: 0.26 MSEC
BH CV ECHO MEAS - MV E MAX VEL: 87 CM/SEC
BH CV ECHO MEAS - MV E/A: 1.29
BH CV ECHO MEAS - MV MAX PG: 3.6 MMHG
BH CV ECHO MEAS - MV MEAN PG: 1.17 MMHG
BH CV ECHO MEAS - MV P1/2T: 81.7 MSEC
BH CV ECHO MEAS - MV V2 VTI: 34.5 CM
BH CV ECHO MEAS - MVA(P1/2T): 2.7 CM2
BH CV ECHO MEAS - MVA(VTI): 1.95 CM2
BH CV ECHO MEAS - PA ACC TIME: 0.15 SEC
BH CV ECHO MEAS - PA V2 MAX: 82.8 CM/SEC
BH CV ECHO MEAS - PULM A REVS DUR: 0.17 SEC
BH CV ECHO MEAS - PULM A REVS VEL: 32.1 CM/SEC
BH CV ECHO MEAS - PULM DIAS VEL: 41.2 CM/SEC
BH CV ECHO MEAS - PULM S/D: 1.05
BH CV ECHO MEAS - PULM SYS VEL: 43.4 CM/SEC
BH CV ECHO MEAS - QP/QS: 0.59
BH CV ECHO MEAS - RAP SYSTOLE: 3 MMHG
BH CV ECHO MEAS - RV MAX PG: 1.65 MMHG
BH CV ECHO MEAS - RV V1 MAX: 64.3 CM/SEC
BH CV ECHO MEAS - RV V1 VTI: 15.3 CM
BH CV ECHO MEAS - RVOT DIAM: 1.82 CM
BH CV ECHO MEAS - RVSP: 36 MMHG
BH CV ECHO MEAS - SI(MOD-SP2): 23.2 ML/M2
BH CV ECHO MEAS - SI(MOD-SP4): 13.8 ML/M2
BH CV ECHO MEAS - SUP REN AO DIAM: 1.8 CM
BH CV ECHO MEAS - SV(LVOT): 67.2 ML
BH CV ECHO MEAS - SV(MOD-SP2): 37 ML
BH CV ECHO MEAS - SV(MOD-SP4): 22 ML
BH CV ECHO MEAS - SV(RVOT): 39.7 ML
BH CV ECHO MEAS - TAPSE (>1.6): 2.16 CM
BH CV ECHO MEAS - TR MAX PG: 32.6 MMHG
BH CV ECHO MEAS - TR MAX VEL: 285.7 CM/SEC
BH CV ECHO MEASUREMENTS AVERAGE E/E' RATIO: 12.43
BH CV XLRA - RV BASE: 2.7 CM
BH CV XLRA - RV LENGTH: 7.1 CM
BH CV XLRA - RV MID: 1.99 CM
BH CV XLRA - TDI S': 9.6 CM/SEC
SINUS: 2.8 CM
STJ: 2.12 CM

## 2023-09-14 PROCEDURE — 1159F MED LIST DOCD IN RCRD: CPT | Performed by: INTERNAL MEDICINE

## 2023-09-14 PROCEDURE — 93000 ELECTROCARDIOGRAM COMPLETE: CPT | Performed by: INTERNAL MEDICINE

## 2023-09-14 PROCEDURE — 93306 TTE W/DOPPLER COMPLETE: CPT

## 2023-09-14 PROCEDURE — 1160F RVW MEDS BY RX/DR IN RCRD: CPT | Performed by: INTERNAL MEDICINE

## 2023-09-14 PROCEDURE — 99214 OFFICE O/P EST MOD 30 MIN: CPT | Performed by: INTERNAL MEDICINE

## 2023-09-14 NOTE — PROGRESS NOTES
[facility] Follow Up Office Note     Encounter Date:23  Patient:Eliane Rao  :1935  MRN:[id number]      Chief Complaint:   Chief Complaint   Patient presents with    Pulmonary Embolism     6 month f/u       History of Presenting Illness:      Ms. Rao is a 88 y.o. woman with past medical history notable for factor V Leiden, history of DVT/PE, patent foramen ovale, hypertension, mixed hyperlipidemia who presents to our office for scheduled follow up.  Overall patient is doing well no changes needed this time we will plan on seeing back in 6 months        Review of Systems:  Review of Systems   Constitutional: Negative.   HENT: Negative.     Eyes: Negative.    Cardiovascular:  Positive for leg swelling.   Respiratory: Negative.     Endocrine: Negative.    Hematologic/Lymphatic: Negative.    Skin: Negative.    Musculoskeletal: Negative.    Gastrointestinal: Negative.    Genitourinary: Negative.    Neurological: Negative.    Psychiatric/Behavioral: Negative.     Allergic/Immunologic: Negative.      Current Outpatient Medications on File Prior to Visit   Medication Sig Dispense Refill    amLODIPine (NORVASC) 5 MG tablet TAKE ONE TABLET BY MOUTH TWICE A DAY (Patient taking differently: Take 1 tablet by mouth Daily.) 180 tablet 3    Lactobacillus Rhamnosus, GG, (CULTURELLE PO) Take  by mouth Daily.      losartan (COZAAR) 50 MG tablet TAKE ONE TABLET BY MOUTH DAILY 30 tablet 2    metoprolol tartrate (LOPRESSOR) 50 MG tablet Take 1 tablet by mouth 2 (Two) Times a Day. 180 tablet 3    montelukast (SINGULAIR) 10 MG tablet Take 1 tablet by mouth Every Night. 90 tablet 1    rivaroxaban (Xarelto) 20 MG tablet TAKE ONE TABLET BY MOUTH DAILY 90 tablet 3     No current facility-administered medications on file prior to visit.        Allergies   Allergen Reactions    Sulfa Antibiotics Nausea Only and GI Intolerance       Past Medical History:   Diagnosis Date    Allergic Sulpha-10 yrs ago     Arthritis 7 yrs ago    Asthma     Cataract 10 yrs ago    Chest pain     Chronic renal insufficiency     Deep vein thrombosis     Diastolic dysfunction     grade 1    Diverticulosis     Dyspnea     Dysuria     Hiatal hernia     History of cataract     Hyperlipidemia     Hypertension     IBS (irritable bowel syndrome)     Insomnia     Mitral regurgitation     Multiple polyps of sigmoid colon     Osteopenia     Osteoporosis     Patent foramen ovale     Pulmonary embolism     Simple goiter     Tricuspid regurgitation     UTI (urinary tract infection)        Past Surgical History:   Procedure Laterality Date    CATARACT EXTRACTION, BILATERAL      COLONOSCOPY  2013    EYE SURGERY  Cadracts removed    2015    TONSILLECTOMY      TUBAL ABDOMINAL LIGATION         Social History     Socioeconomic History    Marital status:    Tobacco Use    Smoking status: Former     Packs/day: 0.50     Years: 10.00     Pack years: 5.00     Types: Cigarettes     Quit date: 1990     Years since quittin.7    Smokeless tobacco: Never    Tobacco comments:     Very light smoker beginning in college stopping in    Vaping Use    Vaping Use: Never used   Substance and Sexual Activity    Alcohol use: Yes     Alcohol/week: 4.0 standard drinks     Types: 4 Glasses of wine per week     Comment: Occasional/caffeine use    Drug use: No    Sexual activity: Not Currently     Partners: Male     Birth control/protection: Surgical       Family History   Problem Relation Age of Onset    Cancer Mother         Mother-lung cancer    Cancer Maternal Aunt         Aunt-bladder    Diabetes Other        The following portions of the patient's history were reviewed and updated as appropriate: allergies, current medications, past family history, past medical history, past social history, past surgical history and problem list.       Objective:       Vitals:    23 1118   BP: 122/72   BP Location: Left arm   Patient Position: Sitting  "  Pulse: 64   Weight: 60 kg (132 lb 3.2 oz)   Height: 160 cm (62.99\")     Body mass index is 23.43 kg/m².     Physical Exam:  Constitutional: Well appearing, Well-developed, No acute distress   HENT: Oropharynx clear and membrane moist  Eyes: Normal conjunctiva, no sclera icterus.  Neck: Supple, no carotid bruit bilaterally.  Cardiovascular: Regular rate and rhythm, No Murmur, Trace bilateral lower extremity edema.  Pulmonary: Normal respiratory effort, normal lung sounds, no wheezing.  Neurological: Alert and orient x 3.   Skin: Warm, dry, no ecchymosis, no rash.  Psych: Appropriate mood and affect. Normal judgment and insight.     Lab Results   Component Value Date    GLUCOSE 101 (H) 08/10/2023    BUN 22 08/10/2023    CREATININE 1.03 (H) 08/10/2023    EGFRIFNONA 49 (L) 02/18/2022    EGFRIFAFRI 56 (L) 02/18/2022    BCR 21.4 08/10/2023    K 4.6 08/10/2023    CO2 26.4 08/10/2023    CALCIUM 9.5 08/10/2023    PROTENTOTREF 6.3 08/10/2023    ALBUMIN 4.1 08/10/2023    LABIL2 1.9 08/10/2023    AST 20 08/10/2023    ALT 17 08/10/2023       Lab Results   Component Value Date    WBC 5.72 08/10/2023    HGB 12.9 08/10/2023    HCT 39.1 08/10/2023    MCV 94.9 08/10/2023     08/10/2023       Lab Results   Component Value Date    TROPONINI 0.013 05/21/2022       Lab Results   Component Value Date    CHOL 156 08/02/2019    CHOL 235 (H) 02/08/2019    CHOL 186 09/13/2018    CHLPL 219 (H) 08/10/2023    CHLPL 254 (H) 08/19/2022    CHLPL 170 02/18/2022     Lab Results   Component Value Date    TRIG 43 08/10/2023    TRIG 120 08/19/2022    TRIG 52 02/18/2022     Lab Results   Component Value Date    HDL 84 (H) 08/10/2023    HDL 71 08/19/2022    HDL 74 02/18/2022     Lab Results   Component Value Date     (H) 08/10/2023     (H) 08/19/2022    LDL 85 02/18/2022       Lab Results   Component Value Date    TSH 1.87 03/05/2018         ECG 12 Lead    Date/Time: 9/14/2023 11:38 AM  Performed by: Fuad Juarez, " MD  Authorized by: Fuad Juarez MD   Comparison: compared with previous ECG from 3/10/2023  Similar to previous ECG  Rhythm: sinus rhythm      Venous Duplex 8/22/2023:  Chronic left lower extremity deep vein thrombosis noted in the mid femoral, distal femoral and popliteal.  Chronic left lower extremity superficial thrombophlebitis noted in the small saphenous.  All other left sided veins appeared normal.    Echocardiogram 9/14/2023 with images reviewed by myself:  Normal LV function and no significant valvular abnormalities    Echocardiogram 9/1/2022:  Calculated left ventricular EF = 65.3% Estimated left ventricular EF was in agreement with the calculated left ventricular EF. Left ventricular systolic function is normal.  Left ventricular diastolic function is consistent with (grade III w/high LAP) reversible restrictive pattern.  There is calcification of the aortic valve.  Mild to moderate mitral valve regurgitation is present.  Moderate tricuspid valve regurgitation is present.  Estimated right ventricular systolic pressure from tricuspid regurgitation is mildly elevated (35-45 mmHg).    Echocardiogram 5/22/2022 [facility]:  The ejection fraction biplane was calculated at 63%. The left ventricular chamber size, wall thickness, and systolic function are within normal limits. There are no regional wall motion abnormalities observed. Abnormal left ventricular diastolic filling consistent with impaired relaxation. Mitral valve tissue Doppler E/E'' ratio consistent with normal left atrial pressures.  Mild mitral regurgitation is present.   Mild aortic leaflet calcification.   Mild tricuspid regurgitation.   Right ventricular systolic pressure of 62 mmHg.     Pulmonary emboli 5/21/2022 [facility]:  Multiple acute pulmonary emboli are noted located most proximally in the distal aspect of the right main pulmonary artery with involvement of multiple lobar, segmental, and subsegmental branches of the  bilateral upper lobe, right middle lobe, and bilateral lower lobes. The RV to LV ratio is less than 1.     Echocardiogram 11/12/2021:  Left ventricular ejection fraction appears to be 61 - 65%. Left ventricular systolic function is normal. Left ventricular diastolic function is consistent with (grade II w/high LAP) pseudonormalization  Normal right ventricular cavity size and systolic function noted.  The left atrial cavity is mild to moderately dilated.  Mild mitral valve regurgitation is present.  Mild tricuspid valve regurgitation is present  Calculated right ventricular systolic pressure from tricuspid regurgitation is 39.0 mmHg.  There is no evidence of pericardial effusion.        Assessment:          Diagnosis Plan   1. Pulmonary hypertension        2. Factor 5 Leiden mutation, heterozygous        3. PFO (patent foramen ovale)        4. Primary hypertension        5. Chronic pulmonary embolism without acute cor pulmonale, unspecified pulmonary embolism type  ECG 12 Lead               Plan:       Ms. Rao is a 88 y.o. woman with past medical history notable for factor V Leiden, history of DVT and PE, patent foramen ovale, hypertension, mixed hyperlipidemia who presents to our office for scheduled follow up.  Overall she is doing well from a cardiac perspective.  She has mild left leg swelling related to her chronic DVT but not terribly bothersome.  If it worsens we can have her see vascular but probably would hold off for now.  No changes are needed this time we will likely get an echocardiogram in another year we will screen for pulmonary hypertension for total of 4 years post PE      Pulmonary emboli:  Likely provoked PE and DVT however in the setting of known factor V Leiden would recommend lifelong anticoagulation  Repeat echocardiogram today show normal RV function and pulmonary pressure.    Factor V Leiden:  Continue anticoagulation    Pulmonary hypertension:  Echocardiogram today demonstrates  improvement in PA pressures  Follow-up echocardiogram in 12 months (Can stop screening 4 years post PE)    Hypertension:  Well controlled.    Patent foramen ovale:  Likely an incidental finding at this point given her advanced age      Follow-up:  6 Months      Thank you for allowing me to participate in the care of Eliane Rao. Feel free to contact me directly with any further questions or concerns.    Fuad Juarez MD  [facility]  09/14/23  11:52 EDT

## 2023-09-25 ENCOUNTER — OFFICE (OUTPATIENT)
Dept: URBAN - METROPOLITAN AREA CLINIC 66 | Facility: CLINIC | Age: 88
End: 2023-09-25

## 2023-09-25 ENCOUNTER — TRANSCRIBE ORDERS (OUTPATIENT)
Dept: ADMINISTRATIVE | Facility: HOSPITAL | Age: 88
End: 2023-09-25
Payer: MEDICARE

## 2023-09-25 VITALS
HEART RATE: 71 BPM | DIASTOLIC BLOOD PRESSURE: 75 MMHG | SYSTOLIC BLOOD PRESSURE: 129 MMHG | HEIGHT: 63 IN | WEIGHT: 131 LBS

## 2023-09-25 DIAGNOSIS — K52.832 LYMPHOCYTIC COLITIS: ICD-10-CM

## 2023-09-25 DIAGNOSIS — R19.7 DIARRHEA, UNSPECIFIED: ICD-10-CM

## 2023-09-25 DIAGNOSIS — R19.7 DIARRHEA OF PRESUMED INFECTIOUS ORIGIN: Primary | ICD-10-CM

## 2023-09-25 PROCEDURE — 99203 OFFICE O/P NEW LOW 30 MIN: CPT | Performed by: NURSE PRACTITIONER

## 2023-09-25 RX ORDER — COLESTIPOL HYDROCHLORIDE 1 G/1
TABLET, FILM COATED ORAL
Qty: 60 | Refills: 11 | Status: ACTIVE
Start: 2023-09-25

## 2023-09-26 ENCOUNTER — LAB (OUTPATIENT)
Dept: LAB | Facility: HOSPITAL | Age: 88
End: 2023-09-26
Payer: MEDICARE

## 2023-09-26 ENCOUNTER — TRANSCRIBE ORDERS (OUTPATIENT)
Dept: ADMINISTRATIVE | Facility: HOSPITAL | Age: 88
End: 2023-09-26
Payer: MEDICARE

## 2023-09-26 DIAGNOSIS — R19.7 DIARRHEA, UNSPECIFIED TYPE: ICD-10-CM

## 2023-09-26 DIAGNOSIS — K52.832 LYMPHOCYTIC COLITIS: ICD-10-CM

## 2023-09-26 DIAGNOSIS — K52.832 LYMPHOCYTIC COLITIS: Primary | ICD-10-CM

## 2023-09-26 LAB
ALBUMIN SERPL-MCNC: 4.3 G/DL (ref 3.5–5.2)
ALBUMIN/GLOB SERPL: 1.5 G/DL
ALP SERPL-CCNC: 62 U/L (ref 39–117)
ALT SERPL W P-5'-P-CCNC: 17 U/L (ref 1–33)
ANION GAP SERPL CALCULATED.3IONS-SCNC: 10.8 MMOL/L (ref 5–15)
AST SERPL-CCNC: 23 U/L (ref 1–32)
BASOPHILS # BLD AUTO: 0.03 10*3/MM3 (ref 0–0.2)
BASOPHILS NFR BLD AUTO: 0.4 % (ref 0–1.5)
BILIRUB SERPL-MCNC: 1.1 MG/DL (ref 0–1.2)
BUN SERPL-MCNC: 23 MG/DL (ref 8–23)
BUN/CREAT SERPL: 22.3 (ref 7–25)
CALCIUM SPEC-SCNC: 9.9 MG/DL (ref 8.6–10.5)
CHLORIDE SERPL-SCNC: 104 MMOL/L (ref 98–107)
CO2 SERPL-SCNC: 24.2 MMOL/L (ref 22–29)
CORTIS SERPL-MCNC: 11.9 MCG/DL
CREAT SERPL-MCNC: 1.03 MG/DL (ref 0.57–1)
DEPRECATED RDW RBC AUTO: 40.6 FL (ref 37–54)
EGFRCR SERPLBLD CKD-EPI 2021: 52.7 ML/MIN/1.73
EOSINOPHIL # BLD AUTO: 0.32 10*3/MM3 (ref 0–0.4)
EOSINOPHIL NFR BLD AUTO: 4.7 % (ref 0.3–6.2)
ERYTHROCYTE [DISTWIDTH] IN BLOOD BY AUTOMATED COUNT: 11.9 % (ref 12.3–15.4)
GLOBULIN UR ELPH-MCNC: 2.8 GM/DL
GLUCOSE SERPL-MCNC: 78 MG/DL (ref 65–99)
HCT VFR BLD AUTO: 38.7 % (ref 34–46.6)
HGB BLD-MCNC: 12.7 G/DL (ref 12–15.9)
IMM GRANULOCYTES # BLD AUTO: 0.02 10*3/MM3 (ref 0–0.05)
IMM GRANULOCYTES NFR BLD AUTO: 0.3 % (ref 0–0.5)
LYMPHOCYTES # BLD AUTO: 1.69 10*3/MM3 (ref 0.7–3.1)
LYMPHOCYTES NFR BLD AUTO: 24.8 % (ref 19.6–45.3)
MCH RBC QN AUTO: 31.1 PG (ref 26.6–33)
MCHC RBC AUTO-ENTMCNC: 32.8 G/DL (ref 31.5–35.7)
MCV RBC AUTO: 94.6 FL (ref 79–97)
MONOCYTES # BLD AUTO: 0.64 10*3/MM3 (ref 0.1–0.9)
MONOCYTES NFR BLD AUTO: 9.4 % (ref 5–12)
NEUTROPHILS NFR BLD AUTO: 4.11 10*3/MM3 (ref 1.7–7)
NEUTROPHILS NFR BLD AUTO: 60.4 % (ref 42.7–76)
NRBC BLD AUTO-RTO: 0 /100 WBC (ref 0–0.2)
PLATELET # BLD AUTO: 269 10*3/MM3 (ref 140–450)
PMV BLD AUTO: 10.9 FL (ref 6–12)
POTASSIUM SERPL-SCNC: 4.4 MMOL/L (ref 3.5–5.2)
PROT SERPL-MCNC: 7.1 G/DL (ref 6–8.5)
RBC # BLD AUTO: 4.09 10*6/MM3 (ref 3.77–5.28)
SODIUM SERPL-SCNC: 139 MMOL/L (ref 136–145)
WBC NRBC COR # BLD: 6.81 10*3/MM3 (ref 3.4–10.8)

## 2023-09-26 PROCEDURE — 82533 TOTAL CORTISOL: CPT

## 2023-09-26 PROCEDURE — 86364 TISS TRNSGLTMNASE EA IG CLAS: CPT

## 2023-09-26 PROCEDURE — 85025 COMPLETE CBC W/AUTO DIFF WBC: CPT

## 2023-09-26 PROCEDURE — 80053 COMPREHEN METABOLIC PANEL: CPT

## 2023-09-26 PROCEDURE — 36415 COLL VENOUS BLD VENIPUNCTURE: CPT

## 2023-09-27 ENCOUNTER — LAB (OUTPATIENT)
Dept: LAB | Facility: HOSPITAL | Age: 88
End: 2023-09-27
Payer: MEDICARE

## 2023-09-27 DIAGNOSIS — R19.7 DIARRHEA, UNSPECIFIED TYPE: ICD-10-CM

## 2023-09-27 DIAGNOSIS — K52.832 LYMPHOCYTIC COLITIS: ICD-10-CM

## 2023-09-27 LAB
ADV 40+41 DNA STL QL NAA+NON-PROBE: NOT DETECTED
ASTRO TYP 1-8 RNA STL QL NAA+NON-PROBE: NOT DETECTED
C CAYETANENSIS DNA STL QL NAA+NON-PROBE: NOT DETECTED
C COLI+JEJ+UPSA DNA STL QL NAA+NON-PROBE: NOT DETECTED
CRYPTOSP DNA STL QL NAA+NON-PROBE: NOT DETECTED
E HISTOLYT DNA STL QL NAA+NON-PROBE: NOT DETECTED
EAEC PAA PLAS AGGR+AATA ST NAA+NON-PRB: NOT DETECTED
EC STX1+STX2 GENES STL QL NAA+NON-PROBE: NOT DETECTED
EPEC EAE GENE STL QL NAA+NON-PROBE: NOT DETECTED
ETEC LTA+ST1A+ST1B TOX ST NAA+NON-PROBE: NOT DETECTED
G LAMBLIA DNA STL QL NAA+NON-PROBE: NOT DETECTED
LACTOFERRIN STL QL LA: POSITIVE
NOROVIRUS GI+II RNA STL QL NAA+NON-PROBE: NOT DETECTED
P SHIGELLOIDES DNA STL QL NAA+NON-PROBE: NOT DETECTED
RVA RNA STL QL NAA+NON-PROBE: NOT DETECTED
S ENT+BONG DNA STL QL NAA+NON-PROBE: NOT DETECTED
SAPO I+II+IV+V RNA STL QL NAA+NON-PROBE: NOT DETECTED
SHIGELLA SP+EIEC IPAH ST NAA+NON-PROBE: NOT DETECTED
TTG IGA SER-ACNC: <2 U/ML (ref 0–3)
V CHOL+PARA+VUL DNA STL QL NAA+NON-PROBE: NOT DETECTED
V CHOLERAE DNA STL QL NAA+NON-PROBE: NOT DETECTED
Y ENTEROCOL DNA STL QL NAA+NON-PROBE: NOT DETECTED

## 2023-09-27 PROCEDURE — 87045 FECES CULTURE AEROBIC BACT: CPT

## 2023-09-27 PROCEDURE — 87427 SHIGA-LIKE TOXIN AG IA: CPT

## 2023-09-27 PROCEDURE — 87507 IADNA-DNA/RNA PROBE TQ 12-25: CPT

## 2023-09-27 PROCEDURE — 83630 LACTOFERRIN FECAL (QUAL): CPT

## 2023-09-27 PROCEDURE — 87324 CLOSTRIDIUM AG IA: CPT

## 2023-09-27 PROCEDURE — 87046 STOOL CULTR AEROBIC BACT EA: CPT

## 2023-09-28 LAB — C DIFF TOX A+B STL QL IA: NEGATIVE

## 2023-10-01 LAB
BACTERIA SPEC CULT: NORMAL
BACTERIA SPEC CULT: NORMAL
CAMPYLOBACTER STL CULT: NORMAL
E COLI SXT STL QL IA: NEGATIVE
SALM + SHIG STL CULT: NORMAL

## 2023-10-04 ENCOUNTER — TELEHEALTH PROVIDED IN PATIENT'S HOME (OUTPATIENT)
Dept: URBAN - METROPOLITAN AREA TELEHEALTH 5 | Facility: TELEHEALTH | Age: 88
End: 2023-10-04

## 2023-10-04 VITALS — HEIGHT: 63 IN

## 2023-10-04 DIAGNOSIS — I10 PRIMARY HYPERTENSION: Chronic | ICD-10-CM

## 2023-10-04 DIAGNOSIS — K52.832 LYMPHOCYTIC COLITIS: ICD-10-CM

## 2023-10-04 DIAGNOSIS — R19.5 OTHER FECAL ABNORMALITIES: ICD-10-CM

## 2023-10-04 DIAGNOSIS — R19.7 DIARRHEA, UNSPECIFIED: ICD-10-CM

## 2023-10-04 PROCEDURE — 99441: CPT | Performed by: NURSE PRACTITIONER

## 2023-10-04 RX ORDER — LOSARTAN POTASSIUM 50 MG/1
50 TABLET ORAL DAILY
Qty: 90 TABLET | Refills: 2 | Status: SHIPPED | OUTPATIENT
Start: 2023-10-04

## 2024-01-08 ENCOUNTER — OFFICE (OUTPATIENT)
Dept: URBAN - METROPOLITAN AREA CLINIC 66 | Facility: CLINIC | Age: 89
End: 2024-01-08

## 2024-01-08 VITALS
DIASTOLIC BLOOD PRESSURE: 77 MMHG | HEART RATE: 74 BPM | SYSTOLIC BLOOD PRESSURE: 130 MMHG | HEIGHT: 63 IN | WEIGHT: 128 LBS

## 2024-01-08 DIAGNOSIS — K52.832 LYMPHOCYTIC COLITIS: ICD-10-CM

## 2024-01-08 DIAGNOSIS — R19.5 OTHER FECAL ABNORMALITIES: ICD-10-CM

## 2024-01-08 PROCEDURE — 99213 OFFICE O/P EST LOW 20 MIN: CPT | Performed by: NURSE PRACTITIONER

## 2024-01-17 ENCOUNTER — HOSPITAL ENCOUNTER (OUTPATIENT)
Dept: CT IMAGING | Facility: HOSPITAL | Age: 89
Discharge: HOME | End: 2024-01-17
Admitting: STUDENT IN AN ORGANIZED HEALTH CARE EDUCATION/TRAINING PROGRAM
Payer: MEDICARE

## 2024-01-17 DIAGNOSIS — R91.1 LUNG NODULE: ICD-10-CM

## 2024-01-17 PROCEDURE — 71250 CT THORAX DX C-: CPT

## 2024-01-18 ENCOUNTER — TELEPHONE (OUTPATIENT)
Dept: INTERNAL MEDICINE | Facility: CLINIC | Age: 89
End: 2024-01-18
Payer: MEDICARE

## 2024-01-18 DIAGNOSIS — R91.1 LUNG NODULE SEEN ON IMAGING STUDY: Primary | ICD-10-CM

## 2024-01-18 NOTE — TELEPHONE ENCOUNTER
I called Eliane Rao at [phone] at 17:34 EST     There was no answer so I left voicemail with office callback number.    CT Chest Without Contrast Diagnostic (01/17/2024 10:58)

## 2024-01-19 RX ORDER — PRAVASTATIN SODIUM 10 MG
10 TABLET ORAL EVERY OTHER DAY
Qty: 30 TABLET | Refills: 0 | Status: SHIPPED | OUTPATIENT
Start: 2024-01-19

## 2024-01-19 NOTE — TELEPHONE ENCOUNTER
I called Eliane SCALES Rao at [phone] at 17:27 EST     Discussed CT scan results.  Discussed what lung nodules are and reasons to follow-up.    Gets winded on exertion but states this is not out of the ordinary.     Discussed that she has coronary artery calcification.  She was previously statin intolerant.  She is already on anticoagulation so aspirin is contraindicated.  She is interested in trying a statin again.  Will try pravastatin every other day.   0 (no pain/absence of nonverbal indicators of pain)

## 2024-02-06 ENCOUNTER — OFFICE VISIT (OUTPATIENT)
Dept: INTERNAL MEDICINE | Facility: CLINIC | Age: 89
End: 2024-02-06
Payer: MEDICARE

## 2024-02-06 VITALS
HEART RATE: 70 BPM | HEIGHT: 63 IN | DIASTOLIC BLOOD PRESSURE: 68 MMHG | SYSTOLIC BLOOD PRESSURE: 120 MMHG | BODY MASS INDEX: 22.08 KG/M2 | WEIGHT: 124.6 LBS | OXYGEN SATURATION: 95 %

## 2024-02-06 DIAGNOSIS — R25.2 LEG CRAMPS: ICD-10-CM

## 2024-02-06 DIAGNOSIS — R91.1 LUNG NODULE: Primary | ICD-10-CM

## 2024-02-06 DIAGNOSIS — E78.5 HYPERLIPIDEMIA, UNSPECIFIED HYPERLIPIDEMIA TYPE: ICD-10-CM

## 2024-02-06 DIAGNOSIS — I10 PRIMARY HYPERTENSION: ICD-10-CM

## 2024-02-06 DIAGNOSIS — Z12.11 SCREENING FOR COLON CANCER: ICD-10-CM

## 2024-02-06 PROCEDURE — 99214 OFFICE O/P EST MOD 30 MIN: CPT | Performed by: STUDENT IN AN ORGANIZED HEALTH CARE EDUCATION/TRAINING PROGRAM

## 2024-02-06 NOTE — PROGRESS NOTES
Answers submitted by the patient for this visit:  Other (Submitted on 1/30/2024)  Please describe your symptoms.: Consultation with doctor regarding result test results....no symptons  Have you had these symptoms before?: No  How long have you been having these symptoms?: 1-4 days  Please list any medications you are currently taking for this condition.: None  Please describe any probable cause for these symptoms. : Do not know  Primary Reason for Visit (Submitted on 1/30/2024)  What is the primary reason for your visit?: Other    Addi Mojica M.D.  Internal Medicine  [facility]  [address], [address]  [place] [address]  [phone]      Chief Complaint  Hypertension (6 mth F/U & F/U on CXR results ///)    SUBJECTIVE    History of Present Illness    Eliane Rao is a 88 y.o. female with patent foramen ovale, hypertension, mixed hyperlipidemia, IBS, renal insuficiency, Chronic PE and Factor V Leiden who presents to the office today as an established patient that last saw me on 8/22/2023.     Discussed that she has coronary artery calcification.  She was previously statin intolerant.  She is already on anticoagulation so aspirin is contraindicated.  She is interested in trying a statin again.  Will try pravastatin every other day.     She is concerned about CT results of r lung nodule follow up. New 5 mm nodule in the left lower lobe and increased adjacent interstitial and groundglass opacity in the left lower lobe. These may be infectious/inflammatory and follow-up chest CT is recommended in about 3-6 months to ensure clearing. Stable additional findings    Has shortness of reath and cough but this is not new. Ishe is only short of breath with exertion. Has draiange. Cough is intermittent.     Leg cramps at night. Puts a bar of soap in her bed. Trying magnesium. Has to get out out of bed.     Uses the restrimm 2-3 times/night. No polyuria. A small ampout of urine each time. No  incontince/urgency.     Review of Systems    Allergies   Allergen Reactions    Sulfa Antibiotics Nausea Only and GI Intolerance        Outpatient Medications Marked as Taking for the 2/6/24 encounter (Office Visit) with Addi Mojica MD   Medication Sig Dispense Refill    amLODIPine (NORVASC) 5 MG tablet TAKE ONE TABLET BY MOUTH TWICE A DAY (Patient taking differently: Take 1 tablet by mouth Daily.) 180 tablet 3    Lactobacillus Rhamnosus, GG, (CULTURELLE PO) Take  by mouth Daily.      losartan (COZAAR) 50 MG tablet Take 1 tablet by mouth Daily. 90 tablet 2    metoprolol tartrate (LOPRESSOR) 50 MG tablet Take 1 tablet by mouth 2 (Two) Times a Day. 180 tablet 3    montelukast (SINGULAIR) 10 MG tablet Take 1 tablet by mouth Every Night. 90 tablet 1    pravastatin (PRAVACHOL) 10 MG tablet Take 1 tablet by mouth Every Other Day. 30 tablet 0    rivaroxaban (Xarelto) 20 MG tablet TAKE ONE TABLET BY MOUTH DAILY 90 tablet 3        Past Medical History:   Diagnosis Date    Allergic Sulpha-10 yrs ago    Arthritis 7 yrs ago    Asthma     Cataract 10 yrs ago    Chest pain     Chronic renal insufficiency     Deep vein thrombosis     Diastolic dysfunction     grade 1    Diverticulosis     Dyspnea     Dysuria     Hiatal hernia     History of cataract     Hyperlipidemia     Hypertension     IBS (irritable bowel syndrome)     Insomnia     Mitral regurgitation     Multiple polyps of sigmoid colon     Osteopenia     Osteoporosis     Patent foramen ovale     Pulmonary embolism     Simple goiter     Tricuspid regurgitation     UTI (urinary tract infection)      Past Surgical History:   Procedure Laterality Date    CATARACT EXTRACTION, BILATERAL  2009    COLONOSCOPY  11/14/2013    EYE SURGERY  Cadracts removed    2015    TONSILLECTOMY      TUBAL ABDOMINAL LIGATION       Family History   Problem Relation Age of Onset    Cancer Mother         Mother-lung cancer    Cancer Maternal Aunt         Aunt-bladder    Diabetes Other     reports that  "she quit smoking about 34 years ago. Her smoking use included cigarettes. She has a 5.00 pack-year smoking history. She has been exposed to tobacco smoke. She has never used smokeless tobacco. She reports current alcohol use of about 4.0 standard drinks of alcohol per week. She reports that she does not use drugs.    OBJECTIVE    Vital Signs:   /68   Pulse 70   Ht 160 cm (62.99\")   Wt 56.5 kg (124 lb 9.6 oz)   SpO2 95%   BMI 22.08 kg/m²     Physical Exam  Constitutional:       Appearance: Normal appearance. She is normal weight.   Cardiovascular:      Rate and Rhythm: Normal rate and regular rhythm.      Heart sounds: Normal heart sounds. No murmur heard.  Pulmonary:      Effort: Pulmonary effort is normal.      Breath sounds: Normal breath sounds.   Skin:     General: Skin is warm and dry.   Neurological:      Mental Status: She is alert.   Psychiatric:         Mood and Affect: Mood normal.         Behavior: Behavior normal.         Thought Content: Thought content normal.            The following data was reviewed by: Addi Mojica MD on 02/06/2024:  CMP          5/5/2023    13:20 8/10/2023    09:26 9/26/2023    14:28   CMP   Glucose 98  101  78    BUN 27  22  23    Creatinine 1.01  1.03  1.03    EGFR   52.7    Sodium 138  140  139    Potassium 4.4  4.6  4.4    Chloride 103  104  104    Calcium 9.8  9.5  9.9    Total Protein 6.8  6.3     Total Protein   7.1    Albumin 4.3  4.1  4.3    Globulin 2.5  2.2     Globulin   2.8    Total Bilirubin 0.9  0.7  1.1    Alkaline Phosphatase 67  69  62    AST (SGOT) 21  20  23    ALT (SGPT) 16  17  17    Albumin/Globulin Ratio   1.5    BUN/Creatinine Ratio 26.7  21.4  22.3    Anion Gap   10.8      CBC w/diff          8/10/2023    09:26 9/26/2023    14:28   CBC w/Diff   WBC 5.72  6.81    RBC 4.12  4.09    Hemoglobin 12.9  12.7    Hematocrit 39.1  38.7    MCV 94.9  94.6    MCH 31.3  31.1    MCHC 33.0  32.8    RDW 11.9  11.9    Platelets 248  269    Neutrophil Rel % 57.7  " 60.4    Immature Granulocyte Rel %  0.3    Lymphocyte Rel % 23.4  24.8    Monocyte Rel % 10.7  9.4    Eosinophil Rel % 7.2  4.7    Basophil Rel % 0.5  0.4      Lipid Panel          8/10/2023    09:26   Lipid Panel   Total Cholesterol 219    Triglycerides 43    HDL Cholesterol 84    VLDL Cholesterol 8    LDL Cholesterol  127    LDL/HDL Ratio 1.50          Data reviewed : Recent cardiology notes.        CT Chest Without Contrast Diagnostic (01/17/2024 10:58)       ASSESSMENT & PLAN     Diagnoses and all orders for this visit:    1. Lung nodule (Primary)    2. Leg cramps  -     Comprehensive Metabolic Panel; Future  -     CBC & Differential; Future  -     Lipid Panel; Future    3. Screening for colon cancer  -     Ambulatory Referral For Screening Colonoscopy    4. Primary hypertension  -     Comprehensive Metabolic Panel; Future  -     CBC & Differential; Future  -     Lipid Panel; Future    5. Hyperlipidemia, unspecified hyperlipidemia type  -     Lipid Panel; Future        Encouraged patient to follow up with repeat CT scan.  She is essentially not having any symptoms.    Blood pressure under good control today.  Continue current management.    Discussed adequate hydration, moderate exercise and magnesium supplementation for leg cramps.    Health Maintenance Due   Topic Date Due    RSV Vaccine - Adults >60 Years or Pregnant 32-36 Weeks (1 - 1-dose 60+ series) Never done    COVID-19 Vaccine (5 - 2023-24 season) 09/01/2023    COLORECTAL CANCER SCREENING  02/04/2024        Follow Up  Return in about 7 months (around 8/23/2024) for Medicare Wellness.    Patient/family had no further questions at this time and verbalized understanding of the plan discussed today.

## 2024-03-15 ENCOUNTER — OFFICE VISIT (OUTPATIENT)
Dept: CARDIOLOGY | Facility: CLINIC | Age: 89
End: 2024-03-15
Payer: MEDICARE

## 2024-03-15 VITALS
HEART RATE: 69 BPM | WEIGHT: 127 LBS | BODY MASS INDEX: 22.5 KG/M2 | DIASTOLIC BLOOD PRESSURE: 68 MMHG | SYSTOLIC BLOOD PRESSURE: 104 MMHG | HEIGHT: 63 IN | OXYGEN SATURATION: 96 %

## 2024-03-15 DIAGNOSIS — I27.20 PULMONARY HYPERTENSION: Primary | ICD-10-CM

## 2024-03-15 DIAGNOSIS — E78.00 HYPERCHOLESTEROLEMIA: Chronic | ICD-10-CM

## 2024-03-15 DIAGNOSIS — Q21.12 PFO (PATENT FORAMEN OVALE): ICD-10-CM

## 2024-03-15 DIAGNOSIS — I10 PRIMARY HYPERTENSION: Chronic | ICD-10-CM

## 2024-03-15 PROCEDURE — 99214 OFFICE O/P EST MOD 30 MIN: CPT | Performed by: NURSE PRACTITIONER

## 2024-03-15 PROCEDURE — 1159F MED LIST DOCD IN RCRD: CPT | Performed by: NURSE PRACTITIONER

## 2024-03-15 PROCEDURE — 1160F RVW MEDS BY RX/DR IN RCRD: CPT | Performed by: NURSE PRACTITIONER

## 2024-03-15 PROCEDURE — 93000 ELECTROCARDIOGRAM COMPLETE: CPT | Performed by: NURSE PRACTITIONER

## 2024-03-15 NOTE — PROGRESS NOTES
[facility] Follow Up Office Note     Encounter Date:03/15/24  Patient:Eliane Rao  :1935  MRN:[id number]      Chief Complaint:   No chief complaint on file.        History of Presenting Illness:        Eliane Rao is a 88 y.o. female who is here for follow-up.  She is a patient of Dr Juarez.    Patient has past medical history that is significant for DVT and PE in the setting of factor V Leiden history, PFO, hypertension, mixed hyperlipidemia.    Patient was last seen by Dr. Juarez in September at which point she was doing well and no changes were recommended.    Today patient reports she is doing well.  She is in physical therapy for back pain.  She is worried that something she does could upset her DVTs.  She is not having chest pain, dyspnea on exertion.  Her lower extremity edema is stable.  She does note more bruising.      Review of Systems:  Review of Systems   Cardiovascular:  Positive for leg swelling. Negative for chest pain, dyspnea on exertion, orthopnea and palpitations.   Respiratory:  Negative for shortness of breath.        Current Outpatient Medications on File Prior to Visit   Medication Sig Dispense Refill    amLODIPine (NORVASC) 5 MG tablet TAKE ONE TABLET BY MOUTH TWICE A DAY (Patient taking differently: Take 1 tablet by mouth Daily.) 180 tablet 3    Lactobacillus Rhamnosus, GG, (CULTURELLE PO) Take  by mouth Daily.      losartan (COZAAR) 50 MG tablet Take 1 tablet by mouth Daily. 90 tablet 2    metoprolol tartrate (LOPRESSOR) 50 MG tablet Take 1 tablet by mouth 2 (Two) Times a Day. 180 tablet 3    montelukast (SINGULAIR) 10 MG tablet Take 1 tablet by mouth Every Night. 90 tablet 1    pravastatin (PRAVACHOL) 10 MG tablet Take 1 tablet by mouth Every Other Day. 30 tablet 0    rivaroxaban (Xarelto) 20 MG tablet TAKE ONE TABLET BY MOUTH DAILY 90 tablet 3     No current facility-administered medications on file prior to visit.       Allergies   Allergen Reactions     Sulfa Antibiotics Nausea Only and GI Intolerance       Past Medical History:   Diagnosis Date    Allergic Sulpha-10 yrs ago    Arthritis 7 yrs ago    Asthma     Cataract 10 yrs ago    Chest pain     Chronic renal insufficiency     Deep vein thrombosis     Diastolic dysfunction     grade 1    Diverticulosis     Dyspnea     Dysuria     Hiatal hernia     History of cataract     Hyperlipidemia     Hypertension     IBS (irritable bowel syndrome)     Insomnia     Mitral regurgitation     Multiple polyps of sigmoid colon     Osteopenia     Osteoporosis     Patent foramen ovale     Pulmonary embolism     Simple goiter     Tricuspid regurgitation     UTI (urinary tract infection)        Past Surgical History:   Procedure Laterality Date    CATARACT EXTRACTION, BILATERAL      COLONOSCOPY  2013    EYE SURGERY  Cadracts removed        TONSILLECTOMY      TUBAL ABDOMINAL LIGATION         Social History     Socioeconomic History    Marital status:    Tobacco Use    Smoking status: Former     Current packs/day: 0.00     Average packs/day: 0.5 packs/day for 10.0 years (5.0 ttl pk-yrs)     Types: Cigarettes     Start date: 1980     Quit date: 1990     Years since quittin.2     Passive exposure: Past    Smokeless tobacco: Never    Tobacco comments:     Very light smoker beginning in college stopping in    Vaping Use    Vaping status: Never Used   Substance and Sexual Activity    Alcohol use: Yes     Alcohol/week: 4.0 standard drinks of alcohol     Types: 4 Glasses of wine per week     Comment: Occasional/caffeine use    Drug use: No    Sexual activity: Not Currently     Partners: Male     Birth control/protection: Surgical       Family History   Problem Relation Age of Onset    Cancer Mother         Mother-lung cancer    Cancer Maternal Aunt         Aunt-bladder    Diabetes Other        The following portions of the patient's history were reviewed and updated as appropriate: allergies,  "current medications, past family history, past medical history, past social history, past surgical history and problem list.       Objective:       Vitals:    03/15/24 1350   BP: 104/68   Pulse: 69   SpO2: 96%   Weight: 57.6 kg (127 lb)   Height: 160 cm (62.99\")         Physical Exam:  Constitutional: Pleasant and conversant.  Well appearing, no acute distress   HENT: Oropharynx clear and membrane moist  Eyes: Normal conjunctiva, no sclera icterus  Neck: Supple, no carotid bruit bilaterally  Cardiovascular: Regular rate and rhythm, No Murmur, No bilateral lower extremity edema  Pulmonary: Normal respiratory effort, normal lung sounds, no wheezing  Neurological: Alert and orient x 3  Skin: Warm, dry, no ecchymosis, no rash  Psych: Appropriate mood and affect. Normal judgment and insight         Lab Results   Component Value Date     09/26/2023     08/10/2023    K 4.4 09/26/2023    K 4.6 08/10/2023     09/26/2023     08/10/2023    CO2 24.2 09/26/2023    CO2 26.4 08/10/2023    BUN 23 09/26/2023    BUN 22 08/10/2023    CREATININE 1.03 (H) 09/26/2023    CREATININE 1.03 (H) 08/10/2023    EGFRIFNONA 49 (L) 02/18/2022    EGFRIFNONA 54 (L) 08/09/2021    EGFRIFAFRI >60 05/23/2022    EGFRIFAFRI >60 05/22/2022    GLUCOSE 78 09/26/2023    GLUCOSE 101 (H) 08/10/2023    CALCIUM 9.9 09/26/2023    CALCIUM 9.5 08/10/2023    PROTENTOTREF 6.3 08/10/2023    PROTENTOTREF 6.8 05/05/2023    ALBUMIN 4.3 09/26/2023    ALBUMIN 4.1 08/10/2023    BILITOT 1.1 09/26/2023    BILITOT 0.7 08/10/2023    AST 23 09/26/2023    AST 20 08/10/2023    ALT 17 09/26/2023    ALT 17 08/10/2023     Lab Results   Component Value Date    WBC 6.81 09/26/2023    WBC 5.72 08/10/2023    HGB 12.7 09/26/2023    HGB 12.9 08/10/2023    HCT 38.7 09/26/2023    HCT 39.1 08/10/2023    MCV 94.6 09/26/2023    MCV 94.9 08/10/2023     09/26/2023     08/10/2023     Lab Results   Component Value Date    CHOL 156 08/02/2019    CHOL 235 (H) " 02/08/2019    TRIG 43 08/10/2023    TRIG 120 08/19/2022    HDL 84 (H) 08/10/2023    HDL 71 08/19/2022     (H) 08/10/2023     (H) 08/19/2022     Lab Results   Component Value Date    .2 05/21/2022     Lab Results   Component Value Date    TROPONINI 0.013 05/21/2022     Lab Results   Component Value Date    TSH 1.87 03/05/2018    TSH 2.640 07/06/2016           ECG 12 Lead    Date/Time: 3/15/2024 2:06 PM  Performed by: Sarah Abrams APRN    Authorized by: Sarah Abrams APRN  Comparison: compared with previous ECG from 9/14/2023  Similar to previous ECG  Rhythm: sinus rhythm and sinus arrhythmia  Rate: normal  BPM: 60        Venous Duplex 8/22/2023:  Chronic left lower extremity deep vein thrombosis noted in the mid femoral, distal femoral and popliteal.  Chronic left lower extremity superficial thrombophlebitis noted in the small saphenous.  All other left sided veins appeared normal.     Echocardiogram 9/14/2023:  Normal LV function and no significant valvular abnormalities     Echocardiogram 9/1/2022:  Calculated left ventricular EF = 65.3% Estimated left ventricular EF was in agreement with the calculated left ventricular EF. Left ventricular systolic function is normal.  Left ventricular diastolic function is consistent with (grade III w/high LAP) reversible restrictive pattern.  There is calcification of the aortic valve.  Mild to moderate mitral valve regurgitation is present.  Moderate tricuspid valve regurgitation is present.  Estimated right ventricular systolic pressure from tricuspid regurgitation is mildly elevated (35-45 mmHg).     Echocardiogram 5/22/2022 [facility]:  The ejection fraction biplane was calculated at 63%. The left ventricular chamber size, wall thickness, and systolic function are within normal limits. There are no regional wall motion abnormalities observed. Abnormal left ventricular diastolic filling consistent with impaired relaxation. Mitral valve  tissue Doppler E/E'' ratio consistent with normal left atrial pressures.  Mild mitral regurgitation is present.   Mild aortic leaflet calcification.   Mild tricuspid regurgitation.   Right ventricular systolic pressure of 62 mmHg.      Pulmonary emboli 5/21/2022 [facility]:  Multiple acute pulmonary emboli are noted located most proximally in the distal aspect of the right main pulmonary artery with involvement of multiple lobar, segmental, and subsegmental branches of the bilateral upper lobe, right middle lobe, and bilateral lower lobes. The RV to LV ratio is less than 1.      Echocardiogram 11/12/2021:  Left ventricular ejection fraction appears to be 61 - 65%. Left ventricular systolic function is normal. Left ventricular diastolic function is consistent with (grade II w/high LAP) pseudonormalization  Normal right ventricular cavity size and systolic function noted.  The left atrial cavity is mild to moderately dilated.  Mild mitral valve regurgitation is present.  Mild tricuspid valve regurgitation is present  Calculated right ventricular systolic pressure from tricuspid regurgitation is 39.0 mmHg.  There is no evidence of pericardial effusion.     Assessment:          Diagnosis Plan   1. Pulmonary hypertension  ECG 12 Lead    Adult Transthoracic Echo Complete w/ Color, Spectral and Contrast if Necessary Per Protocol      2. PFO (patent foramen ovale)        3. Primary hypertension        4. Hypercholesterolemia                 Plan:       Pulmonary emboli  PA pressures normalized on last echo 9/2023  Continue lifelong anticoagulation in setting of factor V Leiden    Active 5 Leiden  Continue anticoagulation    Pulmonary hypertension   Echo 9/2023 with improved PA pressures.  Needs repeat echocardiogram in 6 months, 1 year from last echo.  After 4 years can stop screening post pulmonary embolism    Essential hypertension  BP is controlled.  Beta-blocker reduced due to low heart rate range    PFO  Patient  is anticoagulated  No further intervention recommended    Patient is seen today for follow-up.  I think she is stable from a cardiac perspective and I am not recommending any changes.  Follow-up in 6 months with echo at this time       Orders Placed This Encounter   Procedures    ECG 12 Lead     This order was created via procedure documentation     Order Specific Question:   Release to patient     Answer:   Routine Release [[id number]]    Adult Transthoracic Echo Complete w/ Color, Spectral and Contrast if Necessary Per Protocol     Standing Status:   Future     Standing Expiration Date:   3/15/2025     Order Specific Question:   Reason for exam?     Answer:   Heart Failure, Cardiomyopathy, or Sytemic or Pulmonary Hypertension     Order Specific Question:   Release to patient     Answer:   Routine Release [[id number]]            FEMI Garcia  [facility]  03/15/24  14:25 EDT

## 2024-03-20 RX ORDER — PRAVASTATIN SODIUM 10 MG
10 TABLET ORAL EVERY OTHER DAY
Qty: 30 TABLET | Refills: 0 | Status: SHIPPED | OUTPATIENT
Start: 2024-03-20

## 2024-04-05 ENCOUNTER — TRANSCRIBE ORDERS (OUTPATIENT)
Dept: ADMINISTRATIVE | Facility: HOSPITAL | Age: 89
End: 2024-04-05
Payer: MEDICARE

## 2024-04-05 DIAGNOSIS — M25.552 LEFT HIP PAIN: Primary | ICD-10-CM

## 2024-04-22 ENCOUNTER — HOSPITAL ENCOUNTER (OUTPATIENT)
Dept: CT IMAGING | Facility: HOSPITAL | Age: 89
Discharge: HOME | End: 2024-04-22
Admitting: STUDENT IN AN ORGANIZED HEALTH CARE EDUCATION/TRAINING PROGRAM
Payer: MEDICARE

## 2024-04-22 DIAGNOSIS — R91.1 LUNG NODULE SEEN ON IMAGING STUDY: ICD-10-CM

## 2024-04-22 PROCEDURE — 71250 CT THORAX DX C-: CPT

## 2024-04-24 DIAGNOSIS — R91.8 ABNORMAL CT SCAN OF LUNG: Primary | ICD-10-CM

## 2024-04-24 RX ORDER — METOPROLOL TARTRATE 50 MG/1
50 TABLET, FILM COATED ORAL 2 TIMES DAILY
Qty: 180 TABLET | Refills: 3 | Status: SHIPPED | OUTPATIENT
Start: 2024-04-24

## 2024-04-25 ENCOUNTER — TELEPHONE (OUTPATIENT)
Dept: INTERNAL MEDICINE | Facility: CLINIC | Age: 89
End: 2024-04-25

## 2024-04-25 NOTE — TELEPHONE ENCOUNTER
"    Caller: Eliane Rao \"Chantel\"    Relationship: Self    Best call back number: [phone]     Caller requesting test results: CHANTEL    What test was performed: CT SCAN    When was the test performed: 4/22/24    Where was the test performed: [facility]    Additional notes:           "

## 2024-04-25 NOTE — TELEPHONE ENCOUNTER
Spoke with patient and gave her Dr. Liu recommendations/instructions regarding the CT Scan results of her lungs. Pt verbalized understanding.

## 2024-04-29 ENCOUNTER — OFFICE VISIT (OUTPATIENT)
Dept: INTERNAL MEDICINE | Facility: CLINIC | Age: 89
End: 2024-04-29
Payer: MEDICARE

## 2024-04-29 VITALS
WEIGHT: 126.3 LBS | HEIGHT: 63 IN | DIASTOLIC BLOOD PRESSURE: 64 MMHG | TEMPERATURE: 98.5 F | BODY MASS INDEX: 22.38 KG/M2 | OXYGEN SATURATION: 97 % | SYSTOLIC BLOOD PRESSURE: 96 MMHG | HEART RATE: 76 BPM

## 2024-04-29 DIAGNOSIS — J01.90 ACUTE SINUSITIS, RECURRENCE NOT SPECIFIED, UNSPECIFIED LOCATION: ICD-10-CM

## 2024-04-29 DIAGNOSIS — R09.89 CHEST CONGESTION: ICD-10-CM

## 2024-04-29 DIAGNOSIS — J20.9 ACUTE BRONCHITIS, UNSPECIFIED ORGANISM: ICD-10-CM

## 2024-04-29 DIAGNOSIS — S81.801A LEG WOUND, RIGHT, INITIAL ENCOUNTER: ICD-10-CM

## 2024-04-29 DIAGNOSIS — R05.9 COUGH, UNSPECIFIED TYPE: Primary | ICD-10-CM

## 2024-04-29 LAB
EXPIRATION DATE: NORMAL
FLUAV AG UPPER RESP QL IA.RAPID: NOT DETECTED
FLUBV AG UPPER RESP QL IA.RAPID: NOT DETECTED
INTERNAL CONTROL: NORMAL
Lab: NORMAL
SARS-COV-2 AG UPPER RESP QL IA.RAPID: NOT DETECTED

## 2024-04-29 PROCEDURE — 99214 OFFICE O/P EST MOD 30 MIN: CPT | Performed by: INTERNAL MEDICINE

## 2024-04-29 PROCEDURE — 1159F MED LIST DOCD IN RCRD: CPT | Performed by: INTERNAL MEDICINE

## 2024-04-29 PROCEDURE — 1160F RVW MEDS BY RX/DR IN RCRD: CPT | Performed by: INTERNAL MEDICINE

## 2024-04-29 PROCEDURE — 87428 SARSCOV & INF VIR A&B AG IA: CPT | Performed by: INTERNAL MEDICINE

## 2024-04-29 RX ORDER — GINSENG 100 MG
1 CAPSULE ORAL DAILY
Start: 2024-04-29

## 2024-04-29 RX ORDER — DEXTROMETHORPHN/ACETAMINOPH/CP 10-325-2MG
1 TABLET ORAL EVERY 6 HOURS PRN
Start: 2024-04-29

## 2024-04-29 RX ORDER — DEXTROMETHORPHAN HYDROBROMIDE AND PROMETHAZINE HYDROCHLORIDE 15; 6.25 MG/5ML; MG/5ML
5 SYRUP ORAL NIGHTLY PRN
Qty: 240 ML | Refills: 0 | Status: SHIPPED | OUTPATIENT
Start: 2024-04-29

## 2024-04-29 RX ORDER — GUAIFENESIN 600 MG/1
600 TABLET, EXTENDED RELEASE ORAL 2 TIMES DAILY
Start: 2024-04-29

## 2024-04-29 RX ORDER — BENZONATATE 100 MG/1
100 CAPSULE ORAL 3 TIMES DAILY PRN
Qty: 30 CAPSULE | Refills: 1 | Status: SHIPPED | OUTPATIENT
Start: 2024-04-29

## 2024-04-29 NOTE — PROGRESS NOTES
"Cough (Productive) and Chest Pain      HPI  Eliane Rao is a 88 y.o. female RTC In acute care:   \"Well, I have had colds before but not like this'. Started with cough and congestion in chest 3 days.  Started with 'feeling like coming down with cold. Lot of phlegm. Blowing nose a lot'.  Sleep poor due to cough.  Nasal congestion present, but more so in chest.   NO SOA.  NO fevers, but 'had lots of chills' (took temp only once 'early on' and not since).    Worried well as cough was so severe.  Had CT scan recently and    Got new dx honeycombing on recent CT chest. Called pulmonary and has appt for ~2 months.    No prior COPD or asthma dx in past.     Meds: \"I have not taken anything\".      Notes about 3-1/2 weeks ago was pulling stockings off and her fingernail clipped right lower leg.  Had small skin flap that she replaced and then started putting Polysporin ointment on the area.  Is slow to heal.  No tenderness, no fluctuance and no drainage noted.  Wonders if needs to see \"a wound care specialist\".      Review of Systems   Constitutional: Positive for chills and malaise/fatigue. Negative for fever.   HENT:  Positive for congestion and sore throat (first day, resolved). Negative for ear discharge and ear pain.    Eyes:  Negative for discharge, pain and redness.   Cardiovascular:  Negative for dyspnea on exertion.   Respiratory:  Positive for cough, sputum production and wheezing ('maybe a little'). Negative for shortness of breath.    Skin:  Negative for rash and suspicious lesions.   Gastrointestinal:  Positive for diarrhea (baseline, variable sx). Negative for change in bowel habit, nausea and vomiting.       The following portions of the patient's history were reviewed and updated as appropriate: allergies, current medications, past medical history, past social history, and problem list.      Current Outpatient Medications:     amLODIPine (NORVASC) 5 MG tablet, TAKE ONE TABLET BY MOUTH TWICE A DAY (Patient " "taking differently: Take 1 tablet by mouth Daily.), Disp: 180 tablet, Rfl: 3    Lactobacillus Rhamnosus, GG, (CULTURELLE PO), Take  by mouth Daily., Disp: , Rfl:     losartan (COZAAR) 50 MG tablet, Take 1 tablet by mouth Daily., Disp: 90 tablet, Rfl: 2    metoprolol tartrate (LOPRESSOR) 50 MG tablet, TAKE ONE TABLET BY MOUTH TWICE A DAY, Disp: 180 tablet, Rfl: 3    montelukast (SINGULAIR) 10 MG tablet, Take 1 tablet by mouth Every Night., Disp: 90 tablet, Rfl: 1    pravastatin (PRAVACHOL) 10 MG tablet, TAKE 1 TABLET BY MOUTH EVERY OTHER DAY, Disp: 30 tablet, Rfl: 0    rivaroxaban (Xarelto) 20 MG tablet, TAKE ONE TABLET BY MOUTH DAILY, Disp: 90 tablet, Rfl: 3    benzonatate (TESSALON) 100 MG capsule, Take 1 capsule by mouth 3 (Three) Times a Day As Needed for Cough., Disp: 30 capsule, Rfl: 1    DM-APAP-CPM (Coricidin HBP) -2 MG tablet, Take 1 tablet by mouth Every 6 (Six) Hours As Needed (congestion). As directed on packaging., Disp: , Rfl:     guaiFENesin (Mucinex) 600 MG 12 hr tablet, Take 1 tablet by mouth 2 (Two) Times a Day., Disp: , Rfl:     promethazine-dextromethorphan (PROMETHAZINE-DM) 6.25-15 MG/5ML syrup, Take 5 mL by mouth At Night As Needed for Cough., Disp: 240 mL, Rfl: 0    Zinc 50 MG tablet, Take 1 tablet by mouth Daily., Disp: , Rfl:     Vitals:    04/29/24 1145   BP: 96/64   BP Location: Left arm   Patient Position: Sitting   Cuff Size: Adult   Pulse: 76   Temp: 98.5 °F (36.9 °C)   TempSrc: Oral   SpO2: 97%   Weight: 57.3 kg (126 lb 4.8 oz)   Height: 160 cm (62.99\")     Body mass index is 22.38 kg/m².      Physical Exam  Constitutional:       General: She is not in acute distress.     Appearance: Normal appearance. She is not ill-appearing or toxic-appearing.   HENT:      Head: Normocephalic and atraumatic.      Right Ear: External ear normal. There is no impacted cerumen (Not impacted cerumen exteriors TM on right).      Left Ear: Tympanic membrane, ear canal and external ear normal. There is " no impacted cerumen.      Nose: Nose normal. No nasal tenderness, mucosal edema or rhinorrhea.      Right Sinus: No maxillary sinus tenderness or frontal sinus tenderness.      Left Sinus: No maxillary sinus tenderness or frontal sinus tenderness.      Mouth/Throat:      Mouth: Mucous membranes are moist. No oral lesions.      Tongue: No lesions.      Palate: No lesions.      Pharynx: Oropharynx is clear. No pharyngeal swelling, oropharyngeal exudate or posterior oropharyngeal erythema.   Eyes:      General: No scleral icterus.     Extraocular Movements: Extraocular movements intact.      Conjunctiva/sclera: Conjunctivae normal.      Pupils: Pupils are equal, round, and reactive to light.   Cardiovascular:      Rate and Rhythm: Normal rate and regular rhythm.   Pulmonary:      Effort: Pulmonary effort is normal. No respiratory distress.      Breath sounds: No stridor. No wheezing or rales.      Comments: Coughs frequently during exam with loose mucus heard  Musculoskeletal:      Cervical back: Normal range of motion and neck supple.      Right lower leg: No edema.   Lymphadenopathy:      Cervical: No cervical adenopathy.   Skin:     Findings: Wound (Right lower inner leg, ~1cm diameter (see photo)) present.          Neurological:      General: No focal deficit present.      Mental Status: She is alert.      Cranial Nerves: No cranial nerve deficit.      Gait: Gait normal.   Psychiatric:         Behavior: Behavior normal.         Thought Content: Thought content normal.             CT chest 4/22/24:   1. The previously seen new sub-6 mm subpleural nodule in the lateral  base of the left lower lobe and patchy groundglass opacities in the base  of the left lower lobe have resolved in the interim.  2. Otherwise, stable likely benign sub-6 mm pulmonary nodules in the  left lung. Consider follow-up low-dose CT chest in 1 year if the patient  is at high risk.  3. Stable mild chronic peripheral interstitial thickening in  the lung  bases with suspected honeycombing in the right lung base in a UIP  pattern.  4. Moderate calcified coronary artery disease.  5. Tiny hiatal hernia.    Results for orders placed or performed in visit on 04/29/24   POCT SARS-CoV-2 + Flu Antigen TIMOTHY    Specimen: Swab   Result Value Ref Range    SARS Antigen Not Detected Not Detected, Presumptive Negative    Influenza A Antigen TIMOTHY Not Detected Not Detected    Influenza B Antigen TIMOTHY Not Detected Not Detected    Internal Control Passed Passed    Lot Number [id number]     Expiration Date 02/15/2025        Assessment/ Plan  Diagnoses and all orders for this visit:    Cough, unspecified type  -     POCT SARS-CoV-2 + Flu Antigen TIMOTHY  -     promethazine-dextromethorphan (PROMETHAZINE-DM) 6.25-15 MG/5ML syrup; Take 5 mL by mouth At Night As Needed for Cough.  -     benzonatate (TESSALON) 100 MG capsule; Take 1 capsule by mouth 3 (Three) Times a Day As Needed for Cough.    Acute bronchitis, unspecified organism  -     promethazine-dextromethorphan (PROMETHAZINE-DM) 6.25-15 MG/5ML syrup; Take 5 mL by mouth At Night As Needed for Cough.  -     guaiFENesin (Mucinex) 600 MG 12 hr tablet; Take 1 tablet by mouth 2 (Two) Times a Day.  -     benzonatate (TESSALON) 100 MG capsule; Take 1 capsule by mouth 3 (Three) Times a Day As Needed for Cough.  -     DM-APAP-CPM (Coricidin HBP) -2 MG tablet; Take 1 tablet by mouth Every 6 (Six) Hours As Needed (congestion). As directed on packaging.  -     Zinc 50 MG tablet; Take 1 tablet by mouth Daily.    Acute sinusitis, recurrence not specified, unspecified location  -     guaiFENesin (Mucinex) 600 MG 12 hr tablet; Take 1 tablet by mouth 2 (Two) Times a Day.  -     DM-APAP-CPM (Coricidin HBP) -2 MG tablet; Take 1 tablet by mouth Every 6 (Six) Hours As Needed (congestion). As directed on packaging.  -     Zinc 50 MG tablet; Take 1 tablet by mouth Daily.    Chest congestion  -     POCT SARS-CoV-2 + Flu Antigen TIMOTHY    Leg  wound, right, initial encounter        Return for Next scheduled follow up.      Discussion:  Eliane Rao is a 88 y.o. female with recent CT chest 4/22/2024 showing resolved new pulmonary nodule but 'Stable mild chronic peripheral interstitial thickening in the lung  bases with suspected honeycombing in the right lung base in a UIP pattern'  RTC in acute care (new patient and issue to examiner) with 4 days of acute bronchitis and sinusitis symptoms.  Flu/COVID(-) in office.  Exam largely benign today, though patient does cough frequently during exam.   Suspect viral etiology.  Reviewed conservative care regimen as noted above.  Track temperature curve.  Pt to call or RTC if T> 100.5, SOA, double sickness, or failure to improve.    Patient has pulmonary appointment for new honeycombing pattern and right lung base on CT, reviewed with patient.    Superficial right lower leg wound, fingernail versus skin.  Skin flap intact with scabbing, no induration, TTP or fluctuance.  Reassured patient, suspect some extended healing time given need for healing by secondary intention and also noted venous insufficiency in lower legs.  Do not suggest wound care evaluation necessary at this time.    RTC as planned

## 2024-05-03 RX ORDER — PRAVASTATIN SODIUM 10 MG
10 TABLET ORAL EVERY OTHER DAY
Qty: 90 TABLET | Refills: 0 | Status: SHIPPED | OUTPATIENT
Start: 2024-05-03

## 2024-05-21 ENCOUNTER — TELEPHONE (OUTPATIENT)
Dept: CARDIOLOGY | Facility: CLINIC | Age: 89
End: 2024-05-21
Payer: MEDICARE

## 2024-05-21 NOTE — TELEPHONE ENCOUNTER
Patient is having some swelling in her lower extremity. This has been going on for about two weeks now. She was in [place] last week but, she did wear her compression socks.      She denied any weight gain or SOB. She is not taking a water pill.     She can be reached at [phone]

## 2024-05-22 RX ORDER — FUROSEMIDE 20 MG/1
20 TABLET ORAL DAILY
Qty: 30 TABLET | Refills: 0 | Status: SHIPPED | OUTPATIENT
Start: 2024-05-22

## 2024-05-22 NOTE — TELEPHONE ENCOUNTER
I called and spoke to patient.  She does not think the swelling is that bad but it is unusual for her which bothers her.  She is not having increased shortness of breath.  I called in Lasix 20 mg and instructed her to take it for 3 to 4 days.  If swelling does not resolve or she has issues on the medication I would like for her to notify us.

## 2024-06-05 ENCOUNTER — TELEPHONE (OUTPATIENT)
Dept: INTERNAL MEDICINE | Facility: CLINIC | Age: 89
End: 2024-06-05
Payer: MEDICARE

## 2024-06-05 DIAGNOSIS — Z12.11 SCREENING FOR COLON CANCER: Primary | ICD-10-CM

## 2024-06-05 NOTE — TELEPHONE ENCOUNTER
"  Caller: Eliane Rao \"Chantel\"    Relationship: Self    Best call back number: [phone]     What is the medical concern/diagnosis: COLONOSCOPY     What specialty or service is being requested: GASTROLOGY     Any additional details: CURRENT GASTROLOGY DOCTOR HAS MOVED AND UNABLE TO GET IN WITH PROVIDER UNTIL END OF YEAR AND WANTS TO SWITCH         "

## 2024-06-10 DIAGNOSIS — J20.9 ACUTE BRONCHITIS, UNSPECIFIED ORGANISM: ICD-10-CM

## 2024-06-10 DIAGNOSIS — R05.9 COUGH, UNSPECIFIED TYPE: ICD-10-CM

## 2024-06-11 RX ORDER — BENZONATATE 100 MG/1
CAPSULE ORAL
Qty: 30 CAPSULE | Refills: 1 | Status: SHIPPED | OUTPATIENT
Start: 2024-06-11

## 2024-06-17 RX ORDER — AMLODIPINE BESYLATE 5 MG/1
TABLET ORAL
Qty: 180 TABLET | Refills: 3 | Status: SHIPPED | OUTPATIENT
Start: 2024-06-17

## 2024-06-17 RX ORDER — FUROSEMIDE 20 MG/1
20 TABLET ORAL DAILY
Qty: 30 TABLET | Refills: 0 | Status: SHIPPED | OUTPATIENT
Start: 2024-06-17

## 2024-06-25 ENCOUNTER — TELEPHONE (OUTPATIENT)
Dept: GASTROENTEROLOGY | Facility: CLINIC | Age: 89
End: 2024-06-25
Payer: MEDICARE

## 2024-06-25 NOTE — TELEPHONE ENCOUNTER
Patient on anticoagulation for history of DVT and pulmonary emboli.  These were provoked with a long travel and cruise however also positive for factor V Leiden.  This occurred in 2022.  She is safe to hold her Xarelto for 48 hours for her procedure without any bridging.  If needing to hold for longer durations please let me know.  Would restart coagulation post procedure. Thank you for reaching out

## 2024-06-25 NOTE — TELEPHONE ENCOUNTER
LAST C/S  2/4/19   IN EPIC    PERSONAL HX OF POLYPS    NO FAMILY HX OF POLYPS    NO FAMILY HX OF COLON CA            LIST OF  MEDICATIONS    XARELTO  AMLODIPINE  LOSARTAN  METOPROLOL  MONTELUKAST            OA QUESTIONNAIRE SCANNED IN MEDIA

## 2024-06-26 DIAGNOSIS — I10 PRIMARY HYPERTENSION: Chronic | ICD-10-CM

## 2024-06-26 RX ORDER — LOSARTAN POTASSIUM 50 MG/1
50 TABLET ORAL DAILY
Qty: 90 TABLET | Refills: 2 | Status: SHIPPED | OUTPATIENT
Start: 2024-06-26

## 2024-06-26 NOTE — TELEPHONE ENCOUNTER
OK FOR HUB TO RELAY  CALLED PT AND LVM ADVISED PT PER DR. REED PT DOES NOT NEED A COLONOSCOPY AT THIS TIME. HER LAST ONE WAS IN 2019 WITH DR. JUARES AND WAS NORMAL. I DID LET THE PT KNOW THAT IF SHE IS HAVING ANY ISSUES OR IS NEEDING TO BE SEEN TO FEEL FREE AND CALL OUR OFFICE AT [phone] OPT. 1.

## 2024-06-27 ENCOUNTER — TELEPHONE (OUTPATIENT)
Dept: GASTROENTEROLOGY | Facility: CLINIC | Age: 89
End: 2024-06-27
Payer: MEDICARE

## 2024-06-27 NOTE — TELEPHONE ENCOUNTER
Provider: DR BETH REED     Caller: BRICE HERNANDEZ    Relationship to Patient: SELF    Phone Number: [phone]    Reason for Call: PT IS CALLING DMITRY CAMPBELL BACK PLEASE ADVISE PLEASE CALL AFTER 3 PM

## 2024-08-01 ENCOUNTER — OFFICE VISIT (OUTPATIENT)
Dept: GASTROENTEROLOGY | Facility: CLINIC | Age: 89
End: 2024-08-01
Payer: MEDICARE

## 2024-08-01 VITALS
HEIGHT: 63 IN | DIASTOLIC BLOOD PRESSURE: 81 MMHG | SYSTOLIC BLOOD PRESSURE: 129 MMHG | HEART RATE: 77 BPM | WEIGHT: 127.2 LBS | TEMPERATURE: 97.6 F | BODY MASS INDEX: 22.54 KG/M2

## 2024-08-01 DIAGNOSIS — R19.7 DIARRHEA, UNSPECIFIED TYPE: Primary | ICD-10-CM

## 2024-08-01 DIAGNOSIS — K52.832 LYMPHOCYTIC COLITIS: ICD-10-CM

## 2024-08-01 LAB
ALBUMIN SERPL-MCNC: 4.3 G/DL (ref 3.5–5.2)
ALBUMIN/GLOB SERPL: 1.9 G/DL
ALP SERPL-CCNC: 75 U/L (ref 39–117)
ALT SERPL-CCNC: 12 U/L (ref 1–33)
AST SERPL-CCNC: 20 U/L (ref 1–32)
BILIRUB SERPL-MCNC: 1 MG/DL (ref 0–1.2)
BUN SERPL-MCNC: 21 MG/DL (ref 8–23)
BUN/CREAT SERPL: 21.2 (ref 7–25)
CALCIUM SERPL-MCNC: 9.5 MG/DL (ref 8.6–10.5)
CHLORIDE SERPL-SCNC: 107 MMOL/L (ref 98–107)
CO2 SERPL-SCNC: 25.6 MMOL/L (ref 22–29)
CREAT SERPL-MCNC: 0.99 MG/DL (ref 0.57–1)
CRP SERPL-MCNC: <0.3 MG/DL (ref 0–0.5)
EGFRCR SERPLBLD CKD-EPI 2021: 55.3 ML/MIN/1.73
ERYTHROCYTE [DISTWIDTH] IN BLOOD BY AUTOMATED COUNT: 13 % (ref 12.3–15.4)
GLOBULIN SER CALC-MCNC: 2.3 GM/DL
GLUCOSE SERPL-MCNC: 91 MG/DL (ref 65–99)
HCT VFR BLD AUTO: 39.2 % (ref 34–46.6)
HGB BLD-MCNC: 12.6 G/DL (ref 12–15.9)
MCH RBC QN AUTO: 30.6 PG (ref 26.6–33)
MCHC RBC AUTO-ENTMCNC: 32.1 G/DL (ref 31.5–35.7)
MCV RBC AUTO: 95.1 FL (ref 79–97)
PLATELET # BLD AUTO: 234 10*3/MM3 (ref 140–450)
POTASSIUM SERPL-SCNC: 4.6 MMOL/L (ref 3.5–5.2)
PROT SERPL-MCNC: 6.6 G/DL (ref 6–8.5)
RBC # BLD AUTO: 4.12 10*6/MM3 (ref 3.77–5.28)
SODIUM SERPL-SCNC: 141 MMOL/L (ref 136–145)
WBC # BLD AUTO: 4.49 10*3/MM3 (ref 3.4–10.8)

## 2024-08-01 NOTE — PROGRESS NOTES
"Chief Complaint  Diarrhea and Irritable Bowel Syndrome    Subjective          History Of Present Illness:    Eliane Rao is a  88 y.o. female h patent foramen ovale, hypertension, mixed hyperlipidemia, IBS, renal insuficiency, Chronic PE and Factor V Leiden on Xarelto who presents as a new patient who presents for evaluation of diarrhea.     Patient reports the diarrhea for the last couple of months.  Patient scribes her stool as loose watery, primarily in the mornings.  She denies any abdominal pain, melena, hematochezia.  Patient was given colestipol by her PCP but she was unable to tolerate the size of the pills.  She denies any nausea, vomiting, poor appetite, unintentional weight loss.  She did have stool studies at the end of last year for similar symptoms which included a negative stool culture, GI PCR, celiac panel, C. difficile toxin.  She did have a positive fecal lactoferrin.    Based on prior biopsies with Dr. Goldberg patient does have a history of lymphocytic colitis.  She does have a history of colon polyps.    Last colonoscopy was in 2019 -diverticulosis of the descending and sigmoid colon.  She did have findings suggestive of nonspecific colitis.  Pathology was consistent with chronic unspecified colitis.    Objective   Vital Signs:   /81 (BP Location: Right arm, Patient Position: Sitting, Cuff Size: Adult)   Pulse 77   Temp 97.6 °F (36.4 °C)   Ht 160 cm (62.99\")   Wt 57.7 kg (127 lb 3.2 oz)   BMI 22.54 kg/m²       Physical Exam  Vitals reviewed.   Constitutional:       General: She is not in acute distress.     Appearance: Normal appearance. She is not ill-appearing.   HENT:      Head: Normocephalic and atraumatic.      Nose: Nose normal.      Mouth/Throat:      Pharynx: Oropharynx is clear.   Eyes:      Conjunctiva/sclera: Conjunctivae normal.   Pulmonary:      Effort: Pulmonary effort is normal.   Abdominal:      General: Abdomen is flat. Bowel sounds are normal. There is no " distension.      Palpations: Abdomen is soft. There is no mass.      Tenderness: There is no abdominal tenderness.   Musculoskeletal:         General: No swelling. Normal range of motion.      Cervical back: Normal range of motion.   Skin:     General: Skin is warm and dry.      Findings: No bruising or rash.   Neurological:      General: No focal deficit present.      Mental Status: She is alert and oriented to person, place, and time.      Motor: No weakness.      Gait: Gait normal.   Psychiatric:         Mood and Affect: Mood normal.          Result Review :   The following data was reviewed by: Shelby Valadez PA-C on 08/01/2024:  CMP          8/10/2023    09:26 9/26/2023    14:28   CMP   Glucose 101  78    BUN 22  23    Creatinine 1.03  1.03    EGFR  52.7    Sodium 140  139    Potassium 4.6  4.4    Chloride 104  104    Calcium 9.5  9.9    Total Protein 6.3     Total Protein  7.1    Albumin 4.1  4.3    Globulin 2.2     Globulin  2.8    Total Bilirubin 0.7  1.1    Alkaline Phosphatase 69  62    AST (SGOT) 20  23    ALT (SGPT) 17  17    Albumin/Globulin Ratio  1.5    BUN/Creatinine Ratio 21.4  22.3    Anion Gap  10.8      CBC          8/10/2023    09:26 9/26/2023    14:28   CBC   WBC 5.72  6.81    RBC 4.12  4.09    Hemoglobin 12.9  12.7    Hematocrit 39.1  38.7    MCV 94.9  94.6    MCH 31.3  31.1    MCHC 33.0  32.8    RDW 11.9  11.9    Platelets 248  269            Assessment and Plan    Diagnoses and all orders for this visit:    1. Diarrhea, unspecified type (Primary)  -     Calprotectin, Fecal - Stool, Per Rectum  -     Fecal Fat, Qualitative - Stool, Per Rectum  -     Pancreatic Elastase, Fecal - Stool, Per Rectum  -     Comprehensive Metabolic Panel  -     CBC (No Diff)  -     C-reactive Protein  -     TSH Rfx On Abnormal To Free T4    2. Lymphocytic colitis       Largely suspicious for recurrence of lymphocytic colitis.  Due to the chronicity and recurrence, infection is unlikely. Will check fecal  calprotectin and additional stool studies  Check updated labs in the setting of diarrhea  Increase Pepto-Bismol to 2 chewable tablets 3 times daily.    Follow Up   Return in about 2 months (around 10/1/2024) for Shelby Rodarte PA-C.    Dragon dictation used throughout this note.            Shelby Rodarte PA-C   [facility]  [address] [place] [address]  Office: [phone]

## 2024-08-02 ENCOUNTER — TELEPHONE (OUTPATIENT)
Dept: GASTROENTEROLOGY | Facility: CLINIC | Age: 89
End: 2024-08-02
Payer: MEDICARE

## 2024-08-02 LAB — TSH SERPL DL<=0.005 MIU/L-ACNC: 2.69 UIU/ML (ref 0.27–4.2)

## 2024-08-02 NOTE — TELEPHONE ENCOUNTER
"Hub staff attempted to follow warm transfer process and was unsuccessful     Caller: Eliane Rao \"Chantel\"    Relationship to patient: Self    Best call back number: [phone]    Patient is needing: PT IS RETURNING CALL. PT STATED THAT SHE COULDN'T HEAR THE WHOLE MESSAGE BECAUSE IT HAD A LOT OF STATIC IN THE MESSAGE. IT'S OK TO LVM.   "

## 2024-08-02 NOTE — PROGRESS NOTES
Please let the patient know that the majority of her labs are all normal.  She does have slightly reduced kidney function which is stable for her over the last couple of years.   Rehabilitation services

## 2024-08-03 DIAGNOSIS — R05.9 COUGH, UNSPECIFIED TYPE: ICD-10-CM

## 2024-08-03 DIAGNOSIS — J20.9 ACUTE BRONCHITIS, UNSPECIFIED ORGANISM: ICD-10-CM

## 2024-08-05 RX ORDER — BENZONATATE 100 MG/1
CAPSULE ORAL
Qty: 30 CAPSULE | Refills: 1 | Status: SHIPPED | OUTPATIENT
Start: 2024-08-05

## 2024-08-05 NOTE — TELEPHONE ENCOUNTER
A message from Shelby Rodarte PA-C   Please let the patient know that the majority of her labs are all normal.  She does have slightly reduced kidney function which is stable for her over the last couple of years.      If you have any questions, please respond to this e-mail.

## 2024-08-07 ENCOUNTER — TELEPHONE (OUTPATIENT)
Age: 89
End: 2024-08-07

## 2024-08-07 NOTE — TELEPHONE ENCOUNTER
"  Caller: Eliane Rao \"Chantel\"    Relationship: Self    Best call back number:[phone] BEFORE NOON TODAY OR CALL TOMORROW        Who are you requesting to speak with (clinical staff, provider,  specific staff member):         What was the call regarding: PLEASE CALL PT TO [facility] 10/14/2024 ECHO AND APPT      "

## 2024-08-08 LAB — CALPROTECTIN STL-MCNT: 10 UG/G (ref 0–120)

## 2024-08-09 LAB — ELASTASE PANC STL-MCNT: 358 UG ELAST./G

## 2024-08-09 NOTE — PROGRESS NOTES
Please let the patient know her fecal calprotectin was normal.  This is less suggestive of microscopic colitis.  How she doing on the Pepto-Bismol?

## 2024-08-13 LAB
FA STL QL: NORMAL
NEUTRAL FAT STL QL: NORMAL

## 2024-08-20 RX ORDER — RIVAROXABAN 20 MG/1
20 TABLET, FILM COATED ORAL DAILY
Qty: 90 TABLET | Refills: 3 | Status: SHIPPED | OUTPATIENT
Start: 2024-08-20

## 2024-08-30 DIAGNOSIS — E78.5 HYPERLIPIDEMIA, UNSPECIFIED HYPERLIPIDEMIA TYPE: ICD-10-CM

## 2024-08-30 DIAGNOSIS — D68.51 FACTOR 5 LEIDEN MUTATION, HETEROZYGOUS: ICD-10-CM

## 2024-08-30 DIAGNOSIS — I10 PRIMARY HYPERTENSION: Primary | ICD-10-CM

## 2024-09-04 LAB
ALBUMIN SERPL-MCNC: 4.2 G/DL (ref 3.5–5.2)
ALBUMIN/GLOB SERPL: 1.9 G/DL
ALP SERPL-CCNC: 74 U/L (ref 39–117)
ALT SERPL-CCNC: 14 U/L (ref 1–33)
AST SERPL-CCNC: 20 U/L (ref 1–32)
BASOPHILS # BLD AUTO: 0.04 10*3/MM3 (ref 0–0.2)
BASOPHILS NFR BLD AUTO: 0.7 % (ref 0–1.5)
BILIRUB SERPL-MCNC: 0.8 MG/DL (ref 0–1.2)
BUN SERPL-MCNC: 17 MG/DL (ref 8–23)
BUN/CREAT SERPL: 19.3 (ref 7–25)
CALCIUM SERPL-MCNC: 9.6 MG/DL (ref 8.6–10.5)
CHLORIDE SERPL-SCNC: 103 MMOL/L (ref 98–107)
CHOLEST SERPL-MCNC: 249 MG/DL (ref 0–200)
CO2 SERPL-SCNC: 26.3 MMOL/L (ref 22–29)
CREAT SERPL-MCNC: 0.88 MG/DL (ref 0.57–1)
EGFRCR SERPLBLD CKD-EPI 2021: 63.3 ML/MIN/1.73
EOSINOPHIL # BLD AUTO: 0.49 10*3/MM3 (ref 0–0.4)
EOSINOPHIL NFR BLD AUTO: 8.5 % (ref 0.3–6.2)
ERYTHROCYTE [DISTWIDTH] IN BLOOD BY AUTOMATED COUNT: 12.3 % (ref 12.3–15.4)
GLOBULIN SER CALC-MCNC: 2.2 GM/DL
GLUCOSE SERPL-MCNC: 95 MG/DL (ref 65–99)
HCT VFR BLD AUTO: 39.4 % (ref 34–46.6)
HDLC SERPL-MCNC: 84 MG/DL (ref 40–60)
HGB BLD-MCNC: 13 G/DL (ref 12–15.9)
IMM GRANULOCYTES # BLD AUTO: 0.01 10*3/MM3 (ref 0–0.05)
IMM GRANULOCYTES NFR BLD AUTO: 0.2 % (ref 0–0.5)
LDLC SERPL CALC-MCNC: 150 MG/DL (ref 0–100)
LYMPHOCYTES # BLD AUTO: 1.73 10*3/MM3 (ref 0.7–3.1)
LYMPHOCYTES NFR BLD AUTO: 30 % (ref 19.6–45.3)
MCH RBC QN AUTO: 31 PG (ref 26.6–33)
MCHC RBC AUTO-ENTMCNC: 33 G/DL (ref 31.5–35.7)
MCV RBC AUTO: 93.8 FL (ref 79–97)
MONOCYTES # BLD AUTO: 0.58 10*3/MM3 (ref 0.1–0.9)
MONOCYTES NFR BLD AUTO: 10.1 % (ref 5–12)
NEUTROPHILS # BLD AUTO: 2.92 10*3/MM3 (ref 1.7–7)
NEUTROPHILS NFR BLD AUTO: 50.5 % (ref 42.7–76)
NRBC BLD AUTO-RTO: 0 /100 WBC (ref 0–0.2)
PLATELET # BLD AUTO: 277 10*3/MM3 (ref 140–450)
POTASSIUM SERPL-SCNC: 4.3 MMOL/L (ref 3.5–5.2)
PROT SERPL-MCNC: 6.4 G/DL (ref 6–8.5)
RBC # BLD AUTO: 4.2 10*6/MM3 (ref 3.77–5.28)
SODIUM SERPL-SCNC: 140 MMOL/L (ref 136–145)
TRIGL SERPL-MCNC: 86 MG/DL (ref 0–150)
VLDLC SERPL CALC-MCNC: 15 MG/DL (ref 5–40)
WBC # BLD AUTO: 5.77 10*3/MM3 (ref 3.4–10.8)

## 2024-09-05 ENCOUNTER — TELEPHONE (OUTPATIENT)
Dept: GASTROENTEROLOGY | Facility: CLINIC | Age: 89
End: 2024-09-05
Payer: MEDICARE

## 2024-09-05 NOTE — TELEPHONE ENCOUNTER
----- Message from Shelby Rodarte sent at 8/14/2024 11:14 AM EDT -----  Remainder of her labs are normal.

## 2024-09-05 NOTE — TELEPHONE ENCOUNTER
Pt reviewed results via [employer].     Sent pt [employer] msg advising of results and recommendations. Advised to call if any questions.

## 2024-09-10 ENCOUNTER — OFFICE VISIT (OUTPATIENT)
Dept: INTERNAL MEDICINE | Facility: CLINIC | Age: 89
End: 2024-09-10
Payer: MEDICARE

## 2024-09-10 VITALS
SYSTOLIC BLOOD PRESSURE: 130 MMHG | HEART RATE: 57 BPM | WEIGHT: 124.7 LBS | DIASTOLIC BLOOD PRESSURE: 72 MMHG | OXYGEN SATURATION: 95 % | BODY MASS INDEX: 22.09 KG/M2 | HEIGHT: 63 IN

## 2024-09-10 DIAGNOSIS — I10 PRIMARY HYPERTENSION: Chronic | ICD-10-CM

## 2024-09-10 DIAGNOSIS — Z00.00 MEDICARE ANNUAL WELLNESS VISIT, SUBSEQUENT: Primary | ICD-10-CM

## 2024-09-10 DIAGNOSIS — R05.2 SUBACUTE COUGH: ICD-10-CM

## 2024-09-10 DIAGNOSIS — E78.00 HYPERCHOLESTEROLEMIA: Chronic | ICD-10-CM

## 2024-09-10 PROCEDURE — G0439 PPPS, SUBSEQ VISIT: HCPCS | Performed by: STUDENT IN AN ORGANIZED HEALTH CARE EDUCATION/TRAINING PROGRAM

## 2024-09-10 PROCEDURE — 99213 OFFICE O/P EST LOW 20 MIN: CPT | Performed by: STUDENT IN AN ORGANIZED HEALTH CARE EDUCATION/TRAINING PROGRAM

## 2024-09-10 PROCEDURE — 1126F AMNT PAIN NOTED NONE PRSNT: CPT | Performed by: STUDENT IN AN ORGANIZED HEALTH CARE EDUCATION/TRAINING PROGRAM

## 2024-09-10 RX ORDER — LEVOCETIRIZINE DIHYDROCHLORIDE 5 MG/1
5 TABLET, FILM COATED ORAL EVERY EVENING
Qty: 90 TABLET | Refills: 2 | Status: SHIPPED | OUTPATIENT
Start: 2024-09-10

## 2024-09-10 RX ORDER — FLUTICASONE PROPIONATE 50 MCG
2 SPRAY, SUSPENSION (ML) NASAL DAILY
Qty: 9.9 G | Refills: 0 | Status: SHIPPED | OUTPATIENT
Start: 2024-09-10

## 2024-09-10 RX ORDER — PREDNISONE 20 MG/1
40 TABLET ORAL DAILY
Qty: 10 TABLET | Refills: 0 | Status: SHIPPED | OUTPATIENT
Start: 2024-09-10 | End: 2024-09-15

## 2024-09-10 NOTE — ASSESSMENT & PLAN NOTE
Had cramps on statin. Try decreasing high fat and greasy foods, increasing exercise to a total of 150 minutes weekly at a brisk walk or more, and increasing vegetables.

## 2024-09-10 NOTE — PROGRESS NOTES
The ABCs of the Annual Wellness Visit  Subsequent Medicare Wellness Visit    Chief Complaint   Patient presents with    Medicare Wellness-subsequent      Subjective    History of Present Illness:  Eliane Rao is a 89 y.o. female  with patent foramen ovale, hypertension, mixed hyperlipidemia, IBS, renal insuficiency, Chronic PE and Factor V Leiden who presents for a Subsequent Medicare Wellness Visit.     The following portions of the patient's history were reviewed and   updated as appropriate: allergies, current medications, past family history, past medical history, past social history, past surgical history, and problem list.    A cold for 1 month. Cold improved but still has cough and drainage. Tested negative do COVID. Went to  8/27.  She had an x-ray there which showed right perihilar nodules extending lateral to the right pulmonary artery within the inferior right upper lobe for which CT follow-up evaluation is recommended. Previously seen on prior imaging.  Pulmonology has been following this no definite infiltrate. No fevers, chills or shortness of breath. Previously had inhaler for asthma. Taking Tessalon perles.     On AC for history of PE and factor V Leiden    Diarrhea improving. GI was concerned for re-occurrence of lymphocytic colitis. Pepto bismol has helped.    HTN well controlled.    She had muscle soreness with pravastatin.    Compared to one year ago, the patient feels her physical   health is worse.    Compared to one year ago, the patient feels her mental   health is the same.    Recent Hospitalizations:  She was not admitted to the hospital during the last year.       Current Medical Providers:  Patient Care Team:  Addi Mojica MD as PCP - General (Internal Medicine)  Fuad Juarez MD as Consulting Physician (Cardiology)  Lee Sierra MD as Consulting Physician (Ophthalmology)  Stew Alfaro MD as Consulting Physician (Pulmonary Disease)  Juventino Mccauley Jr., MD as  Consulting Physician (Urology)  Fuad Taylor MD as Consulting Physician (Gastroenterology)  Dyan Baxter MD as Consulting Physician (Dermatology)    Outpatient Medications Prior to Visit   Medication Sig Dispense Refill    amLODIPine (NORVASC) 5 MG tablet TAKE ONE TABLET BY MOUTH TWICE A DAY (Patient taking differently: 3 (Three) Times a Day.) 180 tablet 3    DM-APAP-CPM (Coricidin HBP) -2 MG tablet Take 1 tablet by mouth Every 6 (Six) Hours As Needed (congestion). As directed on packaging.      furosemide (LASIX) 20 MG tablet TAKE 1 TABLET BY MOUTH DAILY 30 tablet 0    Lactobacillus Rhamnosus, GG, (CULTURELLE PO) Take  by mouth Daily.      losartan (COZAAR) 50 MG tablet TAKE 1 TABLET BY MOUTH DAILY 90 tablet 2    metoprolol tartrate (LOPRESSOR) 50 MG tablet TAKE ONE TABLET BY MOUTH TWICE A  tablet 3    montelukast (SINGULAIR) 10 MG tablet Take 1 tablet by mouth Every Night. 90 tablet 1    rivaroxaban (Xarelto) 20 MG tablet TAKE 1 TABLET BY MOUTH DAILY 90 tablet 3    Zinc 50 MG tablet Take 1 tablet by mouth Daily.       No facility-administered medications prior to visit.       No opioid medication identified on active medication list. I have reviewed chart for other potential  high risk medication/s and harmful drug interactions in the elderly.        Aspirin is not on active medication list.  Aspirin use is contraindicated for this patient due to: current use of Xarelto.  .    Patient Active Problem List   Diagnosis    Hypercholesterolemia    Hypertension    Chronic renal insufficiency    IBS (irritable bowel syndrome)    PFO (patent foramen ovale)    Left hip pain    Fall    Allergy    Former smoker    Chronic pulmonary embolism without acute cor pulmonale    Factor 5 Leiden mutation, heterozygous     Advance Care Planning  Advance Directive is not on file.  ACP discussion was held with the patient during this visit. Patient has an advance directive (not in EMR), copy requested.         "  Objective    Vitals:    09/10/24 1306   BP: 130/72   Pulse: 57   SpO2: 95%   Weight: 56.6 kg (124 lb 11.2 oz)   Height: 160 cm (62.99\")     Estimated body mass index is 22.1 kg/m² as calculated from the following:    Height as of this encounter: 160 cm (62.99\").    Weight as of this encounter: 56.6 kg (124 lb 11.2 oz).    BMI is within normal parameters. No other follow-up for BMI required.      Does the patient have evidence of cognitive impairment? No    Physical Exam  Constitutional:       Appearance: Normal appearance. She is normal weight.   HENT:      Mouth/Throat:      Pharynx: Posterior oropharyngeal erythema present.   Cardiovascular:      Rate and Rhythm: Normal rate and regular rhythm.      Heart sounds: Normal heart sounds. No murmur heard.  Pulmonary:      Effort: Pulmonary effort is normal.      Breath sounds: No wheezing.   Abdominal:      General: Abdomen is flat. There is no distension.      Palpations: Abdomen is soft.      Tenderness: There is no abdominal tenderness.   Skin:     General: Skin is warm and dry.   Neurological:      Mental Status: She is alert.   Psychiatric:         Mood and Affect: Mood normal.         Behavior: Behavior normal.         Thought Content: Thought content normal.       Lab Results   Component Value Date    CHLPL 249 (H) 2024    TRIG 86 2024    HDL 84 (H) 2024     (H) 2024    VLDL 15 2024            HEALTH RISK ASSESSMENT    Smoking Status:  Social History     Tobacco Use   Smoking Status Former    Current packs/day: 0.00    Average packs/day: 0.5 packs/day for 35.3 years (17.7 ttl pk-yrs)    Types: Cigarettes    Start date: 1954    Quit date: 1990    Years since quittin.7    Passive exposure: Past   Smokeless Tobacco Never   Tobacco Comments    Very light smoker beginning in college stopping in      Alcohol Consumption:  Social History     Substance and Sexual Activity   Alcohol Use Yes    Alcohol/week: 5.0 " standard drinks of alcohol    Types: 5 Glasses of wine per week    Comment: Occasional/caffeine use     Fall Risk Screen:    RADHA Fall Risk Assessment was completed, and patient is at LOW risk for falls.Assessment completed on:9/10/2024    Depression Screenin/10/2024     1:07 PM   PHQ-2/PHQ-9 Depression Screening   Little Interest or Pleasure in Doing Things 0-->not at all   Feeling Down, Depressed or Hopeless 0-->not at all   PHQ-9: Brief Depression Severity Measure Score 0       Health Habits and Functional and Cognitive Screenin/5/2024    11:48 AM   Functional & Cognitive Status   Do you have difficulty preparing food and eating? No   Do you have difficulty bathing yourself, getting dressed or grooming yourself? No   Do you have difficulty using the toilet? No   Do you have difficulty moving around from place to place? No   Do you have trouble with steps or getting out of a bed or a chair? No   Current Diet Well Balanced Diet   Dental Exam Not up to date   Eye Exam Up to date   Exercise (times per week) 2 times per week   Current Exercises Include Home Exercise Program (TV[employer] Computer[employer] Et[employer].);Yard Work   Do you need help using the phone?  No   Are you deaf or do you have serious difficulty hearing?  No   Do you need help to go to places out of walking distance? No   Do you need help shopping? No   Do you need help preparing meals?  No   Do you need help with housework?  No   Do you need help with laundry? No   Do you need help taking your medications? No   Do you need help managing money? No   Do you ever drive or ride in a car without wearing a seat belt? No   Have you felt unusual stress, anger or loneliness in the last month? No   Who do you live with? Alone   If you need help, do you have trouble finding someone available to you? No   Have you been bothered in the last four weeks by sexual problems? No   Do you have difficulty concentrating, remembering or making decisions? No        Age-appropriate Screening Schedule:  Refer to the list below for future screening recommendations based on patient's age, sex and/or medical conditions. Orders for these recommended tests are listed in the plan section. The patient has been provided with a written plan.    Health Maintenance   Topic Date Due    COLORECTAL CANCER SCREENING  02/04/2024    ANNUAL WELLNESS VISIT  08/22/2024    COVID-19 Vaccine (5 - 2023-24 season) 09/01/2024    INFLUENZA VACCINE  08/01/2024    DXA SCAN  12/19/2024    LIPID PANEL  09/04/2025    TDAP/TD VACCINES (2 - Td or Tdap) 12/13/2032    RSV Vaccine - Adults  Completed    Pneumococcal Vaccine 65+  Completed    ZOSTER VACCINE  Completed    MAMMOGRAM  Discontinued              Assessment & Plan   CMS Preventative Services Quick Reference  Risk Factors Identified During Encounter  Immunizations Discussed/Encouraged: Influenza and COVID19  The above risks/problems have been discussed with the patient.  Follow up actions/plans if indicated are seen below in the Assessment/Plan Section.  Pertinent information has been shared with the patient in the After Visit Summary.    Diagnoses and all orders for this visit:    1. Medicare annual wellness visit, subsequent (Primary)    2. Subacute cough  -     levocetirizine (XYZAL) 5 MG tablet; Take 1 tablet by mouth Every Evening.  Dispense: 90 tablet; Refill: 2  -     fluticasone (FLONASE) 50 MCG/ACT nasal spray; 2 sprays into the nostril(s) as directed by provider Daily.  Dispense: 9.9 g; Refill: 0  -     predniSONE (DELTASONE) 20 MG tablet; Take 2 tablets by mouth Daily for 5 days.  Dispense: 10 tablet; Refill: 0    3. Primary hypertension  Assessment & Plan:  Hypertension is stable and controlled  Continue current treatment regimen.  Blood pressure will be reassessed in 6 months.      4. Hypercholesterolemia  Assessment & Plan:    Had cramps on statin. Try decreasing high fat and greasy foods, increasing exercise to a total of 150 minutes  weekly at a brisk walk or more, and increasing vegetables.           Right hilar lymph nodes seen on previous CT. followed by her pulmonologist.  She continues to have cough.  I suspect mostly postnasal drip but she does report some wheezing.  I encouraged her to start over-the-counter allergy medicines and we will treat her with a course of prednisone for suspected asthma exacerbation.    Follow Up:   Return in about 6 months (around 3/10/2025) for Recheck.     An After Visit Summary and PPPS were made available to the patient.

## 2024-10-24 ENCOUNTER — OFFICE VISIT (OUTPATIENT)
Age: 89
End: 2024-10-24
Payer: MEDICARE

## 2024-10-24 ENCOUNTER — HOSPITAL ENCOUNTER (OUTPATIENT)
Dept: CARDIOLOGY | Facility: HOSPITAL | Age: 89
Discharge: HOME | End: 2024-10-24
Admitting: NURSE PRACTITIONER
Payer: MEDICARE

## 2024-10-24 VITALS
HEIGHT: 62 IN | DIASTOLIC BLOOD PRESSURE: 88 MMHG | WEIGHT: 124 LBS | SYSTOLIC BLOOD PRESSURE: 170 MMHG | BODY MASS INDEX: 22.82 KG/M2

## 2024-10-24 VITALS
WEIGHT: 127.6 LBS | DIASTOLIC BLOOD PRESSURE: 82 MMHG | HEIGHT: 63 IN | BODY MASS INDEX: 22.61 KG/M2 | HEART RATE: 57 BPM | SYSTOLIC BLOOD PRESSURE: 158 MMHG

## 2024-10-24 DIAGNOSIS — I10 PRIMARY HYPERTENSION: ICD-10-CM

## 2024-10-24 DIAGNOSIS — Q21.12 PFO (PATENT FORAMEN OVALE): Primary | ICD-10-CM

## 2024-10-24 DIAGNOSIS — E78.00 HYPERCHOLESTEROLEMIA: ICD-10-CM

## 2024-10-24 DIAGNOSIS — I27.20 PULMONARY HYPERTENSION: ICD-10-CM

## 2024-10-24 LAB
AORTIC DIMENSIONLESS INDEX: 0.7 (DI)
BH CV ECHO MEAS - ACS: 1.53 CM
BH CV ECHO MEAS - AO MAX PG: 7 MMHG
BH CV ECHO MEAS - AO MEAN PG: 3.8 MMHG
BH CV ECHO MEAS - AO ROOT DIAM: 2.9 CM
BH CV ECHO MEAS - AO V2 MAX: 132.7 CM/SEC
BH CV ECHO MEAS - AO V2 VTI: 29 CM
BH CV ECHO MEAS - AVA(I,D): 1.83 CM2
BH CV ECHO MEAS - EDV(CUBED): 52.2 ML
BH CV ECHO MEAS - EDV(MOD-SP2): 50 ML
BH CV ECHO MEAS - EDV(MOD-SP4): 48 ML
BH CV ECHO MEAS - EF(MOD-BP): 62 %
BH CV ECHO MEAS - EF(MOD-SP2): 60 %
BH CV ECHO MEAS - EF(MOD-SP4): 66.7 %
BH CV ECHO MEAS - ESV(CUBED): 14.2 ML
BH CV ECHO MEAS - ESV(MOD-SP2): 20 ML
BH CV ECHO MEAS - ESV(MOD-SP4): 16 ML
BH CV ECHO MEAS - FS: 35.2 %
BH CV ECHO MEAS - IVS/LVPW: 1.03 CM
BH CV ECHO MEAS - IVSD: 0.84 CM
BH CV ECHO MEAS - LAT PEAK E' VEL: 6.2 CM/SEC
BH CV ECHO MEAS - LV DIASTOLIC VOL/BSA (35-75): 30.8 CM2
BH CV ECHO MEAS - LV MASS(C)D: 87.4 GRAMS
BH CV ECHO MEAS - LV MAX PG: 3.7 MMHG
BH CV ECHO MEAS - LV MEAN PG: 2.1 MMHG
BH CV ECHO MEAS - LV SYSTOLIC VOL/BSA (12-30): 10.3 CM2
BH CV ECHO MEAS - LV V1 MAX: 96.3 CM/SEC
BH CV ECHO MEAS - LV V1 VTI: 19.2 CM
BH CV ECHO MEAS - LVIDD: 3.7 CM
BH CV ECHO MEAS - LVIDS: 2.42 CM
BH CV ECHO MEAS - LVOT AREA: 2.8 CM2
BH CV ECHO MEAS - LVOT DIAM: 1.87 CM
BH CV ECHO MEAS - LVPWD: 0.82 CM
BH CV ECHO MEAS - MED PEAK E' VEL: 5.8 CM/SEC
BH CV ECHO MEAS - MR MAX PG: 80.8 MMHG
BH CV ECHO MEAS - MR MAX VEL: 449.4 CM/SEC
BH CV ECHO MEAS - MV A DUR: 0.1 SEC
BH CV ECHO MEAS - MV A MAX VEL: 63.4 CM/SEC
BH CV ECHO MEAS - MV DEC SLOPE: 293.3 CM/SEC2
BH CV ECHO MEAS - MV DEC TIME: 0.17 SEC
BH CV ECHO MEAS - MV E MAX VEL: 73.3 CM/SEC
BH CV ECHO MEAS - MV E/A: 1.16
BH CV ECHO MEAS - MV MAX PG: 3.1 MMHG
BH CV ECHO MEAS - MV MEAN PG: 1.07 MMHG
BH CV ECHO MEAS - MV P1/2T: 66.8 MSEC
BH CV ECHO MEAS - MV V2 VTI: 20.6 CM
BH CV ECHO MEAS - MVA(P1/2T): 3.3 CM2
BH CV ECHO MEAS - MVA(VTI): 2.6 CM2
BH CV ECHO MEAS - PA ACC TIME: 0.12 SEC
BH CV ECHO MEAS - PA V2 MAX: 70 CM/SEC
BH CV ECHO MEAS - PULM A REVS DUR: 0.11 SEC
BH CV ECHO MEAS - PULM A REVS VEL: 28.9 CM/SEC
BH CV ECHO MEAS - PULM DIAS VEL: 34.1 CM/SEC
BH CV ECHO MEAS - PULM S/D: 1.22
BH CV ECHO MEAS - PULM SYS VEL: 41.4 CM/SEC
BH CV ECHO MEAS - RAP SYSTOLE: 3 MMHG
BH CV ECHO MEAS - RV MAX PG: 1.24 MMHG
BH CV ECHO MEAS - RV V1 MAX: 55.7 CM/SEC
BH CV ECHO MEAS - RV V1 VTI: 13.4 CM
BH CV ECHO MEAS - RVSP: 33 MMHG
BH CV ECHO MEAS - SUP REN AO DIAM: 1.8 CM
BH CV ECHO MEAS - SV(LVOT): 53 ML
BH CV ECHO MEAS - SV(MOD-SP2): 30 ML
BH CV ECHO MEAS - SV(MOD-SP4): 32 ML
BH CV ECHO MEAS - SVI(LVOT): 34 ML/M2
BH CV ECHO MEAS - SVI(MOD-SP2): 19.2 ML/M2
BH CV ECHO MEAS - SVI(MOD-SP4): 20.5 ML/M2
BH CV ECHO MEAS - TAPSE (>1.6): 1.71 CM
BH CV ECHO MEAS - TR MAX PG: 30 MMHG
BH CV ECHO MEAS - TR MAX VEL: 265 CM/SEC
BH CV ECHO MEASUREMENTS AVERAGE E/E' RATIO: 12.22
BH CV XLRA - TDI S': 12.2 CM/SEC
LEFT ATRIUM VOLUME INDEX: 26.7 ML/M2
SINUS: 2.32 CM

## 2024-10-24 PROCEDURE — 93000 ELECTROCARDIOGRAM COMPLETE: CPT | Performed by: INTERNAL MEDICINE

## 2024-10-24 PROCEDURE — 1160F RVW MEDS BY RX/DR IN RCRD: CPT | Performed by: INTERNAL MEDICINE

## 2024-10-24 PROCEDURE — 93306 TTE W/DOPPLER COMPLETE: CPT

## 2024-10-24 PROCEDURE — 99214 OFFICE O/P EST MOD 30 MIN: CPT | Performed by: INTERNAL MEDICINE

## 2024-10-24 PROCEDURE — 1159F MED LIST DOCD IN RCRD: CPT | Performed by: INTERNAL MEDICINE

## 2024-10-24 NOTE — PROGRESS NOTES
[facility] Follow Up Office Note     Encounter Date:10/24/24  Patient:Eliane Rao  :1935  MRN:[id number]      Chief Complaint:   Chief Complaint   Patient presents with    Pulmonary hypertension     6 month f/u       History of Presenting Illness:      Ms. Rao is a 89 y.o. woman with past medical history notable for factor V Leiden, history of DVT/PE, patent foramen ovale, hypertension, mixed hyperlipidemia who presents to our office for scheduled follow up.  She was last seen approximate 6 months ago at that time was doing well no changes were needed.  She presents today for 6-month follow-up echocardiogram was performed this morning to follow-up on her recent pulmonary emboli.        Review of Systems:  Review of Systems   Constitutional: Negative.   HENT: Negative.     Eyes: Negative.    Cardiovascular:  Positive for leg swelling.   Respiratory: Negative.     Endocrine: Negative.    Hematologic/Lymphatic: Negative.    Skin: Negative.    Musculoskeletal: Negative.    Gastrointestinal: Negative.    Genitourinary: Negative.    Neurological: Negative.    Psychiatric/Behavioral: Negative.     Allergic/Immunologic: Negative.        Current Outpatient Medications on File Prior to Visit   Medication Sig Dispense Refill    amLODIPine (NORVASC) 5 MG tablet TAKE ONE TABLET BY MOUTH TWICE A  tablet 3    fluticasone (FLONASE) 50 MCG/ACT nasal spray 2 sprays into the nostril(s) as directed by provider Daily. 9.9 g 0    furosemide (LASIX) 20 MG tablet TAKE 1 TABLET BY MOUTH DAILY 30 tablet 0    Lactobacillus Rhamnosus, GG, (CULTURELLE PO) Take  by mouth Daily.      losartan (COZAAR) 50 MG tablet TAKE 1 TABLET BY MOUTH DAILY 90 tablet 2    metoprolol tartrate (LOPRESSOR) 50 MG tablet TAKE ONE TABLET BY MOUTH TWICE A  tablet 3    montelukast (SINGULAIR) 10 MG tablet Take 1 tablet by mouth Every Night. 90 tablet 1    rivaroxaban (Xarelto) 20 MG tablet TAKE 1 TABLET BY MOUTH DAILY 90  tablet 3    Zinc 50 MG tablet Take 1 tablet by mouth Daily. (Patient not taking: Reported on 10/24/2024)      [DISCONTINUED] DM-APAP-CPM (Coricidin HBP) -2 MG tablet Take 1 tablet by mouth Every 6 (Six) Hours As Needed (congestion). As directed on packaging.      [DISCONTINUED] levocetirizine (XYZAL) 5 MG tablet Take 1 tablet by mouth Every Evening. 90 tablet 2     No current facility-administered medications on file prior to visit.        Allergies   Allergen Reactions    Sulfa Antibiotics Nausea Only and GI Intolerance       Past Medical History:   Diagnosis Date    Allergic Sulpha-10 yrs ago    Arthritis 7 yrs ago    Asthma     Cataract 10 yrs ago    Chest pain     Chronic renal insufficiency     Clotting disorder     Deep vein thrombosis     Diastolic dysfunction     grade 1    Diverticulosis     Dyspnea     Dysuria     Hiatal hernia     History of cataract     Hyperlipidemia     Hypertension     IBS (irritable bowel syndrome)     Insomnia     Mitral regurgitation     Multiple polyps of sigmoid colon     Osteopenia     Osteoporosis     Patent foramen ovale     Pulmonary embolism     Simple goiter     Tricuspid regurgitation     UTI (urinary tract infection)        Past Surgical History:   Procedure Laterality Date    ADENOIDECTOMY      CATARACT EXTRACTION, BILATERAL      COLONOSCOPY  2013    EYE SURGERY  Cadracts removed        TONSILLECTOMY      TUBAL ABDOMINAL LIGATION         Social History     Socioeconomic History    Marital status:    Tobacco Use    Smoking status: Former     Current packs/day: 0.00     Average packs/day: 0.5 packs/day for 35.3 years (17.7 ttl pk-yrs)     Types: Cigarettes     Start date: 1954     Quit date: 1990     Years since quittin.8     Passive exposure: Past    Smokeless tobacco: Never    Tobacco comments:     Very light smoker beginning in college stopping in    Vaping Use    Vaping status: Never Used   Substance and Sexual Activity  "   Alcohol use: Yes     Alcohol/week: 5.0 standard drinks of alcohol     Types: 5 Glasses of wine per week     Comment: Occasional/caffeine use    Drug use: Never    Sexual activity: Not Currently     Partners: Male     Birth control/protection: Tubal ligation, Pill, Surgical       Family History   Problem Relation Age of Onset    Cancer Mother         Mother    Arthritis Mother         Mother    Cancer Maternal Aunt         Aunt-bladder    Diabetes Other        The following portions of the patient's history were reviewed and updated as appropriate: allergies, current medications, past family history, past medical history, past social history, past surgical history and problem list.       Objective:       Vitals:    10/24/24 0919   BP: 158/82   BP Location: Left arm   Patient Position: Sitting   Pulse: 57   Weight: 57.9 kg (127 lb 9.6 oz)   Height: 160 cm (62.99\")     Body mass index is 22.61 kg/m².     Physical Exam:  Constitutional: Well appearing, Well-developed, No acute distress   HENT: Oropharynx clear and membrane moist  Eyes: Normal conjunctiva, no sclera icterus.  Neck: Supple, no carotid bruit bilaterally.  Cardiovascular: Regular rate and rhythm, No Murmur, Trace bilateral lower extremity edema.  Pulmonary: Normal respiratory effort, normal lung sounds, no wheezing.  Neurological: Alert and orient x 3.   Skin: Warm, dry, no ecchymosis, no rash.  Psych: Appropriate mood and affect. Normal judgment and insight.     Lab Results   Component Value Date    GLUCOSE 95 09/04/2024    BUN 17 09/04/2024    CREATININE 0.88 09/04/2024    EGFRIFNONA 49 (L) 02/18/2022    EGFRIFAFRI >60 05/23/2022    BCR 19.3 09/04/2024    K 4.3 09/04/2024    CO2 26.3 09/04/2024    CALCIUM 9.6 09/04/2024    PROTENTOTREF 6.4 09/04/2024    ALBUMIN 4.2 09/04/2024    LABIL2 1.9 09/04/2024    AST 20 09/04/2024    ALT 14 09/04/2024       Lab Results   Component Value Date    WBC 5.77 09/04/2024    HGB 13.0 09/04/2024    HCT 39.4 09/04/2024    " MCV 93.8 09/04/2024     09/04/2024       Lab Results   Component Value Date    TROPONINI 0.013 05/21/2022       Lab Results   Component Value Date    CHOL 156 08/02/2019    CHOL 235 (H) 02/08/2019    CHOL 186 09/13/2018    CHLPL 249 (H) 09/04/2024    CHLPL 214 (H) 08/01/2024    CHLPL 219 (H) 08/10/2023     Lab Results   Component Value Date    TRIG 86 09/04/2024    TRIG 62 08/01/2024    TRIG 43 08/10/2023     Lab Results   Component Value Date    HDL 84 (H) 09/04/2024    HDL 75 (H) 08/01/2024    HDL 84 (H) 08/10/2023     Lab Results   Component Value Date     (H) 09/04/2024     (H) 08/01/2024     (H) 08/10/2023       Lab Results   Component Value Date    TSH 2.690 08/01/2024         ECG 12 Lead    Date/Time: 10/24/2024 10:11 AM  Performed by: Fuad Juarez MD    Authorized by: Fuad Juarez MD  Comparison: compared with previous ECG from 3/15/2024  Similar to previous ECG  Rhythm: sinus rhythm        Echocardiogram 10/24/2024 with images reviewed by myself:  Left ventricular systolic function is normal. Calculated left ventricular EF = 62% Left ventricular ejection fraction appears to be 61 - 65%.  Left ventricular diastolic function was normal.  Estimated right ventricular systolic pressure from tricuspid regurgitation is normal (<35 mmHg).     Venous Duplex 8/22/2023:  Chronic left lower extremity deep vein thrombosis noted in the mid femoral, distal femoral and popliteal.  Chronic left lower extremity superficial thrombophlebitis noted in the small saphenous.  All other left sided veins appeared normal.    Echocardiogram 9/14/2023:  Normal LV function and no significant valvular abnormalities    Echocardiogram 9/1/2022:  Calculated left ventricular EF = 65.3% Estimated left ventricular EF was in agreement with the calculated left ventricular EF. Left ventricular systolic function is normal.  Left ventricular diastolic function is consistent with (grade III w/high LAP)  reversible restrictive pattern.  There is calcification of the aortic valve.  Mild to moderate mitral valve regurgitation is present.  Moderate tricuspid valve regurgitation is present.  Estimated right ventricular systolic pressure from tricuspid regurgitation is mildly elevated (35-45 mmHg).    Echocardiogram 5/22/2022 [facility]:  The ejection fraction biplane was calculated at 63%. The left ventricular chamber size, wall thickness, and systolic function are within normal limits. There are no regional wall motion abnormalities observed. Abnormal left ventricular diastolic filling consistent with impaired relaxation. Mitral valve tissue Doppler E/E'' ratio consistent with normal left atrial pressures.  Mild mitral regurgitation is present.   Mild aortic leaflet calcification.   Mild tricuspid regurgitation.   Right ventricular systolic pressure of 62 mmHg.     Pulmonary emboli 5/21/2022 [facility]:  Multiple acute pulmonary emboli are noted located most proximally in the distal aspect of the right main pulmonary artery with involvement of multiple lobar, segmental, and subsegmental branches of the bilateral upper lobe, right middle lobe, and bilateral lower lobes. The RV to LV ratio is less than 1.     Echocardiogram 11/12/2021:  Left ventricular ejection fraction appears to be 61 - 65%. Left ventricular systolic function is normal. Left ventricular diastolic function is consistent with (grade II w/high LAP) pseudonormalization  Normal right ventricular cavity size and systolic function noted.  The left atrial cavity is mild to moderately dilated.  Mild mitral valve regurgitation is present.  Mild tricuspid valve regurgitation is present  Calculated right ventricular systolic pressure from tricuspid regurgitation is 39.0 mmHg.  There is no evidence of pericardial effusion.        Assessment:          Diagnosis Plan   1. PFO (patent foramen ovale)  ECG 12 Lead      2. Pulmonary hypertension  ECG 12 Lead       3. Primary hypertension        4. Hypercholesterolemia                   Plan:       Ms. Rao is a 89 y.o. woman with past medical history notable for factor V Leiden, history of DVT and PE, patent foramen ovale, hypertension, mixed hyperlipidemia who presents to our office for scheduled follow up.  Overall doing well echocardiogram today shows normal pulmonary pressures and heart function no changes needed besides restarting her amlodipine which she stopped on her own.  Blood pressure is high as a result but previously has been well-controlled on this regimen.      Pulmonary emboli:  Likely provoked PE and DVT however in the setting of known factor V Leiden would recommend lifelong anticoagulation  Repeat echocardiogram today show normal RV function and pulmonary pressure.    Factor V Leiden:  Continue anticoagulation    Pulmonary hypertension:  Echocardiogram today demonstrates normal PA pressures  Follow-up echocardiogram in 12 months (Can stop screening 4 years post PE)    Hypertension:  Blood pressure elevated but she stopped her amlodipine she will restart    Patent foramen ovale:  Likely an incidental finding at this point given her advanced age      Follow-up:  6 Months      Thank you for allowing me to participate in the care of Eliane Rao. Feel free to contact me directly with any further questions or concerns.    Fuad Juarez MD  [facility]  10/24/24  10:13 EDT

## 2024-10-29 ENCOUNTER — TELEPHONE (OUTPATIENT)
Dept: INTERNAL MEDICINE | Facility: CLINIC | Age: 89
End: 2024-10-29
Payer: MEDICARE

## 2024-10-29 NOTE — TELEPHONE ENCOUNTER
"  Caller: Eliane Rao \"Chantel\"    Relationship: Self    Best call back number: [phone]    What is the best time to reach you: ANYTIME     Who are you requesting to speak with (clinical staff, provider,  specific staff member): CLINICAL     What was the call regarding: PATIENT STATES THAT SHE HAD GOTTEN A CUT ON HER LEG ABOUT 2 WEEKS AGO, AND THE CUT IS NOT WANTING TO HEAL.     PATIENT WOULD LIKE TO KNOW WHAT HER PCP WOULD ADVISE.     "

## 2024-10-31 ENCOUNTER — OFFICE VISIT (OUTPATIENT)
Dept: INTERNAL MEDICINE | Facility: CLINIC | Age: 89
End: 2024-10-31
Payer: MEDICARE

## 2024-10-31 VITALS
DIASTOLIC BLOOD PRESSURE: 78 MMHG | WEIGHT: 124.3 LBS | OXYGEN SATURATION: 96 % | HEIGHT: 62 IN | SYSTOLIC BLOOD PRESSURE: 145 MMHG | HEART RATE: 76 BPM | BODY MASS INDEX: 22.87 KG/M2

## 2024-10-31 DIAGNOSIS — S81.802D WOUND OF LEFT LOWER EXTREMITY, SUBSEQUENT ENCOUNTER: Primary | ICD-10-CM

## 2024-10-31 RX ORDER — MUPIROCIN 20 MG/G
1 OINTMENT TOPICAL 2 TIMES DAILY
Qty: 30 G | Refills: 0 | Status: SHIPPED | OUTPATIENT
Start: 2024-10-31

## 2024-10-31 NOTE — PROGRESS NOTES
Answers submitted by the patient for this visit:  Other (Submitted on 10/30/2024)  Please describe your symptoms.: Cut on left leg  Have you had these symptoms before?: No  How long have you been having these symptoms?: 1-2 weeks  Please list any medications you are currently taking for this condition.: Polysporin  Please describe any probable cause for these symptoms. : Accidential cut on leg  Primary Reason for Visit (Submitted on 10/30/2024)  What is the primary reason for your visit?: Problem Not Listed    Addi Mojica M.D.  Internal Medicine  [facility]  [address], [address]  [place] [address]  [phone]      Chief Complaint  Laceration (Laceration on left shin )    SUBJECTIVE    History of Present Illness    Eliane Rao is a 89 y.o. female with past medical history significant for factor V Leiden, DVT/PE, PFO, hypertension, hyperlipidemia who presents to the office today as an established patient that last saw me on 9/10/2024.  I last saw her for Medicare wellness visit.    Patient here for cut on her legs for 2 weeks which is not healing.  She was reaching for box on shelf. She lost her balance and box landed on her leg. She is on xarelto for history of DVT/PE. She does have lower extremity edema.  She recently saw her cardiologist who advised her to resume amlodipine.  She is putting Polysporin on the wound. Area sore. Some yellow disharge. No warmth. Concerned wound is not healing.     Review of Systems    Allergies   Allergen Reactions    Sulfa Antibiotics Nausea Only and GI Intolerance        Outpatient Medications Marked as Taking for the 10/31/24 encounter (Office Visit) with Addi Mojica MD   Medication Sig Dispense Refill    amLODIPine (NORVASC) 5 MG tablet TAKE ONE TABLET BY MOUTH TWICE A  tablet 3    furosemide (LASIX) 20 MG tablet TAKE 1 TABLET BY MOUTH DAILY 30 tablet 0    Lactobacillus Rhamnosus, GG, (CULTURELLE PO) Take  by mouth Daily.      losartan  "(COZAAR) 50 MG tablet TAKE 1 TABLET BY MOUTH DAILY 90 tablet 2    metoprolol tartrate (LOPRESSOR) 50 MG tablet TAKE ONE TABLET BY MOUTH TWICE A  tablet 3    montelukast (SINGULAIR) 10 MG tablet Take 1 tablet by mouth Every Night. 90 tablet 1    rivaroxaban (Xarelto) 20 MG tablet TAKE 1 TABLET BY MOUTH DAILY 90 tablet 3        Past Medical History:   Diagnosis Date    Allergic Sulpha-10 yrs ago    Arthritis 7 yrs ago    Asthma     Cataract 10 yrs ago    Chest pain     Chronic renal insufficiency     Clotting disorder     Deep vein thrombosis     Diastolic dysfunction     grade 1    Diverticulosis     Dyspnea     Dysuria     Hiatal hernia     History of cataract     Hyperlipidemia     Hypertension     IBS (irritable bowel syndrome)     Insomnia     Mitral regurgitation     Multiple polyps of sigmoid colon     Osteopenia     Osteoporosis     Patent foramen ovale     Pulmonary embolism     Simple goiter     Tricuspid regurgitation     UTI (urinary tract infection)      Past Surgical History:   Procedure Laterality Date    ADENOIDECTOMY  1940    CATARACT EXTRACTION, BILATERAL  2009    COLONOSCOPY  11/14/2013    EYE SURGERY  Cadracts removed    2015    TONSILLECTOMY      TUBAL ABDOMINAL LIGATION       Family History   Problem Relation Age of Onset    Cancer Mother         Mother    Arthritis Mother         Mother    Cancer Maternal Aunt         Aunt-bladder    Diabetes Other     reports that she quit smoking about 34 years ago. Her smoking use included cigarettes. She started smoking about 70 years ago. She has a 17.7 pack-year smoking history. She has been exposed to tobacco smoke. She has never used smokeless tobacco. She reports current alcohol use of about 5.0 standard drinks of alcohol per week. She reports that she does not use drugs.    OBJECTIVE    Vital Signs:   /78   Pulse 76   Ht 157.5 cm (62.01\")   Wt 56.4 kg (124 lb 4.8 oz)   SpO2 96%   BMI 22.73 kg/m²     Physical Exam  Constitutional:      "  General: She is not in acute distress.     Appearance: Normal appearance.   Pulmonary:      Effort: Pulmonary effort is normal. No respiratory distress.   Musculoskeletal:      Right lower leg: No edema.      Left lower leg: No edema.   Neurological:      Mental Status: She is alert. Mental status is at baseline.   Psychiatric:         Mood and Affect: Mood normal.         Behavior: Behavior normal.         Thought Content: Thought content normal.     Crusted over leg wound with mild erythema.  No warmth.  No drainage.      The following data was reviewed by: Addi Mojica MD on 10/31/2024:  Common labs          8/1/2024    08:50 8/1/2024    08:57 9/4/2024    10:09   Common Labs   Glucose 91  92  95    BUN 21  21  17    Creatinine 0.99  0.93  0.88    Sodium 141  142  140    Potassium 4.6  4.6  4.3    Chloride 107  108  103    Calcium 9.5  9.4  9.6    Total Protein 6.6  6.4  6.4    Albumin 4.3  4.1  4.2    Total Bilirubin 1.0  0.9  0.8    Alkaline Phosphatase 75  74  74    AST (SGOT) 20  19  20    ALT (SGPT) 12  14  14    WBC 4.49  4.44  5.77    Hemoglobin 12.6  12.7  13.0    Hematocrit 39.2  39.0  39.4    Platelets 234  240  277    Total Cholesterol  214  249    Triglycerides  62  86    HDL Cholesterol  75  84    LDL Cholesterol   128  150      Data reviewed : cardiology note              ASSESSMENT & PLAN     Diagnoses and all orders for this visit:    1. Wound of left lower extremity, subsequent encounter (Primary)  -     mupirocin (BACTROBAN) 2 % ointment; Apply 1 Application topically to the appropriate area as directed 2 (Two) Times a Day.  Dispense: 30 g; Refill: 0  -     Ambulatory Referral to Wound Clinic    Discussed wound care today  Using mild soap and water, a wound cleanser, saline, or sterile water to clean wound.  Starting Bactroban ointment  Continue Lasix for edema  Referring patient to wound clinic  Call if signs or symptoms of infection develop.        Health Maintenance Due   Topic Date Due     COLORECTAL CANCER SCREENING  02/04/2024    DXA SCAN  12/19/2024        Follow Up  No follow-ups on file.    Patient/family had no further questions at this time and verbalized understanding of the plan discussed today.

## 2024-11-01 ENCOUNTER — TELEPHONE (OUTPATIENT)
Dept: INTERNAL MEDICINE | Facility: CLINIC | Age: 89
End: 2024-11-01

## 2024-11-01 NOTE — TELEPHONE ENCOUNTER
"  Caller: Eliane Rao \"Chantel\"    Relationship: Self    Best call back number: [phone]     What is the best time to reach you: ANYTIME    Who are you requesting to speak with (clinical staff, provider,  specific staff member):CLINICAL      What was the call regarding: PATIENT CALLING WANTING TO KNOW WHERE THE REFERRAL FOR THE WOUND CARE WAS SENT. SHE STATED NO ONE HAS CALLED TO SCHEDULE.    Is it okay if the provider responds through MyChart: NO    "

## 2024-11-01 NOTE — TELEPHONE ENCOUNTER
Left detailed message for patient with [facility] number [phone].     OKAY FOR HUB TO SHARE INFORMATION ABOVE AND   ANY OTHER PREVIOUS MESSAGES IN THIS NOTE/CALL.

## 2024-11-04 ENCOUNTER — OFFICE VISIT (OUTPATIENT)
Dept: WOUND CARE | Facility: HOSPITAL | Age: 89
End: 2024-11-04
Payer: MEDICARE

## 2024-11-11 ENCOUNTER — OFFICE VISIT (OUTPATIENT)
Dept: WOUND CARE | Facility: HOSPITAL | Age: 89
End: 2024-11-11
Payer: MEDICARE

## 2024-11-11 PROCEDURE — G0463 HOSPITAL OUTPT CLINIC VISIT: HCPCS

## 2024-11-18 ENCOUNTER — OFFICE VISIT (OUTPATIENT)
Dept: WOUND CARE | Facility: HOSPITAL | Age: 89
End: 2024-11-18
Payer: MEDICARE

## 2024-11-25 ENCOUNTER — OFFICE VISIT (OUTPATIENT)
Dept: WOUND CARE | Facility: HOSPITAL | Age: 89
End: 2024-11-25
Payer: MEDICARE

## 2024-11-25 PROCEDURE — G0463 HOSPITAL OUTPT CLINIC VISIT: HCPCS

## 2024-12-09 ENCOUNTER — OFFICE VISIT (OUTPATIENT)
Dept: WOUND CARE | Facility: HOSPITAL | Age: 89
End: 2024-12-09
Payer: MEDICARE

## 2024-12-23 ENCOUNTER — OFFICE VISIT (OUTPATIENT)
Dept: WOUND CARE | Facility: HOSPITAL | Age: 89
End: 2024-12-23
Payer: MEDICARE

## 2024-12-23 PROCEDURE — G0463 HOSPITAL OUTPT CLINIC VISIT: HCPCS

## 2025-01-16 ENCOUNTER — OFFICE VISIT (OUTPATIENT)
Dept: WOUND CARE | Facility: HOSPITAL | Age: 89
End: 2025-01-16
Payer: MEDICARE

## 2025-01-16 PROCEDURE — G0463 HOSPITAL OUTPT CLINIC VISIT: HCPCS

## 2025-01-23 ENCOUNTER — OFFICE VISIT (OUTPATIENT)
Dept: WOUND CARE | Facility: HOSPITAL | Age: 89
End: 2025-01-23
Payer: MEDICARE

## 2025-01-23 PROCEDURE — G0463 HOSPITAL OUTPT CLINIC VISIT: HCPCS

## 2025-01-29 ENCOUNTER — TELEPHONE (OUTPATIENT)
Age: 89
End: 2025-01-29
Payer: MEDICARE

## 2025-01-29 NOTE — TELEPHONE ENCOUNTER
Patient has a Dentist apt tomorrow.She is suppose to get her teeth cleaned. But she notice that her gums have been bleeding when she is brushing her teeth. Could this be because of the Xarelto? Please advise.     She can be reached at [phone]. If patient does not answer, you can leave a detailed VM.

## 2025-01-29 NOTE — TELEPHONE ENCOUNTER
Called and did not get the patient but left a detailed message she does have a clotting disorder needs to be on a blood thinner gum bleeding could be more present with being on a blood thinner but also having gum disease could also be a problem causing bleeding as well I would defer to the dentist on what they think might be the etiology sometimes increasing oral hygiene can help.  Told her to call us back with any other questions

## 2025-01-30 ENCOUNTER — OFFICE VISIT (OUTPATIENT)
Dept: WOUND CARE | Facility: HOSPITAL | Age: 89
End: 2025-01-30
Payer: MEDICARE

## 2025-01-30 PROCEDURE — G0463 HOSPITAL OUTPT CLINIC VISIT: HCPCS

## 2025-03-11 ENCOUNTER — OFFICE VISIT (OUTPATIENT)
Dept: INTERNAL MEDICINE | Facility: CLINIC | Age: 89
End: 2025-03-11
Payer: MEDICARE

## 2025-03-11 VITALS
WEIGHT: 127 LBS | DIASTOLIC BLOOD PRESSURE: 62 MMHG | OXYGEN SATURATION: 95 % | HEART RATE: 63 BPM | HEIGHT: 62 IN | BODY MASS INDEX: 23.37 KG/M2 | SYSTOLIC BLOOD PRESSURE: 108 MMHG

## 2025-03-11 DIAGNOSIS — Z78.0 POSTMENOPAUSE: ICD-10-CM

## 2025-03-11 DIAGNOSIS — M19.042 PRIMARY OSTEOARTHRITIS OF BOTH HANDS: ICD-10-CM

## 2025-03-11 DIAGNOSIS — M54.2 NECK PAIN: ICD-10-CM

## 2025-03-11 DIAGNOSIS — D68.51 FACTOR 5 LEIDEN MUTATION, HETEROZYGOUS: ICD-10-CM

## 2025-03-11 DIAGNOSIS — R19.5 LOOSE STOOLS: ICD-10-CM

## 2025-03-11 DIAGNOSIS — R35.1 NOCTURIA: ICD-10-CM

## 2025-03-11 DIAGNOSIS — I10 PRIMARY HYPERTENSION: Primary | ICD-10-CM

## 2025-03-11 DIAGNOSIS — S81.802D WOUND OF LEFT LOWER EXTREMITY, SUBSEQUENT ENCOUNTER: ICD-10-CM

## 2025-03-11 DIAGNOSIS — M19.041 PRIMARY OSTEOARTHRITIS OF BOTH HANDS: ICD-10-CM

## 2025-03-11 NOTE — PROGRESS NOTES
Addi Mojica M.D.  Internal Medicine  [facility]  [address], [address]  [place] [address]  [phone]      Chief Complaint  Hypertension (6 mth follow up /)    SUBJECTIVE    History of Present Illness    Eliane Rao is a 89 y.o. female with past medical history significant for factor V Leiden, DVT/PE, PFO, hypertension, hyperlipidemia who presents to the office today as an established patient that last saw me on 10/31/2024.     History of Present Illness   She mentions feeling a general lack of energy lately, which she attributes to her age. She hasn't been very active because of back pain, which she managed with rehab. She doesn't feel short of breath with mild activities like walking, but she does get out of breath if she tries to walk faster. She hasn't experienced any chest pain, pressure, or lightheadedness.    She is unhappy with the effects of Xarelto. She had a skin tear on her lower leg that took about 6 weeks to heal with the help of a wound specialist. Recently, she also had a minor cut on her hand that bled a lot because it was near a vein, but it has since healed after being checked at the wound center.    She is frustrated with having to get up 2-3 times a night to go to the bathroom. She doesn't have any pain or burning when urinating. Her current medications include metoprolol, amlodipine, losartan, and Xarelto in the morning, and metoprolol, amlodipine, montelukast, and vitamin D3 at night.    She has regular bowel movements but sometimes experiences loose stools or diarrhea in the morning, which she manages with Pepto-Bismol. Her bowel movements are not particularly large.    She received injections in both thumbs about a year and a half ago for arthritis but doesn't take any oral medications for it. She has trigger fingers that were checked by a hand specialist. Her fingers don't get red, stiff, or hot, and the severity of her symptoms depends on her  "activities.    She occasionally gets a \"crick\" in her neck when she moves her head a certain way, but it goes away on its own. This happens on both sides.    Her cardiologist recommended that she see a pulmonologist to be on the safe side.      Review of Systems   Constitutional:  Negative for chills, diaphoresis, fatigue and fever.   HENT:  Negative for congestion and sore throat.    Respiratory:  Positive for cough.    Cardiovascular:  Negative for chest pain.   Gastrointestinal:  Negative for abdominal pain, nausea and vomiting.   Genitourinary:  Negative for dysuria.   Musculoskeletal:  Positive for myalgias and neck pain.   Skin:  Positive for rash.   Neurological:  Positive for numbness. Negative for weakness and headaches.     Allergies   Allergen Reactions    Sulfa Antibiotics Nausea Only and GI Intolerance        Outpatient Medications Marked as Taking for the 3/11/25 encounter (Office Visit) with Addi Mojica MD   Medication Sig Dispense Refill    amLODIPine (NORVASC) 5 MG tablet TAKE ONE TABLET BY MOUTH TWICE A  tablet 3    Lactobacillus Rhamnosus, GG, (CULTURELLE PO) Take  by mouth Daily.      losartan (COZAAR) 50 MG tablet TAKE 1 TABLET BY MOUTH DAILY 90 tablet 2    metoprolol tartrate (LOPRESSOR) 50 MG tablet TAKE ONE TABLET BY MOUTH TWICE A  tablet 3    montelukast (SINGULAIR) 10 MG tablet Take 1 tablet by mouth Every Night. 90 tablet 1    mupirocin (BACTROBAN) 2 % ointment Apply 1 Application topically to the appropriate area as directed 2 (Two) Times a Day. 30 g 0    rivaroxaban (Xarelto) 20 MG tablet TAKE 1 TABLET BY MOUTH DAILY 90 tablet 3    [DISCONTINUED] furosemide (LASIX) 20 MG tablet TAKE 1 TABLET BY MOUTH DAILY 30 tablet 0        Past Medical History:   Diagnosis Date    Allergic Sulpha-10 yrs ago    Arthritis 7 yrs ago    Asthma     Cataract 10 yrs ago    Chest pain     Chronic renal insufficiency     Clotting disorder     Deep vein thrombosis     Diastolic dysfunction     " "grade 1    Diverticulosis     Dyspnea     Dysuria     Hiatal hernia     History of cataract     Hyperlipidemia     Hypertension     IBS (irritable bowel syndrome)     Insomnia     Mitral regurgitation     Multiple polyps of sigmoid colon     Osteopenia     Osteoporosis     Patent foramen ovale     Pulmonary embolism     Simple goiter     Tricuspid regurgitation     UTI (urinary tract infection)      Past Surgical History:   Procedure Laterality Date    ADENOIDECTOMY  1940    CATARACT EXTRACTION, BILATERAL  2009    COLONOSCOPY  11/14/2013    EYE SURGERY  Cadracts removed    2015    TONSILLECTOMY      TUBAL ABDOMINAL LIGATION       Family History   Problem Relation Age of Onset    Cancer Mother         Mother    Arthritis Mother         Mother    Cancer Maternal Aunt         Aunt-bladder    Diabetes Other     reports that she quit smoking about 35 years ago. Her smoking use included cigarettes. She started smoking about 70 years ago. She has a 17.7 pack-year smoking history. She has been exposed to tobacco smoke. She has never used smokeless tobacco. She reports current alcohol use of about 5.0 standard drinks of alcohol per week. She reports that she does not use drugs.    OBJECTIVE    Vital Signs:   /62   Pulse 63   Ht 157.5 cm (62.01\")   Wt 57.6 kg (127 lb)   SpO2 95%   BMI 23.22 kg/m²     Physical Exam  Constitutional:       Appearance: Normal appearance.   Cardiovascular:      Rate and Rhythm: Normal rate and regular rhythm.      Heart sounds: Normal heart sounds. No murmur heard.  Pulmonary:      Effort: Pulmonary effort is normal.      Breath sounds: Normal breath sounds.   Musculoskeletal:      Right hand: Deformity present.      Left hand: Deformity present.      Cervical back: Full passive range of motion without pain.   Skin:     General: Skin is warm and dry.   Neurological:      Mental Status: She is alert.   Psychiatric:         Mood and Affect: Mood normal.         Behavior: Behavior normal.  "        Thought Content: Thought content normal.          Physical Exam      The following data was reviewed by: Addi Mojica MD on 03/11/2025:  CMP          8/1/2024    08:50 8/1/2024    08:57 9/4/2024    10:09 3/11/2025    14:28   CMP   Glucose 91  92  95  116    BUN 21  21  17  25    Creatinine 0.99  0.93  0.88  1.05    EGFR 55.3  59.6  63.3  51.2    Sodium 141  142  140  141    Potassium 4.6  4.6  4.3  4.2    Chloride 107  108  103  104    Calcium 9.5  9.4  9.6  9.8    Total Protein 6.6  6.4  6.4  6.6    Albumin 4.3  4.1  4.2  4.0    Globulin 2.3  2.3  2.2  2.6    Total Bilirubin 1.0  0.9  0.8  0.8    Alkaline Phosphatase 75  74  74  71    AST (SGOT) 20  19  20  18    ALT (SGPT) 12  14  14  13    Albumin/Globulin Ratio 1.9  1.8  1.9  1.5    BUN/Creatinine Ratio 21.2  22.6  19.3  23.8      CBC w/diff          8/1/2024    08:50 8/1/2024    08:57 9/4/2024    10:09   CBC w/Diff   WBC 4.49  4.44  5.77    RBC 4.12  4.14  4.20    Hemoglobin 12.6  12.7  13.0    Hematocrit 39.2  39.0  39.4    MCV 95.1  94.2  93.8    MCH 30.6  30.7  31.0    MCHC 32.1  32.6  33.0    RDW 13.0  12.9  12.3    Platelets 234  240  277    Neutrophil Rel %  51.0  50.5    Lymphocyte Rel %  25.2  30.0    Monocyte Rel %  11.9  10.1    Eosinophil Rel %  11.0  8.5    Basophil Rel %  0.7  0.7      Lipid Panel          8/1/2024    08:57 9/4/2024    10:09   Lipid Panel   Total Cholesterol 214  249    Triglycerides 62  86    HDL Cholesterol 75  84    VLDL Cholesterol 11  15    LDL Cholesterol  128  150      TSH          8/1/2024    08:50   TSH   TSH 2.690        Data reviewed : recent cardiology note and wound care notes              ASSESSMENT & PLAN        Primary hypertension  Hypertension is stable and controlled  Continue current treatment regimen.  Blood pressure will be reassessed in 6 months.    Orders:    Comprehensive Metabolic Panel    CBC & Differential    Wound of left lower extremity, subsequent encounter  Followed with wound care. Skin tear  now healed       Factor 5 Leiden mutation, heterozygous  Xarelto prescribed by her cardiologist       Nocturia   Water-based lubricants are often used for short-term relief from vaginal dryness. Vaginal atrophy may affect postmenopausal women and can be associated with urinary symptoms such as urgency, increased frequency, and nocturia.  Discussed trying a long-acting vaginal moisturizer.       Postmenopause    Orders:    DEXA Bone Density Axial; Future    Loose stools  - Loose bowel movements and morning diarrhea  - Incorporate stool bulking agent such as psyllium       Primary osteoarthritis of both hands  - Arthritis in thumbs and fingers with significant joint destruction  - previously received injections by hand specialist  - Previous inflammatory markers for rheumatoid arthritis checked, no further workup needed  - Monitor symptoms and report any worsening and consider referring to hand specialist again       Neck pain  - Occasional neck pain, possibly due to muscle strain or arthritis  - Perform gentle neck stretches and report any changes            Assessment & Plan        Health Maintenance Due   Topic Date Due    COLORECTAL CANCER SCREENING  02/04/2024    DXA SCAN  12/19/2024        Follow Up  Return in about 6 months (around 9/11/2025) for Medicare Wellness.    Patient/family had no further questions at this time and verbalized understanding of the plan discussed today.     Patient or patient representative verbalized consent for the use of Ambient Listening during the visit with  Addi Mojica MD for chart documentation. 3/12/2025  09:59 EDT

## 2025-03-12 LAB
ALBUMIN SERPL-MCNC: 4 G/DL (ref 3.5–5.2)
ALBUMIN/GLOB SERPL: 1.5 G/DL
ALP SERPL-CCNC: 71 U/L (ref 39–117)
ALT SERPL-CCNC: 13 U/L (ref 1–33)
AST SERPL-CCNC: 18 U/L (ref 1–32)
BASOPHILS # BLD AUTO: 0.03 10*3/MM3 (ref 0–0.2)
BASOPHILS NFR BLD AUTO: 0.5 % (ref 0–1.5)
BILIRUB SERPL-MCNC: 0.8 MG/DL (ref 0–1.2)
BUN SERPL-MCNC: 25 MG/DL (ref 8–23)
BUN/CREAT SERPL: 23.8 (ref 7–25)
CALCIUM SERPL-MCNC: 9.8 MG/DL (ref 8.6–10.5)
CHLORIDE SERPL-SCNC: 104 MMOL/L (ref 98–107)
CO2 SERPL-SCNC: 23.6 MMOL/L (ref 22–29)
CREAT SERPL-MCNC: 1.05 MG/DL (ref 0.57–1)
EGFRCR SERPLBLD CKD-EPI 2021: 51.2 ML/MIN/1.73
EOSINOPHIL # BLD AUTO: 0.3 10*3/MM3 (ref 0–0.4)
EOSINOPHIL NFR BLD AUTO: 5.2 % (ref 0.3–6.2)
ERYTHROCYTE [DISTWIDTH] IN BLOOD BY AUTOMATED COUNT: 12.5 % (ref 12.3–15.4)
GLOBULIN SER CALC-MCNC: 2.6 GM/DL
GLUCOSE SERPL-MCNC: 116 MG/DL (ref 65–99)
HCT VFR BLD AUTO: 38.7 % (ref 34–46.6)
HGB BLD-MCNC: 12.6 G/DL (ref 12–15.9)
IMM GRANULOCYTES # BLD AUTO: 0.01 10*3/MM3 (ref 0–0.05)
IMM GRANULOCYTES NFR BLD AUTO: 0.2 % (ref 0–0.5)
LYMPHOCYTES # BLD AUTO: 1.61 10*3/MM3 (ref 0.7–3.1)
LYMPHOCYTES NFR BLD AUTO: 28 % (ref 19.6–45.3)
MCH RBC QN AUTO: 31 PG (ref 26.6–33)
MCHC RBC AUTO-ENTMCNC: 32.6 G/DL (ref 31.5–35.7)
MCV RBC AUTO: 95.1 FL (ref 79–97)
MONOCYTES # BLD AUTO: 0.66 10*3/MM3 (ref 0.1–0.9)
MONOCYTES NFR BLD AUTO: 11.5 % (ref 5–12)
NEUTROPHILS # BLD AUTO: 3.13 10*3/MM3 (ref 1.7–7)
NEUTROPHILS NFR BLD AUTO: 54.6 % (ref 42.7–76)
NRBC BLD AUTO-RTO: 0 /100 WBC (ref 0–0.2)
PLATELET # BLD AUTO: 265 10*3/MM3 (ref 140–450)
POTASSIUM SERPL-SCNC: 4.2 MMOL/L (ref 3.5–5.2)
PROT SERPL-MCNC: 6.6 G/DL (ref 6–8.5)
RBC # BLD AUTO: 4.07 10*6/MM3 (ref 3.77–5.28)
SODIUM SERPL-SCNC: 141 MMOL/L (ref 136–145)
WBC # BLD AUTO: 5.74 10*3/MM3 (ref 3.4–10.8)

## 2025-03-23 DIAGNOSIS — I10 PRIMARY HYPERTENSION: Chronic | ICD-10-CM

## 2025-03-24 RX ORDER — LOSARTAN POTASSIUM 50 MG/1
50 TABLET ORAL DAILY
Qty: 90 TABLET | Refills: 2 | Status: SHIPPED | OUTPATIENT
Start: 2025-03-24

## 2025-03-26 ENCOUNTER — HOSPITAL ENCOUNTER (OUTPATIENT)
Dept: BONE DENSITY | Facility: HOSPITAL | Age: 89
Discharge: HOME | End: 2025-03-26
Admitting: STUDENT IN AN ORGANIZED HEALTH CARE EDUCATION/TRAINING PROGRAM
Payer: MEDICARE

## 2025-03-26 DIAGNOSIS — Z78.0 POSTMENOPAUSE: ICD-10-CM

## 2025-03-26 PROCEDURE — 77080 DXA BONE DENSITY AXIAL: CPT

## 2025-05-05 RX ORDER — METOPROLOL TARTRATE 50 MG
50 TABLET ORAL 2 TIMES DAILY
Qty: 180 TABLET | Refills: 1 | Status: SHIPPED | OUTPATIENT
Start: 2025-05-05

## 2025-05-09 ENCOUNTER — OFFICE VISIT (OUTPATIENT)
Dept: CARDIOLOGY | Age: 89
End: 2025-05-09
Payer: MEDICARE

## 2025-05-09 VITALS
HEIGHT: 62 IN | SYSTOLIC BLOOD PRESSURE: 124 MMHG | BODY MASS INDEX: 23.37 KG/M2 | OXYGEN SATURATION: 98 % | DIASTOLIC BLOOD PRESSURE: 68 MMHG | WEIGHT: 127 LBS | HEART RATE: 59 BPM

## 2025-05-09 DIAGNOSIS — E78.00 HYPERCHOLESTEROLEMIA: Primary | ICD-10-CM

## 2025-05-09 PROCEDURE — 93000 ELECTROCARDIOGRAM COMPLETE: CPT | Performed by: NURSE PRACTITIONER

## 2025-05-09 PROCEDURE — 1160F RVW MEDS BY RX/DR IN RCRD: CPT | Performed by: NURSE PRACTITIONER

## 2025-05-09 PROCEDURE — 99214 OFFICE O/P EST MOD 30 MIN: CPT | Performed by: NURSE PRACTITIONER

## 2025-05-09 PROCEDURE — 1159F MED LIST DOCD IN RCRD: CPT | Performed by: NURSE PRACTITIONER

## 2025-05-09 NOTE — PROGRESS NOTES
Date of Office Visit: 2025  Encounter Provider: FEMI Clifford  Place of Service: [facility] CARDIOLOGY  Patient Name: Eliane Rao  :1935    Chief complaint: History of DVT, hypertension    HPI: Eliane Rao is a 89 y.o. female who is a patient of Dr. Juarez and is new to me today.  She has a history of pulmonary hypertension, DVT and PE, PFO, hyperlipidemia and factor V Leiden.  Last in office 6 months ago.  At that time she was doing well no changes related.    She comes in today for 6-month follow-up.  She denies any chest pain, pressure or tightness.  She has noted that she has been wheezing a little bit lately.  Some leg cramps at night in bed.  She takes over-the-counter magnesium for this.  Blood pressure has been well-controlled.    Previous testing and notes have been reviewed by me.      Latest Reference Range & Units Most Recent   Sodium 136 - 145 mmol/L 141  3/11/25 14:28   Potassium 3.5 - 5.2 mmol/L 4.2  3/11/25 14:28   Chloride 98 - 107 mmol/L 104  3/11/25 14:28   CO2 22.0 - 29.0 mmol/L 23.6  3/11/25 14:28   CO2 22 - 29 mmol/L 24 (E)  22 05:54   Anion Gap 5.0 - 15.0 mmol/L 10.8  23 14:28   BUN 8 - 23 mg/dL 25 (H)  3/11/25 14:28   Creatinine 0.57 - 1.00 mg/dL 1.05 (H)  3/11/25 14:28   BUN/Creatinine Ratio 7.0 - 25.0  23.8  3/11/25 14:28   eGFR >60.0 mL/min/1.73 52.7 (L)  23 14:28   EGFR Result >60.0 mL/min/1.73 51.2 (L)  3/11/25 14:28   Est GFR by Clearance >60 /1.73 m2 >60 (E)  22 05:54   Glucose 65 - 99 mg/dL 116 (H)  3/11/25 14:28   Calcium 8.6 - 10.5 mg/dL 9.8  3/11/25 14:28   Magnesium 1.6 - 2.4 mg/dL 2.2  23 13:20   Alkaline Phosphatase 39 - 117 U/L 71  3/11/25 14:28   Total Protein 6.0 - 8.5 g/dL 6.6  3/11/25 14:28   Albumin 3.5 - 5.2 g/dL 4.0  3/11/25 14:28   Globulin gm/dL 2.8  23 14:28   A/G Ratio g/dL 1.5  3/11/25 14:28   AST (SGOT) 1 - 32 U/L 18  3/11/25 14:28   ALT (SGPT) 1 - 33 U/L 13  3/11/25  14:28   Total Bilirubin 0.0 - 1.2 mg/dL 0.8  3/11/25 14:28   (H): Data is abnormally high  (L): Data is abnormally low  (E): External lab result     Latest Reference Range & Units Most Recent   Total Cholesterol 0 - 200 mg/dL 249 (H)  24 10:09   HDL Cholesterol 40 - 60 mg/dL 84 (H)  24 10:09   LDL Cholesterol  0 - 100 mg/dL 150 (H)  24 10:09   VLDL Cholesterol mg/dL 16.2  7/15/20 10:02   Triglycerides 0 - 150 mg/dL 86  24 10:09   Chol/HDL Ratio  2.65  21 09:37   LDL/HDL Ratio  1.50  8/10/23 09:26   VLDL Cholesterol Genaro 5 - 40 mg/dL 15  24 10:09   (H): Data is abnormally high  Past Medical History:   Diagnosis Date    Allergic Sulpha-10 yrs ago    Arthritis 7 yrs ago    Asthma     Cataract 10 yrs ago    Chest pain     Chronic renal insufficiency     Clotting disorder     Deep vein thrombosis     Diastolic dysfunction     grade 1    Diverticulosis     Dyspnea     Dysuria     Hiatal hernia     History of cataract     Hyperlipidemia     Hypertension     IBS (irritable bowel syndrome)     Insomnia     Mitral regurgitation     Multiple polyps of sigmoid colon     Osteopenia     Osteoporosis     Patent foramen ovale     Pulmonary embolism     Simple goiter     Tricuspid regurgitation     UTI (urinary tract infection)        Past Surgical History:   Procedure Laterality Date    ADENOIDECTOMY  1940    CATARACT EXTRACTION, BILATERAL      COLONOSCOPY  2013    EYE SURGERY  Cadracts removed    2015    TONSILLECTOMY      TUBAL ABDOMINAL LIGATION         Social History     Socioeconomic History    Marital status:    Tobacco Use    Smoking status: Former     Current packs/day: 0.00     Average packs/day: 0.5 packs/day for 35.3 years (17.7 ttl pk-yrs)     Types: Cigarettes     Start date: 1954     Quit date: 1990     Years since quittin.3     Passive exposure: Past    Smokeless tobacco: Never    Tobacco comments:     Very light smoker beginning in college stopping in     Vaping Use    Vaping status: Never Used   Substance and Sexual Activity    Alcohol use: Yes     Alcohol/week: 5.0 standard drinks of alcohol     Types: 5 Glasses of wine per week     Comment: Occasional/caffeine use    Drug use: Never    Sexual activity: Not Currently     Partners: Male     Birth control/protection: Tubal ligation, Pill, Surgical       Family History   Problem Relation Age of Onset    Cancer Mother         Mother    Arthritis Mother         Mother    Cancer Maternal Aunt         Aunt-bladder    Diabetes Other        Review of Systems   Constitutional: Negative for diaphoresis and malaise/fatigue.   Cardiovascular:  Negative for chest pain, claudication, dyspnea on exertion, irregular heartbeat, leg swelling, near-syncope, orthopnea, palpitations, paroxysmal nocturnal dyspnea and syncope.   Respiratory:  Positive for wheezing. Negative for cough, shortness of breath and sleep disturbances due to breathing.    Musculoskeletal:  Negative for falls.   Neurological:  Negative for dizziness and weakness.   Psychiatric/Behavioral:  Negative for altered mental status and substance abuse.        Allergies   Allergen Reactions    Sulfa Antibiotics Nausea Only and GI Intolerance         Current Outpatient Medications:     amLODIPine (NORVASC) 5 MG tablet, TAKE ONE TABLET BY MOUTH TWICE A DAY, Disp: 180 tablet, Rfl: 3    Lactobacillus Rhamnosus, GG, (CULTURELLE PO), Take  by mouth Daily., Disp: , Rfl:     losartan (COZAAR) 50 MG tablet, TAKE 1 TABLET BY MOUTH DAILY, Disp: 90 tablet, Rfl: 2    metoprolol tartrate (LOPRESSOR) 50 MG tablet, Take 1 tablet by mouth 2 (Two) Times a Day., Disp: 180 tablet, Rfl: 1    montelukast (SINGULAIR) 10 MG tablet, Take 1 tablet by mouth Every Night., Disp: 90 tablet, Rfl: 1    mupirocin (BACTROBAN) 2 % ointment, Apply 1 Application topically to the appropriate area as directed 2 (Two) Times a Day., Disp: 30 g, Rfl: 0    rivaroxaban (Xarelto) 20 MG tablet, TAKE 1 TABLET BY  "MOUTH DAILY, Disp: 90 tablet, Rfl: 3        Objective:     Vitals:    05/09/25 0926   BP: 124/68   Pulse: 59   SpO2: 98%   Weight: 57.6 kg (127 lb)   Height: 157.5 cm (62.01\")     Body mass index is 23.22 kg/m².    PHYSICAL EXAM:    Constitutional:       General: Not in acute distress.     Appearance: Normal appearance. Well-developed.   Eyes:      Pupils: Pupils are equal, round, and reactive to light.   HENT:      Head: Normocephalic.   Neck:      Vascular: No carotid bruit or JVD.   Pulmonary:      Effort: Pulmonary effort is normal. No tachypnea.      Breath sounds: Normal breath sounds. Examination of the right-middle field reveals wheezing. Examination of the left-middle field reveals wheezing. Examination of the right-lower field reveals wheezing. Examination of the left-lower field reveals wheezing. No wheezing. No rales.   Cardiovascular:      Normal rate. Regular rhythm.      No gallop.    Pulses:     Intact distal pulses.   Edema:     Peripheral edema absent.   Abdominal:      General: Bowel sounds are normal.      Palpations: Abdomen is soft.      Tenderness: There is no abdominal tenderness.   Musculoskeletal: Normal range of motion.      Cervical back: Normal range of motion and neck supple. No edema. Skin:     General: Skin is warm and dry.   Neurological:      Mental Status: Alert and oriented to person, place, and time.           ECG 12 Lead    Date/Time: 5/9/2025 1:57 PM  Performed by: Fernanda Juares APRN    Authorized by: Fernanda Juares APRN  Comparison: compared with previous ECG from 10/24/2024  Similar to previous ECG  Rhythm: sinus rhythm  Rate: normal  QRS axis: normal    Clinical impression: normal ECG        Lipid Panel          8/1/2024    08:57 9/4/2024    10:09   Lipid Panel   Total Cholesterol 214  249    Triglycerides 62  86    HDL Cholesterol 75  84    VLDL Cholesterol 11  15    LDL Cholesterol  128  150          Assessment/Plan:      1.  Essential hypertension blood pressure " controlled on current regimen continue amlodipine 5 mg daily, losartan 50 mg daily metoprolol tartrate 50 mg twice a day    2.  History of pulmonary embolism/DVT and factor V Leiden-remains on anticoagulation with Xarelto.  Some bruising but no issues with significant bleeding      Follow-up in 6 months sooner if needed         Your medication list            Accurate as of May 9, 2025  9:52 AM. If you have any questions, ask your nurse or doctor.                CONTINUE taking these medications        Instructions Last Dose Given Next Dose Due   amLODIPine 5 MG tablet  Commonly known as: NORVASC      TAKE ONE TABLET BY MOUTH TWICE A DAY       CULTURELLE PO      Take  by mouth Daily.       losartan 50 MG tablet  Commonly known as: COZAAR      TAKE 1 TABLET BY MOUTH DAILY       metoprolol tartrate 50 MG tablet  Commonly known as: LOPRESSOR      Take 1 tablet by mouth 2 (Two) Times a Day.       montelukast 10 MG tablet  Commonly known as: SINGULAIR      Take 1 tablet by mouth Every Night.       mupirocin 2 % ointment  Commonly known as: BACTROBAN      Apply 1 Application topically to the appropriate area as directed 2 (Two) Times a Day.       Xarelto 20 MG tablet  Generic drug: rivaroxaban      TAKE 1 TABLET BY MOUTH DAILY                  As always, it has been a pleasure to participate in your patient's care.      Sincerely,     Fernanda KAHN

## 2025-05-15 RX ORDER — METOPROLOL TARTRATE 50 MG
50 TABLET ORAL 2 TIMES DAILY
Qty: 180 TABLET | Refills: 3 | Status: SHIPPED | OUTPATIENT
Start: 2025-05-15

## 2025-06-13 ENCOUNTER — PATIENT ROUNDING (BHMG ONLY) (OUTPATIENT)
Dept: URGENT CARE | Facility: CLINIC | Age: 89
End: 2025-06-13
Payer: MEDICARE

## 2025-06-13 NOTE — ED NOTES
Thank you for letting us care for you in your recent visit to our urgent care center. We would love to hear about your experience with us. Was this the first time you have visited our location?    We’re always looking for ways to make our patients’ experiences even better. Do you have any recommendations on ways we may improve?     I appreciate you taking the time to respond. Please be on the lookout for a survey about your recent visit from [employer] via text or email. We would greatly appreciate if you could fill that out and turn it back in. We want your voice to be heard and we value your feedback.   Thank you for choosing [facility] for your healthcare needs.

## 2025-07-29 ENCOUNTER — OFFICE VISIT (OUTPATIENT)
Dept: INTERNAL MEDICINE | Facility: CLINIC | Age: 89
End: 2025-07-29
Payer: MEDICARE

## 2025-07-29 VITALS
TEMPERATURE: 98.2 F | SYSTOLIC BLOOD PRESSURE: 130 MMHG | DIASTOLIC BLOOD PRESSURE: 68 MMHG | BODY MASS INDEX: 22.56 KG/M2 | HEART RATE: 96 BPM | HEIGHT: 62 IN | WEIGHT: 122.6 LBS | OXYGEN SATURATION: 96 %

## 2025-07-29 DIAGNOSIS — N30.01 ACUTE CYSTITIS WITH HEMATURIA: Primary | ICD-10-CM

## 2025-07-29 DIAGNOSIS — K58.0 IRRITABLE BOWEL SYNDROME WITH DIARRHEA: ICD-10-CM

## 2025-07-29 DIAGNOSIS — R31.9 HEMATURIA, UNSPECIFIED TYPE: ICD-10-CM

## 2025-07-29 DIAGNOSIS — R30.9 PAINFUL URINATION: ICD-10-CM

## 2025-07-29 LAB
BILIRUB BLD-MCNC: ABNORMAL MG/DL
CLARITY, POC: ABNORMAL
COLOR UR: ABNORMAL
EXPIRATION DATE: ABNORMAL
GLUCOSE UR STRIP-MCNC: NEGATIVE MG/DL
KETONES UR QL: ABNORMAL
LEUKOCYTE EST, POC: ABNORMAL
Lab: ABNORMAL
NITRITE UR-MCNC: POSITIVE MG/ML
PH UR: 6 [PH] (ref 5–8)
PROT UR STRIP-MCNC: ABNORMAL MG/DL
RBC # UR STRIP: ABNORMAL /UL
SP GR UR: 1.02 (ref 1–1.03)
UROBILINOGEN UR QL: ABNORMAL

## 2025-07-29 RX ORDER — NITROFURANTOIN 25; 75 MG/1; MG/1
100 CAPSULE ORAL 2 TIMES DAILY
Qty: 14 CAPSULE | Refills: 0 | Status: SHIPPED | OUTPATIENT
Start: 2025-07-29 | End: 2025-08-05

## 2025-07-29 NOTE — PROGRESS NOTES
"Urinary Frequency, Abdominal Pain, Diarrhea, and Blood in Urine      HPI  Eliane Rao is a 89 y.o. female RTC in acute care ;  \"I am having a bit of both at the same time'. Has some mild diarrhea that is not uncommon for pt. The difference now is occurring some first AM, but does not wake up. Long term will have some issues with post meal loose stools. Having 2-3 BM daily.  No blood in stool. No fevers. Stools are loose, not watery.    No recent antibiotics, last time was 4-5 months ago with dental work.     Started with urinary sx 3-4 days ago, noticed some lower abdominal pain.  \"Just a little' pain with urination.  Rare 'dot' of blood in urine, in bowl.  No new back pain associated.  No hx of kidney stones.  Had UTI's in past, this feels somewhat different but did not have pain in lower abdomen that has now.       Review of Systems   Constitutional: Negative for chills and fever.   Musculoskeletal:  Negative for back pain.   Gastrointestinal:  Positive for abdominal pain (lower abdomen/ pelvis region), change in bowel habit and diarrhea. Negative for nausea and vomiting.   Genitourinary:  Positive for dysuria, frequency, hematuria and nocturia.   Psychiatric/Behavioral:  Negative for altered mental status.        The following portions of the patient's history were reviewed and updated as appropriate: allergies, current medications, past medical history, past social history, and problem list.      Current Outpatient Medications:     amLODIPine (NORVASC) 5 MG tablet, TAKE ONE TABLET BY MOUTH TWICE A DAY, Disp: 180 tablet, Rfl: 3    Lactobacillus Rhamnosus, GG, (CULTURELLE PO), Take  by mouth Daily., Disp: , Rfl:     losartan (COZAAR) 50 MG tablet, TAKE 1 TABLET BY MOUTH DAILY, Disp: 90 tablet, Rfl: 2    metoprolol tartrate (LOPRESSOR) 50 MG tablet, Take 1 tablet by mouth 2 (Two) Times a Day., Disp: 180 tablet, Rfl: 3    montelukast (SINGULAIR) 10 MG tablet, Take 1 tablet by mouth Every Night., Disp: 90 " "tablet, Rfl: 1    mupirocin (BACTROBAN) 2 % ointment, Apply 1 Application topically to the appropriate area as directed 2 (Two) Times a Day., Disp: 30 g, Rfl: 0    rivaroxaban (Xarelto) 20 MG tablet, TAKE 1 TABLET BY MOUTH DAILY, Disp: 90 tablet, Rfl: 3    nitrofurantoin, macrocrystal-monohydrate, (Macrobid) 100 MG capsule, Take 1 capsule by mouth 2 (Two) Times a Day for 7 days., Disp: 14 capsule, Rfl: 0    Vitals:    07/29/25 1451   BP: 130/68   BP Location: Left arm   Patient Position: Sitting   Cuff Size: Adult   Pulse: 96   Temp: 98.2 °F (36.8 °C)   TempSrc: Infrared   SpO2: 96%   Weight: 55.6 kg (122 lb 9.6 oz)   Height: 157.5 cm (62.01\")     Body mass index is 22.42 kg/m².      Physical Exam  Constitutional:       General: She is not in acute distress.     Appearance: Normal appearance. She is normal weight. She is not ill-appearing or toxic-appearing.   HENT:      Head: Normocephalic and atraumatic.   Eyes:      General: No scleral icterus.  Cardiovascular:      Rate and Rhythm: Normal rate and regular rhythm.      Heart sounds: No murmur heard.     No friction rub. No gallop.   Pulmonary:      Effort: Pulmonary effort is normal. No respiratory distress.      Breath sounds: No wheezing, rhonchi or rales.   Abdominal:      General: Bowel sounds are normal.      Palpations: Abdomen is soft.      Tenderness: There is abdominal tenderness in the suprapubic area and left lower quadrant. There is no right CVA tenderness, left CVA tenderness, guarding or rebound.   Musculoskeletal:      Right lower leg: No edema.      Left lower leg: No edema.   Neurological:      General: No focal deficit present.      Mental Status: She is alert.      Cranial Nerves: No cranial nerve deficit, dysarthria or facial asymmetry.      Gait: Gait normal.   Psychiatric:         Attention and Perception: Attention normal.         Mood and Affect: Mood normal.         Behavior: Behavior normal.         Thought Content: Thought content " normal.         Results for orders placed or performed in visit on 07/29/25   POC Urinalysis Dipstick, Automated    Collection Time: 07/29/25  2:45 PM    Specimen: Urine   Result Value Ref Range    Color Dark Yellow Yellow, Straw, Dark Yellow, Kay    Clarity, UA Cloudy (A) Clear    Specific Gravity  1.025 1.005 - 1.030    pH, Urine 6.0 5.0 - 8.0    Leukocytes Large (3+) (A) Negative    Nitrite, UA Positive (A) Negative    Protein, POC 30 mg/dL (A) Negative mg/dL    Glucose, UA Negative Negative mg/dL    Ketones, UA 15 mg/dL (A) Negative    Urobilinogen, UA 0.2 E.U./dL Normal, 0.2 E.U./dL    Bilirubin Small (1+) (A) Negative    Blood, UA Moderate (A) Negative    Lot Number [id number]     Expiration Date 12/31/2025        Assessment/ Plan  Diagnoses and all orders for this visit:    Acute cystitis with hematuria  -     Urine Culture - Urine, Urine, Clean Catch  -     nitrofurantoin, macrocrystal-monohydrate, (Macrobid) 100 MG capsule; Take 1 capsule by mouth 2 (Two) Times a Day for 7 days.    Painful urination  -     POC Urinalysis Dipstick, Automated  -     Urine Culture - Urine, Urine, Clean Catch    Hematuria, unspecified type  -     UA / M With / Rflx Culture([facility] ONLY) - Urine, Clean Catch  -     Urine Culture - Urine, Urine, Clean Catch    Irritable bowel syndrome with diarrhea        Return for Next scheduled follow up.      Discussion:  Eliane Rao is a 89 y.o. female with IBS with long-term loose stools RTC in acute care (new patient issue to examiner with 3-4 days of lower abdominal and pelvic discomfort along with some urinary urgency/dysuria and rare tiny visible hematuria.  Patient notes some progression of her IBS-D SX with loose stool in a.m. when first gets up associated (is not waking patient up).  Exam today notable for lower abdomen suprapubic and LLQ tenderness without rebound, otherwise exam nonfocal.  U dip in office with pyuria/hematuria/nitrite.    Provisional Dx today of acute  cystitis.  DDx would include diverticulitis given pattern of abdominal pain, though thought less likely without rebound tenderness and clear abnormal urinalysis.    - Treat acute cystitis empirically with nitrofurantoin twice daily X 7 days.    -Await Uculture, tailor ABX as needed.    - Repeat UA 10 days for clearance.  - Patient knows to call if SX fails to improve/fever/blood in stool/other new symptoms.

## 2025-08-04 ENCOUNTER — RESULTS FOLLOW-UP (OUTPATIENT)
Dept: INTERNAL MEDICINE | Facility: CLINIC | Age: 89
End: 2025-08-04
Payer: MEDICARE

## 2025-08-04 LAB
APPEARANCE UR: ABNORMAL
BACTERIA #/AREA URNS HPF: ABNORMAL /[HPF]
BACTERIA UR CULT: ABNORMAL
BACTERIA UR CULT: ABNORMAL
BILIRUB UR QL STRIP: NEGATIVE
CASTS URNS QL MICRO: ABNORMAL /LPF
COLOR UR: YELLOW
EPI CELLS #/AREA URNS HPF: ABNORMAL /HPF (ref 0–10)
GLUCOSE UR QL STRIP: NEGATIVE
HGB UR QL STRIP: ABNORMAL
KETONES UR QL STRIP: ABNORMAL
LEUKOCYTE ESTERASE UR QL STRIP: ABNORMAL
MICRO URNS: ABNORMAL
MUCOUS THREADS URNS QL MICRO: PRESENT
NITRITE UR QL STRIP: NEGATIVE
OTHER ANTIBIOTIC SUSC ISLT: ABNORMAL
PH UR STRIP: 5.5 [PH] (ref 5–7.5)
PROT UR QL STRIP: ABNORMAL
RBC #/AREA URNS HPF: ABNORMAL /HPF (ref 0–2)
SP GR UR STRIP: 1.02 (ref 1–1.03)
URINALYSIS REFLEX: ABNORMAL
UROBILINOGEN UR STRIP-MCNC: 0.2 MG/DL (ref 0.2–1)
WBC #/AREA URNS HPF: >30 /HPF (ref 0–5)

## 2025-08-04 RX ORDER — CEFUROXIME AXETIL 250 MG/1
250 TABLET ORAL 2 TIMES DAILY
Qty: 14 TABLET | Refills: 0 | Status: SHIPPED | OUTPATIENT
Start: 2025-08-04 | End: 2025-08-08 | Stop reason: SDUPTHER

## 2025-08-08 RX ORDER — CEFUROXIME AXETIL 250 MG/1
250 TABLET ORAL 2 TIMES DAILY
Qty: 14 TABLET | Refills: 0 | Status: SHIPPED | OUTPATIENT
Start: 2025-08-08 | End: 2025-08-15

## 2025-08-11 ENCOUNTER — TELEPHONE (OUTPATIENT)
Dept: INTERNAL MEDICINE | Facility: CLINIC | Age: 89
End: 2025-08-11
Payer: MEDICARE

## 2025-08-11 RX ORDER — AMLODIPINE BESYLATE 5 MG/1
5 TABLET ORAL EVERY 12 HOURS SCHEDULED
Qty: 180 TABLET | Refills: 3 | Status: SHIPPED | OUTPATIENT
Start: 2025-08-11

## 2025-08-18 ENCOUNTER — LAB (OUTPATIENT)
Dept: INTERNAL MEDICINE | Facility: CLINIC | Age: 89
End: 2025-08-18
Payer: MEDICARE

## 2025-08-18 DIAGNOSIS — R30.9 PAINFUL URINATION: Primary | ICD-10-CM

## 2025-08-19 LAB
APPEARANCE UR: CLEAR
BACTERIA #/AREA URNS HPF: NORMAL /[HPF]
BILIRUB UR QL STRIP: NEGATIVE
CASTS URNS QL MICRO: NORMAL /LPF
COLOR UR: YELLOW
EPI CELLS #/AREA URNS HPF: NORMAL /HPF (ref 0–10)
GLUCOSE UR QL STRIP: NEGATIVE
HGB UR QL STRIP: NEGATIVE
KETONES UR QL STRIP: NEGATIVE
LEUKOCYTE ESTERASE UR QL STRIP: NEGATIVE
MICRO URNS: ABNORMAL
NITRITE UR QL STRIP: NEGATIVE
PH UR STRIP: 5.5 [PH] (ref 5–7.5)
PROT UR QL STRIP: ABNORMAL
RBC #/AREA URNS HPF: NORMAL /HPF (ref 0–2)
SP GR UR STRIP: 1.02 (ref 1–1.03)
URINALYSIS REFLEX: ABNORMAL
UROBILINOGEN UR STRIP-MCNC: 0.2 MG/DL (ref 0.2–1)
WBC #/AREA URNS HPF: NORMAL /HPF (ref 0–5)

## 2025-08-28 ENCOUNTER — OFFICE VISIT (OUTPATIENT)
Dept: INTERNAL MEDICINE | Facility: CLINIC | Age: 89
End: 2025-08-28
Payer: MEDICARE

## 2025-08-28 VITALS
HEART RATE: 74 BPM | WEIGHT: 127.2 LBS | BODY MASS INDEX: 23.41 KG/M2 | OXYGEN SATURATION: 97 % | SYSTOLIC BLOOD PRESSURE: 112 MMHG | DIASTOLIC BLOOD PRESSURE: 68 MMHG | HEIGHT: 62 IN

## 2025-08-28 DIAGNOSIS — K58.0 IRRITABLE BOWEL SYNDROME WITH DIARRHEA: ICD-10-CM

## 2025-08-28 DIAGNOSIS — R19.7 DIARRHEA, UNSPECIFIED TYPE: Primary | ICD-10-CM

## 2025-08-28 DIAGNOSIS — S81.819A SKIN TEAR OF LOWER LEG WITHOUT COMPLICATION, UNSPECIFIED LATERALITY, INITIAL ENCOUNTER: ICD-10-CM

## 2025-08-29 LAB
ALBUMIN SERPL-MCNC: 4 G/DL (ref 3.5–5.2)
ALBUMIN/GLOB SERPL: 1.7 G/DL
ALP SERPL-CCNC: 69 U/L (ref 39–117)
ALT SERPL-CCNC: 15 U/L (ref 1–33)
AST SERPL-CCNC: 23 U/L (ref 1–32)
BASOPHILS # BLD AUTO: 0.04 10*3/MM3 (ref 0–0.2)
BASOPHILS NFR BLD AUTO: 0.7 % (ref 0–1.5)
BILIRUB SERPL-MCNC: 0.7 MG/DL (ref 0–1.2)
BUN SERPL-MCNC: 24 MG/DL (ref 8–23)
BUN/CREAT SERPL: 21.6 (ref 7–25)
CALCIUM SERPL-MCNC: 9.7 MG/DL (ref 8.6–10.5)
CHLORIDE SERPL-SCNC: 105 MMOL/L (ref 98–107)
CO2 SERPL-SCNC: 23.7 MMOL/L (ref 22–29)
CREAT SERPL-MCNC: 1.11 MG/DL (ref 0.57–1)
EGFRCR SERPLBLD CKD-EPI 2021: 47.6 ML/MIN/1.73
EOSINOPHIL # BLD AUTO: 0.25 10*3/MM3 (ref 0–0.4)
EOSINOPHIL NFR BLD AUTO: 4.3 % (ref 0.3–6.2)
ERYTHROCYTE [DISTWIDTH] IN BLOOD BY AUTOMATED COUNT: 12 % (ref 12.3–15.4)
GLOBULIN SER CALC-MCNC: 2.4 GM/DL
GLUCOSE SERPL-MCNC: 111 MG/DL (ref 65–99)
HCT VFR BLD AUTO: 36.2 % (ref 34–46.6)
HGB BLD-MCNC: 11.5 G/DL (ref 12–15.9)
IMM GRANULOCYTES # BLD AUTO: 0.01 10*3/MM3 (ref 0–0.05)
IMM GRANULOCYTES NFR BLD AUTO: 0.2 % (ref 0–0.5)
LYMPHOCYTES # BLD AUTO: 1.52 10*3/MM3 (ref 0.7–3.1)
LYMPHOCYTES NFR BLD AUTO: 26.1 % (ref 19.6–45.3)
MCH RBC QN AUTO: 31.6 PG (ref 26.6–33)
MCHC RBC AUTO-ENTMCNC: 31.8 G/DL (ref 31.5–35.7)
MCV RBC AUTO: 99.5 FL (ref 79–97)
MONOCYTES # BLD AUTO: 0.61 10*3/MM3 (ref 0.1–0.9)
MONOCYTES NFR BLD AUTO: 10.5 % (ref 5–12)
NEUTROPHILS # BLD AUTO: 3.4 10*3/MM3 (ref 1.7–7)
NEUTROPHILS NFR BLD AUTO: 58.2 % (ref 42.7–76)
NRBC BLD AUTO-RTO: 0 /100 WBC (ref 0–0.2)
PLATELET # BLD AUTO: 264 10*3/MM3 (ref 140–450)
POTASSIUM SERPL-SCNC: 4.2 MMOL/L (ref 3.5–5.2)
PROT SERPL-MCNC: 6.4 G/DL (ref 6–8.5)
RBC # BLD AUTO: 3.64 10*6/MM3 (ref 3.77–5.28)
SODIUM SERPL-SCNC: 141 MMOL/L (ref 136–145)
WBC # BLD AUTO: 5.83 10*3/MM3 (ref 3.4–10.8)

## 2025-08-30 LAB
CONV .: NORMAL
FERRITIN SERPL-MCNC: 109 NG/ML (ref 13–150)
FOLATE SERPL-MCNC: 10.2 NG/ML (ref 4.78–24.2)
IRON SATN MFR SERPL: 15 % (ref 20–50)
IRON SERPL-MCNC: 51 MCG/DL (ref 37–145)
Lab: NORMAL
TIBC SERPL-MCNC: 332 MCG/DL
UIBC SERPL-MCNC: 281 MCG/DL (ref 112–346)
VIT B12 SERPL-MCNC: 382 PG/ML (ref 211–946)
WRITTEN AUTHORIZATION: NORMAL